# Patient Record
Sex: FEMALE | Race: WHITE | URBAN - METROPOLITAN AREA
[De-identification: names, ages, dates, MRNs, and addresses within clinical notes are randomized per-mention and may not be internally consistent; named-entity substitution may affect disease eponyms.]

---

## 2018-06-22 ENCOUNTER — INPATIENT (INPATIENT)
Facility: HOSPITAL | Age: 78
LOS: 1 days | Discharge: HOME | End: 2018-06-24
Attending: FAMILY MEDICINE | Admitting: FAMILY MEDICINE
Payer: MEDICARE

## 2018-06-22 VITALS
WEIGHT: 199.96 LBS | HEIGHT: 63 IN | DIASTOLIC BLOOD PRESSURE: 92 MMHG | RESPIRATION RATE: 16 BRPM | SYSTOLIC BLOOD PRESSURE: 185 MMHG | OXYGEN SATURATION: 97 % | TEMPERATURE: 98 F | HEART RATE: 100 BPM

## 2018-06-22 DIAGNOSIS — Z90.49 ACQUIRED ABSENCE OF OTHER SPECIFIED PARTS OF DIGESTIVE TRACT: Chronic | ICD-10-CM

## 2018-06-22 LAB
ALBUMIN SERPL ELPH-MCNC: 3.8 G/DL — SIGNIFICANT CHANGE UP (ref 3.3–5)
ALP SERPL-CCNC: 96 U/L — SIGNIFICANT CHANGE UP (ref 40–120)
ALT FLD-CCNC: 19 U/L — SIGNIFICANT CHANGE UP (ref 12–78)
ANION GAP SERPL CALC-SCNC: 8 MMOL/L — SIGNIFICANT CHANGE UP (ref 5–17)
APPEARANCE UR: CLEAR — SIGNIFICANT CHANGE UP
AST SERPL-CCNC: 14 U/L — LOW (ref 15–37)
BASOPHILS # BLD AUTO: 0.01 K/UL — SIGNIFICANT CHANGE UP (ref 0–0.2)
BASOPHILS NFR BLD AUTO: 0.1 % — SIGNIFICANT CHANGE UP (ref 0–2)
BILIRUB SERPL-MCNC: 0.6 MG/DL — SIGNIFICANT CHANGE UP (ref 0.2–1.2)
BILIRUB UR-MCNC: NEGATIVE — SIGNIFICANT CHANGE UP
BUN SERPL-MCNC: 21 MG/DL — SIGNIFICANT CHANGE UP (ref 7–23)
CALCIUM SERPL-MCNC: 9.3 MG/DL — SIGNIFICANT CHANGE UP (ref 8.5–10.1)
CHLORIDE SERPL-SCNC: 103 MMOL/L — SIGNIFICANT CHANGE UP (ref 96–108)
CO2 SERPL-SCNC: 30 MMOL/L — SIGNIFICANT CHANGE UP (ref 22–31)
COLOR SPEC: YELLOW — SIGNIFICANT CHANGE UP
CREAT SERPL-MCNC: 1.15 MG/DL — SIGNIFICANT CHANGE UP (ref 0.5–1.3)
DIFF PNL FLD: ABNORMAL
EOSINOPHIL # BLD AUTO: 0 K/UL — SIGNIFICANT CHANGE UP (ref 0–0.5)
EOSINOPHIL NFR BLD AUTO: 0 % — SIGNIFICANT CHANGE UP (ref 0–6)
GLUCOSE SERPL-MCNC: 205 MG/DL — HIGH (ref 70–99)
GLUCOSE UR QL: 50 MG/DL
HCT VFR BLD CALC: 38.1 % — SIGNIFICANT CHANGE UP (ref 34.5–45)
HGB BLD-MCNC: 13.2 G/DL — SIGNIFICANT CHANGE UP (ref 11.5–15.5)
IMM GRANULOCYTES NFR BLD AUTO: 1 % — SIGNIFICANT CHANGE UP (ref 0–1.5)
KETONES UR-MCNC: ABNORMAL
LEUKOCYTE ESTERASE UR-ACNC: ABNORMAL
LIDOCAIN IGE QN: 99 U/L — SIGNIFICANT CHANGE UP (ref 73–393)
LYMPHOCYTES # BLD AUTO: 0.89 K/UL — LOW (ref 1–3.3)
LYMPHOCYTES # BLD AUTO: 8.2 % — LOW (ref 13–44)
MCHC RBC-ENTMCNC: 29.9 PG — SIGNIFICANT CHANGE UP (ref 27–34)
MCHC RBC-ENTMCNC: 34.6 GM/DL — SIGNIFICANT CHANGE UP (ref 32–36)
MCV RBC AUTO: 86.4 FL — SIGNIFICANT CHANGE UP (ref 80–100)
MONOCYTES # BLD AUTO: 0.52 K/UL — SIGNIFICANT CHANGE UP (ref 0–0.9)
MONOCYTES NFR BLD AUTO: 4.8 % — SIGNIFICANT CHANGE UP (ref 2–14)
NEUTROPHILS # BLD AUTO: 9.3 K/UL — HIGH (ref 1.8–7.4)
NEUTROPHILS NFR BLD AUTO: 85.9 % — HIGH (ref 43–77)
NITRITE UR-MCNC: NEGATIVE — SIGNIFICANT CHANGE UP
PH UR: 5 — SIGNIFICANT CHANGE UP (ref 5–8)
PLATELET # BLD AUTO: 201 K/UL — SIGNIFICANT CHANGE UP (ref 150–400)
POTASSIUM SERPL-MCNC: 3.9 MMOL/L — SIGNIFICANT CHANGE UP (ref 3.5–5.3)
POTASSIUM SERPL-SCNC: 3.9 MMOL/L — SIGNIFICANT CHANGE UP (ref 3.5–5.3)
PROT SERPL-MCNC: 7.6 GM/DL — SIGNIFICANT CHANGE UP (ref 6–8.3)
PROT UR-MCNC: 30 MG/DL
RBC # BLD: 4.41 M/UL — SIGNIFICANT CHANGE UP (ref 3.8–5.2)
RBC # FLD: 12.2 % — SIGNIFICANT CHANGE UP (ref 10.3–14.5)
SODIUM SERPL-SCNC: 141 MMOL/L — SIGNIFICANT CHANGE UP (ref 135–145)
SP GR SPEC: 1.01 — SIGNIFICANT CHANGE UP (ref 1.01–1.02)
UROBILINOGEN FLD QL: NEGATIVE MG/DL — SIGNIFICANT CHANGE UP
WBC # BLD: 10.83 K/UL — HIGH (ref 3.8–10.5)
WBC # FLD AUTO: 10.83 K/UL — HIGH (ref 3.8–10.5)

## 2018-06-22 PROCEDURE — 93010 ELECTROCARDIOGRAM REPORT: CPT

## 2018-06-22 PROCEDURE — 74177 CT ABD & PELVIS W/CONTRAST: CPT | Mod: 26

## 2018-06-22 PROCEDURE — 99285 EMERGENCY DEPT VISIT HI MDM: CPT

## 2018-06-22 RX ORDER — TAMSULOSIN HYDROCHLORIDE 0.4 MG/1
0.4 CAPSULE ORAL ONCE
Qty: 0 | Refills: 0 | Status: COMPLETED | OUTPATIENT
Start: 2018-06-22 | End: 2018-06-22

## 2018-06-22 RX ORDER — ONDANSETRON 8 MG/1
4 TABLET, FILM COATED ORAL ONCE
Qty: 0 | Refills: 0 | Status: COMPLETED | OUTPATIENT
Start: 2018-06-22 | End: 2018-06-22

## 2018-06-22 RX ORDER — SODIUM CHLORIDE 9 MG/ML
1000 INJECTION INTRAMUSCULAR; INTRAVENOUS; SUBCUTANEOUS ONCE
Qty: 0 | Refills: 0 | Status: COMPLETED | OUTPATIENT
Start: 2018-06-22 | End: 2018-06-22

## 2018-06-22 RX ORDER — SODIUM CHLORIDE 9 MG/ML
500 INJECTION INTRAMUSCULAR; INTRAVENOUS; SUBCUTANEOUS ONCE
Qty: 0 | Refills: 0 | Status: COMPLETED | OUTPATIENT
Start: 2018-06-22 | End: 2018-06-22

## 2018-06-22 RX ORDER — MORPHINE SULFATE 50 MG/1
4 CAPSULE, EXTENDED RELEASE ORAL ONCE
Qty: 0 | Refills: 0 | Status: DISCONTINUED | OUTPATIENT
Start: 2018-06-22 | End: 2018-06-22

## 2018-06-22 RX ADMIN — TAMSULOSIN HYDROCHLORIDE 0.4 MILLIGRAM(S): 0.4 CAPSULE ORAL at 22:08

## 2018-06-22 RX ADMIN — MORPHINE SULFATE 4 MILLIGRAM(S): 50 CAPSULE, EXTENDED RELEASE ORAL at 17:53

## 2018-06-22 RX ADMIN — MORPHINE SULFATE 4 MILLIGRAM(S): 50 CAPSULE, EXTENDED RELEASE ORAL at 22:17

## 2018-06-22 RX ADMIN — ONDANSETRON 4 MILLIGRAM(S): 8 TABLET, FILM COATED ORAL at 17:54

## 2018-06-22 RX ADMIN — SODIUM CHLORIDE 500 MILLILITER(S): 9 INJECTION INTRAMUSCULAR; INTRAVENOUS; SUBCUTANEOUS at 19:23

## 2018-06-22 RX ADMIN — SODIUM CHLORIDE 1000 MILLILITER(S): 9 INJECTION INTRAMUSCULAR; INTRAVENOUS; SUBCUTANEOUS at 17:56

## 2018-06-22 NOTE — ED ADULT NURSE NOTE - CHPI ED SYMPTOMS NEG
no fever/no abdominal distension/no hematuria/no diarrhea/no burning urination/no blood in stool/no dysuria/no chills

## 2018-06-22 NOTE — ED ADULT NURSE REASSESSMENT NOTE - NS ED NURSE REASSESS COMMENT FT1
pt comfortable no complaints at this time pt comfortable no complaints at this time, family at bedside

## 2018-06-22 NOTE — ED PROVIDER NOTE - OBJECTIVE STATEMENT
79 y/o female with a PMHx of HTN, DM, hypothyroidism, s/p cholecystectomy (2011) presents to the ED c/o abd pain. +x1 episode vomiting. +constipation. Pt notes she started feeling the pain around 3 AM and then had an episode of vomiting at 5 AM. Notes the pain is less in ED. Pt notes she has diverticula. Denies SOB, CP, diarrhea, dysuria, hematuria. Denies Hx of kidney stones. Last BM x1 day ago.  Sugar reading 255 at 1:30 PM today.

## 2018-06-22 NOTE — ED ADULT NURSE NOTE - OBJECTIVE STATEMENT
Pt states abd pain started this morning but has not subsided so she came here . Pt states pain is on lower left side denies radiation. Pt stated she vomited once this AM

## 2018-06-22 NOTE — ED PROVIDER NOTE - MEDICAL DECISION MAKING DETAILS
77 y/o female presents to the ED c/o LLQ pain and left flank pain. diverticulitis vs. colitis vs. kidney stones.

## 2018-06-23 DIAGNOSIS — N23 UNSPECIFIED RENAL COLIC: ICD-10-CM

## 2018-06-23 DIAGNOSIS — E11.9 TYPE 2 DIABETES MELLITUS WITHOUT COMPLICATIONS: ICD-10-CM

## 2018-06-23 DIAGNOSIS — Z29.9 ENCOUNTER FOR PROPHYLACTIC MEASURES, UNSPECIFIED: ICD-10-CM

## 2018-06-23 DIAGNOSIS — N13.30 UNSPECIFIED HYDRONEPHROSIS: ICD-10-CM

## 2018-06-23 DIAGNOSIS — I10 ESSENTIAL (PRIMARY) HYPERTENSION: ICD-10-CM

## 2018-06-23 DIAGNOSIS — N20.0 CALCULUS OF KIDNEY: ICD-10-CM

## 2018-06-23 DIAGNOSIS — E03.9 HYPOTHYROIDISM, UNSPECIFIED: ICD-10-CM

## 2018-06-23 LAB
ANION GAP SERPL CALC-SCNC: 7 MMOL/L — SIGNIFICANT CHANGE UP (ref 5–17)
BASOPHILS # BLD AUTO: 0.01 K/UL — SIGNIFICANT CHANGE UP (ref 0–0.2)
BASOPHILS NFR BLD AUTO: 0.1 % — SIGNIFICANT CHANGE UP (ref 0–2)
BUN SERPL-MCNC: 19 MG/DL — SIGNIFICANT CHANGE UP (ref 7–23)
CALCIUM SERPL-MCNC: 8.2 MG/DL — LOW (ref 8.5–10.1)
CHLORIDE SERPL-SCNC: 105 MMOL/L — SIGNIFICANT CHANGE UP (ref 96–108)
CO2 SERPL-SCNC: 27 MMOL/L — SIGNIFICANT CHANGE UP (ref 22–31)
CREAT SERPL-MCNC: 1.04 MG/DL — SIGNIFICANT CHANGE UP (ref 0.5–1.3)
EOSINOPHIL # BLD AUTO: 0.02 K/UL — SIGNIFICANT CHANGE UP (ref 0–0.5)
EOSINOPHIL NFR BLD AUTO: 0.2 % — SIGNIFICANT CHANGE UP (ref 0–6)
GLUCOSE BLDC GLUCOMTR-MCNC: 147 MG/DL — HIGH (ref 70–99)
GLUCOSE BLDC GLUCOMTR-MCNC: 149 MG/DL — HIGH (ref 70–99)
GLUCOSE BLDC GLUCOMTR-MCNC: 168 MG/DL — HIGH (ref 70–99)
GLUCOSE BLDC GLUCOMTR-MCNC: 189 MG/DL — HIGH (ref 70–99)
GLUCOSE SERPL-MCNC: 149 MG/DL — HIGH (ref 70–99)
HBA1C BLD-MCNC: 6.7 % — HIGH (ref 4–5.6)
HCT VFR BLD CALC: 32.8 % — LOW (ref 34.5–45)
HGB BLD-MCNC: 10.8 G/DL — LOW (ref 11.5–15.5)
IMM GRANULOCYTES NFR BLD AUTO: 0.4 % — SIGNIFICANT CHANGE UP (ref 0–1.5)
LYMPHOCYTES # BLD AUTO: 1.19 K/UL — SIGNIFICANT CHANGE UP (ref 1–3.3)
LYMPHOCYTES # BLD AUTO: 13 % — SIGNIFICANT CHANGE UP (ref 13–44)
MCHC RBC-ENTMCNC: 29.3 PG — SIGNIFICANT CHANGE UP (ref 27–34)
MCHC RBC-ENTMCNC: 32.9 GM/DL — SIGNIFICANT CHANGE UP (ref 32–36)
MCV RBC AUTO: 88.9 FL — SIGNIFICANT CHANGE UP (ref 80–100)
MONOCYTES # BLD AUTO: 0.89 K/UL — SIGNIFICANT CHANGE UP (ref 0–0.9)
MONOCYTES NFR BLD AUTO: 9.7 % — SIGNIFICANT CHANGE UP (ref 2–14)
NEUTROPHILS # BLD AUTO: 7 K/UL — SIGNIFICANT CHANGE UP (ref 1.8–7.4)
NEUTROPHILS NFR BLD AUTO: 76.6 % — SIGNIFICANT CHANGE UP (ref 43–77)
NRBC # BLD: 0 /100 WBCS — SIGNIFICANT CHANGE UP (ref 0–0)
PLATELET # BLD AUTO: 167 K/UL — SIGNIFICANT CHANGE UP (ref 150–400)
POTASSIUM SERPL-MCNC: 3.6 MMOL/L — SIGNIFICANT CHANGE UP (ref 3.5–5.3)
POTASSIUM SERPL-SCNC: 3.6 MMOL/L — SIGNIFICANT CHANGE UP (ref 3.5–5.3)
RBC # BLD: 3.69 M/UL — LOW (ref 3.8–5.2)
RBC # FLD: 12.4 % — SIGNIFICANT CHANGE UP (ref 10.3–14.5)
SODIUM SERPL-SCNC: 139 MMOL/L — SIGNIFICANT CHANGE UP (ref 135–145)
WBC # BLD: 9.15 K/UL — SIGNIFICANT CHANGE UP (ref 3.8–10.5)
WBC # FLD AUTO: 9.15 K/UL — SIGNIFICANT CHANGE UP (ref 3.8–10.5)

## 2018-06-23 PROCEDURE — 52332 CYSTOSCOPY AND TREATMENT: CPT | Mod: LT

## 2018-06-23 PROCEDURE — 99223 1ST HOSP IP/OBS HIGH 75: CPT | Mod: 25

## 2018-06-23 RX ORDER — TAMSULOSIN HYDROCHLORIDE 0.4 MG/1
0.4 CAPSULE ORAL AT BEDTIME
Qty: 0 | Refills: 0 | Status: DISCONTINUED | OUTPATIENT
Start: 2018-06-23 | End: 2018-06-24

## 2018-06-23 RX ORDER — HEPARIN SODIUM 5000 [USP'U]/ML
5000 INJECTION INTRAVENOUS; SUBCUTANEOUS EVERY 12 HOURS
Qty: 0 | Refills: 0 | Status: DISCONTINUED | OUTPATIENT
Start: 2018-06-23 | End: 2018-06-24

## 2018-06-23 RX ORDER — OXYBUTYNIN CHLORIDE 5 MG
5 TABLET ORAL THREE TIMES A DAY
Qty: 0 | Refills: 0 | Status: DISCONTINUED | OUTPATIENT
Start: 2018-06-23 | End: 2018-06-24

## 2018-06-23 RX ORDER — PHENAZOPYRIDINE HCL 100 MG
100 TABLET ORAL THREE TIMES A DAY
Qty: 0 | Refills: 0 | Status: DISCONTINUED | OUTPATIENT
Start: 2018-06-23 | End: 2018-06-24

## 2018-06-23 RX ORDER — FENTANYL CITRATE 50 UG/ML
50 INJECTION INTRAVENOUS
Qty: 0 | Refills: 0 | Status: DISCONTINUED | OUTPATIENT
Start: 2018-06-23 | End: 2018-06-23

## 2018-06-23 RX ORDER — OXYCODONE HYDROCHLORIDE 5 MG/1
10 TABLET ORAL EVERY 6 HOURS
Qty: 0 | Refills: 0 | Status: DISCONTINUED | OUTPATIENT
Start: 2018-06-23 | End: 2018-06-23

## 2018-06-23 RX ORDER — SODIUM CHLORIDE 9 MG/ML
1000 INJECTION INTRAMUSCULAR; INTRAVENOUS; SUBCUTANEOUS
Qty: 0 | Refills: 0 | Status: COMPLETED | OUTPATIENT
Start: 2018-06-23 | End: 2018-06-23

## 2018-06-23 RX ORDER — ASPIRIN/CALCIUM CARB/MAGNESIUM 324 MG
81 TABLET ORAL DAILY
Qty: 0 | Refills: 0 | Status: DISCONTINUED | OUTPATIENT
Start: 2018-06-23 | End: 2018-06-24

## 2018-06-23 RX ORDER — SODIUM CHLORIDE 9 MG/ML
1000 INJECTION INTRAMUSCULAR; INTRAVENOUS; SUBCUTANEOUS
Qty: 0 | Refills: 0 | Status: DISCONTINUED | OUTPATIENT
Start: 2018-06-23 | End: 2018-06-24

## 2018-06-23 RX ORDER — DEXTROSE 50 % IN WATER 50 %
25 SYRINGE (ML) INTRAVENOUS ONCE
Qty: 0 | Refills: 0 | Status: DISCONTINUED | OUTPATIENT
Start: 2018-06-23 | End: 2018-06-24

## 2018-06-23 RX ORDER — INSULIN LISPRO 100/ML
VIAL (ML) SUBCUTANEOUS
Qty: 0 | Refills: 0 | Status: DISCONTINUED | OUTPATIENT
Start: 2018-06-23 | End: 2018-06-24

## 2018-06-23 RX ORDER — MORPHINE SULFATE 50 MG/1
2 CAPSULE, EXTENDED RELEASE ORAL ONCE
Qty: 0 | Refills: 0 | Status: DISCONTINUED | OUTPATIENT
Start: 2018-06-23 | End: 2018-06-23

## 2018-06-23 RX ORDER — LEVOTHYROXINE SODIUM 125 MCG
88 TABLET ORAL DAILY
Qty: 0 | Refills: 0 | Status: DISCONTINUED | OUTPATIENT
Start: 2018-06-23 | End: 2018-06-24

## 2018-06-23 RX ORDER — SODIUM CHLORIDE 9 MG/ML
1000 INJECTION, SOLUTION INTRAVENOUS
Qty: 0 | Refills: 0 | Status: DISCONTINUED | OUTPATIENT
Start: 2018-06-23 | End: 2018-06-24

## 2018-06-23 RX ORDER — MORPHINE SULFATE 50 MG/1
4 CAPSULE, EXTENDED RELEASE ORAL EVERY 4 HOURS
Qty: 0 | Refills: 0 | Status: DISCONTINUED | OUTPATIENT
Start: 2018-06-23 | End: 2018-06-24

## 2018-06-23 RX ORDER — ACETAMINOPHEN 500 MG
650 TABLET ORAL EVERY 6 HOURS
Qty: 0 | Refills: 0 | Status: DISCONTINUED | OUTPATIENT
Start: 2018-06-23 | End: 2018-06-24

## 2018-06-23 RX ORDER — ATORVASTATIN CALCIUM 80 MG/1
20 TABLET, FILM COATED ORAL AT BEDTIME
Qty: 0 | Refills: 0 | Status: DISCONTINUED | OUTPATIENT
Start: 2018-06-23 | End: 2018-06-24

## 2018-06-23 RX ORDER — DEXTROSE 50 % IN WATER 50 %
12.5 SYRINGE (ML) INTRAVENOUS ONCE
Qty: 0 | Refills: 0 | Status: DISCONTINUED | OUTPATIENT
Start: 2018-06-23 | End: 2018-06-24

## 2018-06-23 RX ORDER — DEXTROSE 50 % IN WATER 50 %
15 SYRINGE (ML) INTRAVENOUS ONCE
Qty: 0 | Refills: 0 | Status: DISCONTINUED | OUTPATIENT
Start: 2018-06-23 | End: 2018-06-24

## 2018-06-23 RX ORDER — SODIUM CHLORIDE 9 MG/ML
1000 INJECTION INTRAMUSCULAR; INTRAVENOUS; SUBCUTANEOUS
Qty: 0 | Refills: 0 | Status: DISCONTINUED | OUTPATIENT
Start: 2018-06-23 | End: 2018-06-23

## 2018-06-23 RX ORDER — SENNA PLUS 8.6 MG/1
2 TABLET ORAL AT BEDTIME
Qty: 0 | Refills: 0 | Status: DISCONTINUED | OUTPATIENT
Start: 2018-06-23 | End: 2018-06-24

## 2018-06-23 RX ORDER — MORPHINE SULFATE 50 MG/1
2 CAPSULE, EXTENDED RELEASE ORAL EVERY 4 HOURS
Qty: 0 | Refills: 0 | Status: DISCONTINUED | OUTPATIENT
Start: 2018-06-23 | End: 2018-06-24

## 2018-06-23 RX ORDER — ONDANSETRON 8 MG/1
4 TABLET, FILM COATED ORAL ONCE
Qty: 0 | Refills: 0 | Status: DISCONTINUED | OUTPATIENT
Start: 2018-06-23 | End: 2018-06-23

## 2018-06-23 RX ORDER — GLUCAGON INJECTION, SOLUTION 0.5 MG/.1ML
1 INJECTION, SOLUTION SUBCUTANEOUS ONCE
Qty: 0 | Refills: 0 | Status: DISCONTINUED | OUTPATIENT
Start: 2018-06-23 | End: 2018-06-24

## 2018-06-23 RX ADMIN — ATORVASTATIN CALCIUM 20 MILLIGRAM(S): 80 TABLET, FILM COATED ORAL at 22:37

## 2018-06-23 RX ADMIN — Medication 88 MICROGRAM(S): at 05:21

## 2018-06-23 RX ADMIN — TAMSULOSIN HYDROCHLORIDE 0.4 MILLIGRAM(S): 0.4 CAPSULE ORAL at 22:37

## 2018-06-23 RX ADMIN — SODIUM CHLORIDE 75 MILLILITER(S): 9 INJECTION INTRAMUSCULAR; INTRAVENOUS; SUBCUTANEOUS at 22:36

## 2018-06-23 RX ADMIN — HEPARIN SODIUM 5000 UNIT(S): 5000 INJECTION INTRAVENOUS; SUBCUTANEOUS at 17:05

## 2018-06-23 RX ADMIN — HEPARIN SODIUM 5000 UNIT(S): 5000 INJECTION INTRAVENOUS; SUBCUTANEOUS at 05:21

## 2018-06-23 RX ADMIN — Medication 2: at 07:57

## 2018-06-23 RX ADMIN — Medication 100 MILLIGRAM(S): at 23:58

## 2018-06-23 RX ADMIN — SODIUM CHLORIDE 100 MILLILITER(S): 9 INJECTION INTRAMUSCULAR; INTRAVENOUS; SUBCUTANEOUS at 23:59

## 2018-06-23 RX ADMIN — OXYCODONE HYDROCHLORIDE 10 MILLIGRAM(S): 5 TABLET ORAL at 22:44

## 2018-06-23 RX ADMIN — SODIUM CHLORIDE 100 MILLILITER(S): 9 INJECTION INTRAMUSCULAR; INTRAVENOUS; SUBCUTANEOUS at 15:07

## 2018-06-23 RX ADMIN — SODIUM CHLORIDE 100 MILLILITER(S): 9 INJECTION INTRAMUSCULAR; INTRAVENOUS; SUBCUTANEOUS at 05:22

## 2018-06-23 RX ADMIN — MORPHINE SULFATE 2 MILLIGRAM(S): 50 CAPSULE, EXTENDED RELEASE ORAL at 02:43

## 2018-06-23 RX ADMIN — MORPHINE SULFATE 4 MILLIGRAM(S): 50 CAPSULE, EXTENDED RELEASE ORAL at 14:59

## 2018-06-23 RX ADMIN — MORPHINE SULFATE 2 MILLIGRAM(S): 50 CAPSULE, EXTENDED RELEASE ORAL at 08:45

## 2018-06-23 RX ADMIN — Medication 81 MILLIGRAM(S): at 11:10

## 2018-06-23 NOTE — CONSULT NOTE ADULT - SUBJECTIVE AND OBJECTIVE BOX
CHIEF COMPLAINT:  Left 9 mm obstructing renal pelvic stone    HISTORY OF PRESENT ILLNESS:  79 yo female admitted to medicine service with left sided groin pain with associated nausea and vomiting.   On CT-  Left 9 mm obstructing renal pelvic stone. Still requiring IV pain medication every few hours.   No prior h/o kidney stone. Denies dysuria, hematuria, fever, chills or rigors.   Saw Dr Pack once for Renal cyst.       PAST MEDICAL & SURGICAL HISTORY:  Chronic back pain  Hypothyroidism  DM (diabetes mellitus)  Hypertension  S/P cholecystectomy      REVIEW OF SYSTEMS:    CONSTITUTIONAL: No weakness, fevers or chills  EYES/ENT: No visual changes;  No vertigo or throat pain   NECK: No pain or stiffness  RESPIRATORY: No cough, wheezing, hemoptysis, No shortness of breath  CARDIOVASCULAR: No chest pain or palpitations  GASTROINTESTINAL: See HPI  GENITOURINARY: See HPI  NEUROLOGICAL: No numbness or weakness  SKIN: No rashes or lesions   All other review of systems is negative unless indicated above.    MEDICATIONS  (STANDING):  aspirin enteric coated 81 milliGRAM(s) Oral daily  atorvastatin 20 milliGRAM(s) Oral at bedtime  dextrose 5%. 1000 milliLiter(s) (50 mL/Hr) IV Continuous <Continuous>  dextrose 50% Injectable 12.5 Gram(s) IV Push once  dextrose 50% Injectable 25 Gram(s) IV Push once  dextrose 50% Injectable 25 Gram(s) IV Push once  heparin  Injectable 5000 Unit(s) SubCutaneous every 12 hours  insulin lispro (HumaLOG) corrective regimen sliding scale   SubCutaneous three times a day before meals  levoFLOXacin IVPB 750 milliGRAM(s) IV Intermittent every 24 hours  levothyroxine 88 MICROGram(s) Oral daily  sodium chloride 0.9%. 1000 milliLiter(s) (100 mL/Hr) IV Continuous <Continuous>  sodium chloride 0.9%. 1000 milliLiter(s) (100 mL/Hr) IV Continuous <Continuous>  tamsulosin 0.4 milliGRAM(s) Oral at bedtime    MEDICATIONS  (PRN):  acetaminophen   Tablet. 650 milliGRAM(s) Oral every 6 hours PRN Mild Pain (1 - 3)  dextrose 40% Gel 15 Gram(s) Oral once PRN Blood Glucose LESS THAN 70 milliGRAM(s)/deciliter  glucagon  Injectable 1 milliGRAM(s) IntraMuscular once PRN Glucose LESS THAN 70 milligrams/deciliter  morphine  - Injectable 2 milliGRAM(s) IV Push every 4 hours PRN Moderate Pain (4 - 6)  morphine  - Injectable 4 milliGRAM(s) IV Push every 4 hours PRN Severe Pain (7 - 10)  senna 2 Tablet(s) Oral at bedtime PRN Constipation      Allergies    Ammoniated Mercury (Unknown)  Keflex (Unknown)  penicillin G potassium (Unknown)  Victoza (Unknown)    Intolerances        SOCIAL HISTORY:    FAMILY HISTORY:  Family history of hypertension (Father, Mother)  Family history of diabetes mellitus (Sibling, Grandparent): Brother and grandmother      Vital Signs Last 24 Hrs  T(C): 36.7 (2018 15:44), Max: 37 (2018 03:56)  T(F): 98 (2018 15:44), Max: 98.6 (2018 03:56)  HR: 74 (2018 15:44) (74 - 87)  BP: 134/49 (2018 15:44) (117/47 - 136/61)  BP(mean): --  RR: 16 (2018 15:44) (16 - 18)  SpO2: 98% (2018 15:44) (93% - 98%)    PHYSICAL EXAM:    Constitutional: No acute distress  HEENT: EOMI, Normal Hearing  Neck: Supple  Back: No costovertebral angle tenderness  Respiratory: Normal respiratory effort    Cardiovascular: Normal peripheral circulation   Abd: Soft, non distended, non tender  Extremities: No peripheral edema  Neurological: No focal deficits  Psychiatric: Normal mood, normal affect  Musculoskeletal: Moving all 4 extremities  Skin: No rashes    LABS:                        10.8   9.15  )-----------( 167      ( 2018 07:31 )             32.8     06-23    139  |  105  |  19  ----------------------------<  149<H>  3.6   |  27  |  1.04    Ca    8.2<L>      2018 07:31    TPro  7.6  /  Alb  3.8  /  TBili  0.6  /  DBili  x   /  AST  14<L>  /  ALT  19  /  AlkPhos  96  06-22      Urinalysis Basic - ( 2018 21:54 )    Color: Yellow / Appearance: Clear / S.015 / pH: x  Gluc: x / Ketone: Trace  / Bili: Negative / Urobili: Negative mg/dL   Blood: x / Protein: 30 mg/dL / Nitrite: Negative   Leuk Esterase: Trace / RBC: 6-10 /HPF / WBC 3-5   Sq Epi: x / Non Sq Epi: Moderate / Bacteria: Few    < from: CT Abdomen and Pelvis w/ Oral Cont and w/ IV Cont (18 @ 21:14) >  CT ABDOMEN AND PELVIS OC IC                            PROCEDURE DATE:  2018          INTERPRETATION:  CLINICAL INFORMATION: Left lower quadrant pain    COMPARISON: None    PROCEDURE:   CT of the Abdomen and Pelvis was performed with intravenous contrast.   Intravenous contrast: 90 ml Omnipaque 350. 10 ml discarded.  Oral contrast: positive contrast was administered.  Sagittal and coronal reformats were performed.    FINDINGS:    LOWER CHEST: Within normal limits.    LIVER: Within normal limits.  BILE DUCTS: Normal caliber.  GALLBLADDER: Cholecystectomy clips.  SPLEEN: Within normal limits.  PANCREAS: Within normal limits.  ADRENALS: Within normal limits.  KIDNEYS/URETERS: 9 mm calculus in the left renal pelvis with mild   hydronephrosis. Parapelvic left renal cysts. Atrophic left kidney. Right   renal cyst measures 4.3 cm..    BLADDER: Within normal limits.  REPRODUCTIVE ORGANS: The uterus and adnexa are within normal limits.     BOWEL: No bowel obstruction. Appendix normal. Diverticulosis coli.  PERITONEUM: No ascites.  VESSELS: Atherosclerotic calcifications of the aorta.  RETROPERITONEUM: No lymphadenopathy.    ABDOMINAL WALL: Within normal limits.  BONES: Degenerative changes of the spine are present with grade 1   anterolisthesis of L4 on L5.    IMPRESSION: 9 mm calculus in the left renal pelvis with mild   hydronephrosis.    < end of copied text >

## 2018-06-23 NOTE — BRIEF OPERATIVE NOTE - PRE-OP DX
Kidney stone on left side  06/23/2018    Active  Jorge Alvarez  Left flank pain  06/23/2018    Active  Jorge Alvarez

## 2018-06-23 NOTE — BRIEF OPERATIVE NOTE - PROCEDURE
<<-----Click on this checkbox to enter Procedure Cystoscopy with insertion of stent  06/23/2018  Left  Active  THERON

## 2018-06-23 NOTE — H&P ADULT - PROBLEM SELECTOR PLAN 1
Admit to med surg  Vitals q8h  Continue levofloxacin  Tamsulosin 0.4mg daily   Morphine 2/4 mg q4h for pain  IVF NS at 100 mL/hr for 12 hours Admit to med surg  Vitals q8h  Continue levofloxacin  Tamsulosin 0.4mg daily   Morphine 2/4 mg q4h for pain  IVF NS at 100 mL/hr for 12 hours  Urology consult Due to obstructive uropathy and probably UTI  Admit to med surg  Vitals q8h  Continue levofloxacin 750mg q24 (penicillin/cephalosporin allergy)  Tamsulosin 0.4mg daily   Morphine 2/4 mg q4h for pain  IVF NS at 100 mL/hr for 12 hours  CT appreciated: mild hydro and 9mm calculus  Urology consult: Dr. Alvarez

## 2018-06-23 NOTE — CONSULT NOTE ADULT - ASSESSMENT
Discussed treatment options and recommended Cystoscopy, retrograde pyelogram and left ureteral stent placement.   Pt agreeable. Booked for OR.   Risks and benefits were discussed. Informed consent obtained.     Discussed will need definitive management of stones at a later with either ESWL(if stone are radio opaque) or Ureteroscopy, laser lithotripsy, stone extraction and stent exchange.

## 2018-06-23 NOTE — H&P ADULT - HISTORY OF PRESENT ILLNESS
This is an 79 y/o F with PMHx HTN, DM2, hypothyroidism, chronic back pain s/p neurotomy presents with 1 day LLQ/groin pain. Reports that it started at approximately 3 AM on 6/22 and is described as constant, severe, radiating to the back, associated with nausea and one episode of vomiting. Pain well controlled with morphine. Denies any dysuria, fever, chills, CP, SOB or additional symptoms. No previous history of kidney stones. Reports she sees her urologist, Dr. Tracie Zarco, for monitoring of a renal cyst. Patient reports she was unable to take her medications on day of and day prior to admission due to nausea.     Reports rash with penicillins and keflex. Received levofloxacin, tamsulosin and 1500 mL NS bolus in ED.

## 2018-06-23 NOTE — H&P ADULT - NSHPREVIEWOFSYSTEMS_GEN_ALL_CORE
REVIEW OF SYSTEMS:    CONSTITUTIONAL: No weakness, fevers or chills  EYES/ENT: No visual changes;  No vertigo or throat pain   NECK: No pain or stiffness  RESPIRATORY: No cough, wheezing, hemoptysis; No shortness of breath  CARDIOVASCULAR: No chest pain or palpitations  GASTROINTESTINAL: + abdominal, no epigastric pain. + nausea, vomiting. No hematemesis; No diarrhea or constipation. No melena or hematochezia.  GENITOURINARY: No dysuria, frequency  NEUROLOGICAL: No numbness or weakness  SKIN: No itching, burning, rashes, or lesions   All other review of systems is negative unless indicated above.

## 2018-06-23 NOTE — BRIEF OPERATIVE NOTE - COMMENTS
Will need definitive management of stones at a later with either ESWL(if stone are radio opaque) or Ureteroscopy, laser lithotripsy, stone extraction and stent exchange. Will contact Pt.   Will get KUB in the AM.   Can be discharged tomorrow if stable.   Pyridium 100 mg TID x 3 days.   Oxybutynin 5 mg TID PRN Bladder spasms.

## 2018-06-23 NOTE — PROGRESS NOTE ADULT - ATTENDING COMMENTS
Patient seen and examined with Family Medicine Residents Rafael Shah and on the Family Medicine Teaching Service.  Agree with history, physical, labs and plan which were reviewed in detail.

## 2018-06-23 NOTE — H&P ADULT - FAMILY HISTORY
Sibling  Still living? Unknown  Family history of diabetes mellitus, Age at diagnosis: Age Unknown     Grandparent  Still living? Unknown  Family history of diabetes mellitus, Age at diagnosis: Age Unknown     Father  Still living? Unknown  Family history of hypertension, Age at diagnosis: Age Unknown     Mother  Still living? Unknown  Family history of hypertension, Age at diagnosis: Age Unknown

## 2018-06-23 NOTE — H&P ADULT - NSHPPHYSICALEXAM_GEN_ALL_CORE
Vital Signs Last 24 Hrs  T(C): 36.7 (22 Jun 2018 16:23), Max: 36.7 (22 Jun 2018 16:23)  T(F): 98.1 (22 Jun 2018 16:23), Max: 98.1 (22 Jun 2018 16:23)  HR: 85 (22 Jun 2018 22:07) (85 - 100)  BP: 136/61 (22 Jun 2018 22:07) (136/61 - 185/92)  BP(mean): --  RR: 18 (22 Jun 2018 22:07) (16 - 18)  SpO2: 95% (22 Jun 2018 22:07) (95% - 97%)    PHYSICAL EXAM:    GENERAL: NAD, person of size  HEAD:  NC/AT  EYES: EOMI, no scleral icterus  HEENT: Moist mucous membranes  NECK: Supple, No JVD  CNS:  Alert & Oriented X3, non-focal  LUNG: Clear to auscultation bilaterally; No rales, rhonchi, wheezing, or rubs  HEART: RRR; No murmurs, rubs, or gallops  ABDOMEN: +BS, LLQ/left groin mild tenderness to palpation. No CVA tenderness, patient had just received morphine.  GENITOURINARY- Voiding, Bladder not distended  EXTREMITIES:  2+ Peripheral Pulses, No clubbing, cyanosis, or edema  MUSCULOSKELTAL- Joints normal ROM, no Muscle or joint tenderness Vital Signs Last 24 Hrs  T(C): 36.7 (22 Jun 2018 16:23), Max: 36.7 (22 Jun 2018 16:23)  T(F): 98.1 (22 Jun 2018 16:23), Max: 98.1 (22 Jun 2018 16:23)  HR: 85 (22 Jun 2018 22:07) (85 - 100)  BP: 136/61 (22 Jun 2018 22:07) (136/61 - 185/92)  RR: 18 (22 Jun 2018 22:07) (16 - 18)  SpO2: 95% (22 Jun 2018 22:07) (95% - 97%)    PHYSICAL EXAM:    GENERAL: NAD, person of size  HEAD:  NC/AT  EYES: EOMI, no scleral icterus  HEENT: Moist mucous membranes  NECK: Supple, No JVD  CNS:  Alert & Oriented X3, non-focal  LUNG: Clear to auscultation bilaterally; No rales, rhonchi, wheezing, or rubs  HEART: RRR; No murmurs, rubs, or gallops  ABDOMEN: +BS, LLQ/left groin mild tenderness to palpation. No CVA tenderness, patient had just received morphine.  GENITOURINARY- Voiding, Bladder not distended  EXTREMITIES:  2+ Peripheral Pulses, No clubbing, cyanosis, or edema  MUSCULOSKELETAL- Joints normal ROM, no Muscle or joint tenderness

## 2018-06-23 NOTE — H&P ADULT - PROBLEM SELECTOR PLAN 4
Hold losartan  Continue metoprolol tartrate at half dose, 50mg BID Hold losartan and metoprolol for now

## 2018-06-23 NOTE — H&P ADULT - ATTENDING COMMENTS
77 y/o F PMHx as noted above presents to the ED for further evaluation of a 1 day hx of LLQ abdominal and left groin pain found to have an obstructive uropathy.    1)Acute Obstructive Uropathy w/ Left Hydronephrosis  ~admit to Medicine  ~CT ABD/Pel revealed a 9mm obstructing stone w/in the left renal pelvis w/ associated hydronephrosis  ~cont. IV hydration  ~NPO for now  ~f/u w/ Urology in the am  ~cont. Tamsulosin 0.4mg po daily  ~cont. IV abx  ~strict I/Os  ~cont. pain management as above     2)Hypothyroidism  ~cont. Levothyroxine as noted above     3)Diabetes Mellitus type 2   ~FS qAC/HS  ~cont. ISS per protocol     4)HTN  ~will hold losartan and metoprolol for now. Reassess in the am    5)Vte ppx  ~cont. Heparin sq

## 2018-06-24 ENCOUNTER — TRANSCRIPTION ENCOUNTER (OUTPATIENT)
Age: 78
End: 2018-06-24

## 2018-06-24 VITALS — SYSTOLIC BLOOD PRESSURE: 163 MMHG | DIASTOLIC BLOOD PRESSURE: 70 MMHG

## 2018-06-24 LAB
ANION GAP SERPL CALC-SCNC: 6 MMOL/L — SIGNIFICANT CHANGE UP (ref 5–17)
BUN SERPL-MCNC: 17 MG/DL — SIGNIFICANT CHANGE UP (ref 7–23)
CALCIUM SERPL-MCNC: 8.1 MG/DL — LOW (ref 8.5–10.1)
CHLORIDE SERPL-SCNC: 103 MMOL/L — SIGNIFICANT CHANGE UP (ref 96–108)
CO2 SERPL-SCNC: 27 MMOL/L — SIGNIFICANT CHANGE UP (ref 22–31)
CREAT SERPL-MCNC: 1.01 MG/DL — SIGNIFICANT CHANGE UP (ref 0.5–1.3)
GLUCOSE BLDC GLUCOMTR-MCNC: 216 MG/DL — HIGH (ref 70–99)
GLUCOSE BLDC GLUCOMTR-MCNC: 227 MG/DL — HIGH (ref 70–99)
GLUCOSE SERPL-MCNC: 202 MG/DL — HIGH (ref 70–99)
HCT VFR BLD CALC: 34.1 % — LOW (ref 34.5–45)
HGB BLD-MCNC: 11.5 G/DL — SIGNIFICANT CHANGE UP (ref 11.5–15.5)
MCHC RBC-ENTMCNC: 30.3 PG — SIGNIFICANT CHANGE UP (ref 27–34)
MCHC RBC-ENTMCNC: 33.7 GM/DL — SIGNIFICANT CHANGE UP (ref 32–36)
MCV RBC AUTO: 89.7 FL — SIGNIFICANT CHANGE UP (ref 80–100)
NRBC # BLD: 0 /100 WBCS — SIGNIFICANT CHANGE UP (ref 0–0)
PLATELET # BLD AUTO: 146 K/UL — LOW (ref 150–400)
POTASSIUM SERPL-MCNC: 4.2 MMOL/L — SIGNIFICANT CHANGE UP (ref 3.5–5.3)
POTASSIUM SERPL-SCNC: 4.2 MMOL/L — SIGNIFICANT CHANGE UP (ref 3.5–5.3)
RBC # BLD: 3.8 M/UL — SIGNIFICANT CHANGE UP (ref 3.8–5.2)
RBC # FLD: 12.1 % — SIGNIFICANT CHANGE UP (ref 10.3–14.5)
SODIUM SERPL-SCNC: 136 MMOL/L — SIGNIFICANT CHANGE UP (ref 135–145)
WBC # BLD: 9.69 K/UL — SIGNIFICANT CHANGE UP (ref 3.8–10.5)
WBC # FLD AUTO: 9.69 K/UL — SIGNIFICANT CHANGE UP (ref 3.8–10.5)

## 2018-06-24 PROCEDURE — 74018 RADEX ABDOMEN 1 VIEW: CPT | Mod: 26

## 2018-06-24 PROCEDURE — 99232 SBSQ HOSP IP/OBS MODERATE 35: CPT

## 2018-06-24 RX ORDER — BENZOCAINE AND MENTHOL 5; 1 G/100ML; G/100ML
1 LIQUID ORAL EVERY 4 HOURS
Qty: 0 | Refills: 0 | Status: DISCONTINUED | OUTPATIENT
Start: 2018-06-24 | End: 2018-06-24

## 2018-06-24 RX ORDER — OXYBUTYNIN CHLORIDE 5 MG
1 TABLET ORAL
Qty: 9 | Refills: 0 | OUTPATIENT
Start: 2018-06-24 | End: 2018-06-26

## 2018-06-24 RX ORDER — PHENAZOPYRIDINE HCL 100 MG
1 TABLET ORAL
Qty: 9 | Refills: 0 | OUTPATIENT
Start: 2018-06-24 | End: 2018-06-26

## 2018-06-24 RX ADMIN — SODIUM CHLORIDE 100 MILLILITER(S): 9 INJECTION INTRAMUSCULAR; INTRAVENOUS; SUBCUTANEOUS at 05:33

## 2018-06-24 RX ADMIN — HEPARIN SODIUM 5000 UNIT(S): 5000 INJECTION INTRAVENOUS; SUBCUTANEOUS at 05:35

## 2018-06-24 RX ADMIN — BENZOCAINE AND MENTHOL 1 LOZENGE: 5; 1 LIQUID ORAL at 10:43

## 2018-06-24 RX ADMIN — Medication 4: at 12:49

## 2018-06-24 RX ADMIN — Medication 4: at 08:28

## 2018-06-24 RX ADMIN — Medication 100 MILLIGRAM(S): at 05:35

## 2018-06-24 RX ADMIN — BENZOCAINE AND MENTHOL 1 LOZENGE: 5; 1 LIQUID ORAL at 05:35

## 2018-06-24 RX ADMIN — Medication 81 MILLIGRAM(S): at 11:01

## 2018-06-24 RX ADMIN — Medication 88 MICROGRAM(S): at 05:35

## 2018-06-24 NOTE — DISCHARGE NOTE ADULT - OTHER SIGNIFICANT FINDINGS
Complete Blood Count in AM (06.24.18 @ 07:25)    Nucleated RBC: 0 /100 WBCs    WBC Count: 9.69 K/uL    RBC Count: 3.80 M/uL    Hemoglobin: 11.5 g/dL    Hematocrit: 34.1 %    Mean Cell Volume: 89.7 fl    Mean Cell Hemoglobin: 30.3 pg    Mean Cell Hemoglobin Conc: 33.7 gm/dL    Red Cell Distrib Width: 12.1 %    Platelet Count - Automated: 146 K/uL    Comprehensive Metabolic Panel (06.22.18 @ 17:43)    Sodium, Serum: 141 mmol/L    Potassium, Serum: 3.9 mmol/L    Chloride, Serum: 103 mmol/L    Carbon Dioxide, Serum: 30 mmol/L    Anion Gap, Serum: 8 mmol/L    Blood Urea Nitrogen, Serum: 21 mg/dL    Creatinine, Serum: 1.15 mg/dL    Glucose, Serum: 205 mg/dL    Calcium, Total Serum: 9.3 mg/dL    Protein Total, Serum: 7.6 gm/dL    Albumin, Serum: 3.8 g/dL    Bilirubin Total, Serum: 0.6 mg/dL    Alkaline Phosphatase, Serum: 96 U/L    Aspartate Aminotransferase (AST/SGOT): 14 U/L    Alanine Aminotransferase (ALT/SGPT): 19 U/L    eGFR if Non : 45

## 2018-06-24 NOTE — PROGRESS NOTE ADULT - ASSESSMENT
This is an 77 y/o F with PMHx HTN, DM2, hypothyroidism, chronic back pain s/p neurotomy presents with 1 day LLQ/groin pain. Reports that it started at approximately 3 AM on 6/22 and is described as constant, severe, radiating to the back, associated with nausea and one episode of vomiting. Pain well controlled with morphine. Denies any dysuria, fever, chills, CP, SOB or additional symptoms. No previous history of kidney stones. Reports she sees her urologist, Dr. Tracie Zarco, for monitoring of a renal cyst. Patient reports she was unable to take her medications on day of and day prior to admission due to nausea. Reports rash with penicillins and keflex. Received levofloxacin, tamsulosin and 1500 mL NS bolus in ED.      # Renal colic on left side.   secondary due to obstructive uropathy vs probable UTI  Vitals q8h  Continue levofloxacin 750mg q24 (penicillin/cephalosporin allergy)   Tamsulosin 0.4mg daily   Morphine 2/4 mg q4h for pain  IVF NS at 100 mL/hr for 12 hours  CT appreciated: mild hydro and 9mm calculus  f/u urology consult: Dr. Alvarez.     #Hypothyroidism.    - Continue levothyroxine 88mcg daily.     #Type 2 diabetes mellitus without complication, without long-term current use of insulin.   - continue ISS for now  - Hold oral diabetic medications.       # Essential hypertension.    - Hold losartan and metoprolol on admission due to low diastolic pressure in 40's  - continue to monitor BP  - NPO, f/u with urology Dr. Alvarez - if no invasive procedure scheduled start pt on DASH DIET    #Prophylactic measure.  Plan: Heparin SQ.       Case Discussed with Dr. Edmund Espinoza
Will need definitive management of stones at a later with either ESWL(if stone are radio opaque) or Ureteroscopy, laser lithotripsy, stone extraction and stent exchange. Will contact Pt.  Will follow KUB. Can be discharged. Pyridium 100 mg TID x 3 days.  Oxybutynin 5 mg TID PRN Bladder spasms.

## 2018-06-24 NOTE — DISCHARGE NOTE ADULT - PLAN OF CARE
To not have worsening pain - Follow up with your urologist Dr Pack for further management  - Take Pyridium 100mg three times a day for three days  - Take Oxybutynin if you experience bladder spasms  - Take percocet every 6 hours as needed for pain  - Return to the ED if you experience fevers, chills, pain or vomiting not controlled by medications - Return to your home diabetes regiment

## 2018-06-24 NOTE — DISCHARGE NOTE ADULT - CARE PLAN
Principal Discharge DX:	Kidney stone on left side  Goal:	To not have worsening pain  Assessment and plan of treatment:	- Follow up with your urologist Dr Pack for further management  - Take Pyridium 100mg three times a day for three days  - Take Oxybutynin if you experience bladder spasms  - Take percocet every 6 hours as needed for pain  - Return to the ED if you experience fevers, chills, pain or vomiting not controlled by medications  Secondary Diagnosis:	Type 2 diabetes mellitus without complication, without long-term current use of insulin  Assessment and plan of treatment:	- Return to your home diabetes regiment

## 2018-06-24 NOTE — DISCHARGE NOTE ADULT - PATIENT PORTAL LINK FT
You can access the [email or web address] Patient Portal, offered by [facility], by registering with the following website: [email or web address]

## 2018-06-24 NOTE — DISCHARGE NOTE ADULT - MEDICATION SUMMARY - MEDICATIONS TO TAKE
I will START or STAY ON the medications listed below when I get home from the hospital:    aspirin 81 mg oral delayed release tablet  -- 1 tab(s) by mouth once a day  -- Indication: For Coronary Artery Disease     oxyCODONE-acetaminophen 5 mg-325 mg oral tablet  -- 1 tab(s) by mouth every 6 hours MDD:3 tabs  -- Caution federal law prohibits the transfer of this drug to any person other  than the person for whom it was prescribed.  May cause drowsiness.  Alcohol may intensify this effect.  Use care when operating dangerous machinery.  This prescription cannot be refilled.  This product contains acetaminophen.  Do not use  with any other product containing acetaminophen to prevent possible liver damage.  Using more of this medication than prescribed may cause serious breathing problems.    -- Indication: For Renal colic    losartan 50 mg oral tablet  -- 1 tab(s) by mouth once a day  -- Indication: For Hypertension    glimepiride 2 mg oral tablet  -- 2 tab(s) by mouth 2 times a day  -- Indication: For DM (diabetes mellitus)    Kombiglyze XR  -- 1 dose(s) by mouth once a day  -- Indication: For DM (diabetes mellitus)    Crestor  -- 5 milligram(s) by mouth once a day  -- Indication: For Hyperlipidemia    Metoprolol Tartrate 100 mg oral tablet  -- 1 tab(s) by mouth 2 times a day  -- Indication: For Hypertension    phenazopyridine 100 mg oral tablet  -- 1 tab(s) by mouth 3 times a day  -- Indication: For Renal colic    levothyroxine 88 mcg (0.088 mg) oral tablet  -- 1 tab(s) by mouth once a day  -- Indication: For Hypothyroidism    oxybutynin 5 mg oral tablet  -- 1 tab(s) by mouth 3 times a day, As needed, Bladder spasms  -- Indication: For Renal colic I will START or STAY ON the medications listed below when I get home from the hospital:    aspirin 81 mg oral delayed release tablet  -- 1 tab(s) by mouth once a day  -- Indication: For Coronary Artery Disease     oxyCODONE-acetaminophen 5 mg-325 mg oral tablet  -- 1 tab(s) by mouth every 6 hours MDD:3 tabs  -- Caution federal law prohibits the transfer of this drug to any person other  than the person for whom it was prescribed.  May cause drowsiness.  Alcohol may intensify this effect.  Use care when operating dangerous machinery.  This prescription cannot be refilled.  This product contains acetaminophen.  Do not use  with any other product containing acetaminophen to prevent possible liver damage.  Using more of this medication than prescribed may cause serious breathing problems.    -- Indication: For Renal colic    losartan 50 mg oral tablet  -- 1 tab(s) by mouth once a day  -- Indication: For Hypertension    glimepiride 2 mg oral tablet  -- 2 tab(s) by mouth 2 times a day  -- Indication: For DM (diabetes mellitus)    Kombiglyze XR  -- 1 dose(s) by mouth once a day  -- Indication: For DM (diabetes mellitus)    Crestor  -- 5 milligram(s) by mouth once a day  -- Indication: For Hyperlipidemia    Metoprolol Tartrate 100 mg oral tablet  -- 1 tab(s) by mouth 2 times a day  -- Indication: For Hypertension    phenazopyridine 100 mg oral tablet  -- 1 tab(s) by mouth 3 times a day  -- Indication: For Renal colic    levothyroxine 88 mcg (0.088 mg) oral tablet  -- 1 tab(s) by mouth once a day  -- Indication: For Hypothyroidism

## 2018-06-24 NOTE — DISCHARGE NOTE ADULT - CARE PROVIDERS DIRECT ADDRESSES
[email or web address]

## 2018-06-24 NOTE — DISCHARGE NOTE ADULT - CARE PROVIDER_API CALL
Vesna Aguilar), Internal Medicine  [address]  [address]  [place] [address]  Phone: [phone]  Fax: [phone]    Les Hodges), Urology  [address] [place] [address] [address] [place] [id number]  Phone: [phone]  Fax: [phone]

## 2018-06-24 NOTE — DISCHARGE NOTE ADULT - HOSPITAL COURSE
77 y/o F with PMHx HTN, DM2, hypothyroidism, chronic back pain s/p neurotomy presents with 1 day LLQ/groin pain. Reports that it started at approximately 3 AM on 6/22 and is described as constant, severe, radiating to the back, associated with nausea and one episode of vomiting. Pain well controlled with morphine. Denies any dysuria, fever, chills, CP, SOB or additional symptoms. No previous history of kidney stones. Reports she sees her urologist, Dr. Hodges Sr., for monitoring of a renal cyst. Patient reports she was unable to take her medications on day of and day prior to admission due to nausea.  Reports rash with penicillins and keflex. Received levofloxacin, tamsulosin and 1500 mL NS bolus in ED. (23 Jun 2018 02:47)    During this admission, pt received a cystoscopy and a stent was placed. Pt has no pain on discharge and will follow up with Dr Hodges.    PE  Vital Signs Last 24 Hrs  T(C): 36.5 (24 Jun 2018 13:13), Max: 36.9 (23 Jun 2018 21:35)  T(F): 97.7 (24 Jun 2018 13:13), Max: 98.4 (23 Jun 2018 21:35)  HR: 93 (24 Jun 2018 13:13) (74 - 120)  BP: 114/48 (24 Jun 2018 04:32) (104/87 - 145/60)  BP(mean): --  RR: 19 (24 Jun 2018 13:13) (15 - 19)  SpO2: 95% (24 Jun 2018 13:13) (95% - 98%)    Constitutional: Pt lying in bed, awake and alert, NAD  HEENT: EOMI, normal hearing, moist mucous membranes  Neck: Soft and supple, no JVD  Respiratory: CTABL, No wheezing, rales or rhonchi  Cardiovascular: S1S2+, RRR, no M/G/R  Gastrointestinal: BS+, soft, NT/ND, no guarding, no rebound  Extremities: No peripheral edema  Vascular: 2+ peripheral pulses  Neurological: AAOx3, no focal deficits  Musculoskeletal: 5/5 strength b/l upper and lower extremities  Skin: No rashes

## 2018-06-25 PROBLEM — Z00.00 ENCOUNTER FOR PREVENTIVE HEALTH EXAMINATION: Status: ACTIVE | Noted: 2018-06-25

## 2018-06-26 LAB
CULTURE RESULTS: SIGNIFICANT CHANGE UP
SPECIMEN SOURCE: SIGNIFICANT CHANGE UP

## 2018-06-29 DIAGNOSIS — N20.0 CALCULUS OF KIDNEY: ICD-10-CM

## 2018-06-29 DIAGNOSIS — E11.9 TYPE 2 DIABETES MELLITUS WITHOUT COMPLICATIONS: ICD-10-CM

## 2018-06-29 DIAGNOSIS — N13.2 HYDRONEPHROSIS WITH RENAL AND URETERAL CALCULOUS OBSTRUCTION: ICD-10-CM

## 2018-06-29 DIAGNOSIS — Z88.0 ALLERGY STATUS TO PENICILLIN: ICD-10-CM

## 2018-06-29 DIAGNOSIS — G89.29 OTHER CHRONIC PAIN: ICD-10-CM

## 2018-06-29 DIAGNOSIS — E03.9 HYPOTHYROIDISM, UNSPECIFIED: ICD-10-CM

## 2018-06-29 DIAGNOSIS — N23 UNSPECIFIED RENAL COLIC: ICD-10-CM

## 2018-06-29 DIAGNOSIS — I10 ESSENTIAL (PRIMARY) HYPERTENSION: ICD-10-CM

## 2018-06-29 DIAGNOSIS — M54.9 DORSALGIA, UNSPECIFIED: ICD-10-CM

## 2018-07-02 ENCOUNTER — APPOINTMENT (OUTPATIENT)
Dept: UROLOGY | Facility: CLINIC | Age: 78
End: 2018-07-02

## 2018-07-02 PROBLEM — E11.9 TYPE 2 DIABETES MELLITUS WITHOUT COMPLICATIONS: Chronic | Status: ACTIVE | Noted: 2018-06-22

## 2018-07-02 PROBLEM — I10 ESSENTIAL (PRIMARY) HYPERTENSION: Chronic | Status: ACTIVE | Noted: 2018-06-22

## 2018-07-02 PROBLEM — E03.9 HYPOTHYROIDISM, UNSPECIFIED: Chronic | Status: ACTIVE | Noted: 2018-06-22

## 2018-07-09 RX ORDER — SODIUM CHLORIDE 9 MG/ML
1000 INJECTION INTRAMUSCULAR; INTRAVENOUS; SUBCUTANEOUS
Qty: 0 | Refills: 0 | Status: DISCONTINUED | OUTPATIENT
Start: 2018-07-10 | End: 2018-07-10

## 2018-07-10 ENCOUNTER — RESULT REVIEW (OUTPATIENT)
Age: 78
End: 2018-07-10

## 2018-07-10 ENCOUNTER — APPOINTMENT (OUTPATIENT)
Dept: UROLOGY | Facility: HOSPITAL | Age: 78
End: 2018-07-10

## 2018-07-10 ENCOUNTER — OUTPATIENT (OUTPATIENT)
Dept: OUTPATIENT SERVICES | Facility: HOSPITAL | Age: 78
LOS: 1 days | Discharge: HOME | End: 2018-07-10
Payer: MEDICARE

## 2018-07-10 ENCOUNTER — OTHER (OUTPATIENT)
Age: 78
End: 2018-07-10

## 2018-07-10 VITALS
HEART RATE: 58 BPM | RESPIRATION RATE: 14 BRPM | TEMPERATURE: 98 F | OXYGEN SATURATION: 99 % | DIASTOLIC BLOOD PRESSURE: 58 MMHG | SYSTOLIC BLOOD PRESSURE: 138 MMHG

## 2018-07-10 VITALS
DIASTOLIC BLOOD PRESSURE: 56 MMHG | TEMPERATURE: 98 F | OXYGEN SATURATION: 97 % | SYSTOLIC BLOOD PRESSURE: 148 MMHG | WEIGHT: 250.45 LBS | HEART RATE: 66 BPM | RESPIRATION RATE: 17 BRPM

## 2018-07-10 DIAGNOSIS — Z90.89 ACQUIRED ABSENCE OF OTHER ORGANS: Chronic | ICD-10-CM

## 2018-07-10 DIAGNOSIS — N61.1 ABSCESS OF THE BREAST AND NIPPLE: Chronic | ICD-10-CM

## 2018-07-10 DIAGNOSIS — Z46.6 ENCOUNTER FOR FITTING AND ADJUSTMENT OF URINARY DEVICE: Chronic | ICD-10-CM

## 2018-07-10 DIAGNOSIS — N75.0 CYST OF BARTHOLIN'S GLAND: Chronic | ICD-10-CM

## 2018-07-10 DIAGNOSIS — Z90.49 ACQUIRED ABSENCE OF OTHER SPECIFIED PARTS OF DIGESTIVE TRACT: Chronic | ICD-10-CM

## 2018-07-10 DIAGNOSIS — Z96.612 PRESENCE OF LEFT ARTIFICIAL SHOULDER JOINT: Chronic | ICD-10-CM

## 2018-07-10 DIAGNOSIS — O00.90 UNSPECIFIED ECTOPIC PREGNANCY WITHOUT INTRAUTERINE PREGNANCY: Chronic | ICD-10-CM

## 2018-07-10 LAB — GLUCOSE BLDC GLUCOMTR-MCNC: 139 MG/DL — HIGH (ref 70–99)

## 2018-07-10 PROCEDURE — 52356 CYSTO/URETERO W/LITHOTRIPSY: CPT | Mod: LT

## 2018-07-10 PROCEDURE — 88300 SURGICAL PATH GROSS: CPT | Mod: 26

## 2018-07-10 RX ORDER — ONDANSETRON 8 MG/1
4 TABLET, FILM COATED ORAL ONCE
Qty: 0 | Refills: 0 | Status: DISCONTINUED | OUTPATIENT
Start: 2018-07-10 | End: 2018-07-10

## 2018-07-10 RX ORDER — PHENAZOPYRIDINE HCL 100 MG
200 TABLET ORAL ONCE
Qty: 0 | Refills: 0 | Status: COMPLETED | OUTPATIENT
Start: 2018-07-10 | End: 2018-07-10

## 2018-07-10 RX ORDER — ALBUTEROL 90 UG/1
2 AEROSOL, METERED ORAL
Qty: 0 | Refills: 0 | COMMUNITY

## 2018-07-10 RX ORDER — ACETAMINOPHEN 500 MG
1000 TABLET ORAL ONCE
Qty: 0 | Refills: 0 | Status: COMPLETED | OUTPATIENT
Start: 2018-07-10 | End: 2018-07-10

## 2018-07-10 RX ORDER — FENTANYL CITRATE 50 UG/ML
50 INJECTION INTRAVENOUS
Qty: 0 | Refills: 0 | Status: DISCONTINUED | OUTPATIENT
Start: 2018-07-10 | End: 2018-07-10

## 2018-07-10 RX ORDER — ROSUVASTATIN CALCIUM 5 MG/1
5 TABLET ORAL
Qty: 0 | Refills: 0 | COMMUNITY

## 2018-07-10 RX ORDER — PHENAZOPYRIDINE HCL 100 MG
1 TABLET ORAL
Qty: 9 | Refills: 0 | OUTPATIENT
Start: 2018-07-10 | End: 2018-07-12

## 2018-07-10 RX ORDER — FLUTICASONE PROPIONATE 50 MCG
1 SPRAY, SUSPENSION NASAL
Qty: 0 | Refills: 0 | COMMUNITY

## 2018-07-10 RX ADMIN — SODIUM CHLORIDE 75 MILLILITER(S): 9 INJECTION INTRAMUSCULAR; INTRAVENOUS; SUBCUTANEOUS at 17:03

## 2018-07-10 RX ADMIN — Medication 200 MILLIGRAM(S): at 17:19

## 2018-07-10 RX ADMIN — Medication 1000 MILLIGRAM(S): at 18:05

## 2018-07-10 RX ADMIN — Medication 400 MILLIGRAM(S): at 17:28

## 2018-07-10 NOTE — ASU PATIENT PROFILE, ADULT - PSH
Encounter for removal of ureteral stent    S/P cholecystectomy Abscess of breast  drainage of abscess left breast  Bartholin cyst    Ectopic pregnancy    Encounter for removal of ureteral stent    History of tonsillectomy    S/P cholecystectomy    S/P shoulder replacement, left  partial

## 2018-07-10 NOTE — ASU DISCHARGE PLAN (ADULT/PEDIATRIC). - NOTIFY
Persistent Nausea and Vomiting/Inability to Tolerate Liquids or Foods/Pain not relieved by Medications/Fever greater than 101

## 2018-07-10 NOTE — BRIEF OPERATIVE NOTE - PROCEDURE
<<-----Click on this checkbox to enter Procedure Left ureteroscopy with laser lithotripsy  07/10/2018    Active  THERON

## 2018-07-10 NOTE — ASU PATIENT PROFILE, ADULT - PMH
Arytenoid finding  left arytenoid and vocal fold hematoma secondary to difficult intubation  Chronic back pain    DM (diabetes mellitus)    Hypercholesterolemia    Hypertension    Hypothyroidism    Hypovitaminosis D    Obesity    Poliomyelitis    Renal cyst    Spinal stenosis

## 2018-07-10 NOTE — ASU DISCHARGE PLAN (ADULT/PEDIATRIC). - MEDICATION SUMMARY - MEDICATIONS TO TAKE
I will START or STAY ON the medications listed below when I get home from the hospital:    aspirin 81 mg oral delayed release tablet  -- 1 tab(s) by mouth once a day  -- Indication: For Home med     losartan 50 mg oral tablet  -- 1 tab(s) by mouth once a day  -- Indication: For Home med     glimepiride 2 mg oral tablet  -- 2 tab(s) by mouth 2 times a day  -- Indication: For Home med     Kombiglyze XR  -- 1 dose(s) by mouth once a day  -- Indication: For Home med     Metoprolol Tartrate 100 mg oral tablet  -- 1 tab(s) by mouth 2 times a day  -- Indication: For Home med     Pyridium 100 mg oral tablet  -- 1 tab(s) by mouth 3 times a day (after meals)   -- May discolor urine or feces.  Medication should be taken with plenty of water.  Take with food or milk.    -- Indication: For S/P Ureteroscopy     levothyroxine 88 mcg (0.088 mg) oral tablet  -- 1 tab(s) by mouth once a day  -- Indication: For Home med     Calcium 600+D Plus Minerals oral tablet, chewable  -- 1 tab(s) by mouth once a day  -- Indication: For Home med     Vitamin D3 1000 intl units oral tablet  -- 2 tab(s) by mouth once a day  -- Indication: For Home med     Vitamin B12 1000 mcg oral tablet  -- 1 tab(s) by mouth once a day  -- Indication: For Home med

## 2018-07-11 LAB — SURGICAL PATHOLOGY FINAL REPORT - CH: SIGNIFICANT CHANGE UP

## 2018-07-13 DIAGNOSIS — Z88.0 ALLERGY STATUS TO PENICILLIN: ICD-10-CM

## 2018-07-13 DIAGNOSIS — I45.10 UNSPECIFIED RIGHT BUNDLE-BRANCH BLOCK: ICD-10-CM

## 2018-07-13 DIAGNOSIS — Z82.49 FAMILY HISTORY OF ISCHEMIC HEART DISEASE AND OTHER DISEASES OF THE CIRCULATORY SYSTEM: ICD-10-CM

## 2018-07-13 DIAGNOSIS — Z83.3 FAMILY HISTORY OF DIABETES MELLITUS: ICD-10-CM

## 2018-07-13 DIAGNOSIS — Z88.1 ALLERGY STATUS TO OTHER ANTIBIOTIC AGENTS STATUS: ICD-10-CM

## 2018-07-13 DIAGNOSIS — Z79.84 LONG TERM (CURRENT) USE OF ORAL HYPOGLYCEMIC DRUGS: ICD-10-CM

## 2018-07-13 DIAGNOSIS — N20.0 CALCULUS OF KIDNEY: ICD-10-CM

## 2018-07-13 DIAGNOSIS — E66.9 OBESITY, UNSPECIFIED: ICD-10-CM

## 2018-07-13 DIAGNOSIS — I10 ESSENTIAL (PRIMARY) HYPERTENSION: ICD-10-CM

## 2018-07-13 DIAGNOSIS — E03.9 HYPOTHYROIDISM, UNSPECIFIED: ICD-10-CM

## 2018-07-13 DIAGNOSIS — Z79.82 LONG TERM (CURRENT) USE OF ASPIRIN: ICD-10-CM

## 2018-07-13 DIAGNOSIS — Z90.49 ACQUIRED ABSENCE OF OTHER SPECIFIED PARTS OF DIGESTIVE TRACT: ICD-10-CM

## 2018-07-13 DIAGNOSIS — E11.9 TYPE 2 DIABETES MELLITUS WITHOUT COMPLICATIONS: ICD-10-CM

## 2018-07-16 LAB — COMPN STONE: SIGNIFICANT CHANGE UP

## 2018-07-18 ENCOUNTER — APPOINTMENT (OUTPATIENT)
Dept: UROLOGY | Facility: CLINIC | Age: 78
End: 2018-07-18
Payer: MEDICARE

## 2018-07-18 VITALS
BODY MASS INDEX: 44.3 KG/M2 | WEIGHT: 250 LBS | SYSTOLIC BLOOD PRESSURE: 141 MMHG | HEIGHT: 63 IN | HEART RATE: 64 BPM | OXYGEN SATURATION: 98 % | DIASTOLIC BLOOD PRESSURE: 71 MMHG

## 2018-07-18 DIAGNOSIS — Z46.6 ENCOUNTER FOR FITTING AND ADJUSTMENT OF URINARY DEVICE: ICD-10-CM

## 2018-07-18 PROBLEM — N28.1 CYST OF KIDNEY, ACQUIRED: Chronic | Status: ACTIVE | Noted: 2018-07-10

## 2018-07-18 PROBLEM — M48.00 SPINAL STENOSIS, SITE UNSPECIFIED: Chronic | Status: ACTIVE | Noted: 2018-07-10

## 2018-07-18 PROBLEM — A80.9 ACUTE POLIOMYELITIS, UNSPECIFIED: Chronic | Status: ACTIVE | Noted: 2018-07-10

## 2018-07-18 PROBLEM — E55.9 VITAMIN D DEFICIENCY, UNSPECIFIED: Chronic | Status: ACTIVE | Noted: 2018-07-10

## 2018-07-18 PROBLEM — J38.3 OTHER DISEASES OF VOCAL CORDS: Chronic | Status: ACTIVE | Noted: 2018-07-10

## 2018-07-18 PROBLEM — M54.9 DORSALGIA, UNSPECIFIED: Chronic | Status: ACTIVE | Noted: 2018-06-23

## 2018-07-18 PROBLEM — E66.9 OBESITY, UNSPECIFIED: Chronic | Status: ACTIVE | Noted: 2018-07-10

## 2018-07-18 PROBLEM — E78.00 PURE HYPERCHOLESTEROLEMIA, UNSPECIFIED: Chronic | Status: ACTIVE | Noted: 2018-07-10

## 2018-07-18 PROCEDURE — 52310 CYSTOSCOPY AND TREATMENT: CPT

## 2018-07-25 LAB — COMPN STONE: NORMAL

## 2018-07-31 ENCOUNTER — INPATIENT (INPATIENT)
Facility: HOSPITAL | Age: 78
LOS: 7 days | Discharge: HOME | End: 2018-08-08
Attending: FAMILY MEDICINE | Admitting: FAMILY MEDICINE
Payer: MEDICARE

## 2018-07-31 VITALS
RESPIRATION RATE: 18 BRPM | HEART RATE: 72 BPM | TEMPERATURE: 99 F | SYSTOLIC BLOOD PRESSURE: 166 MMHG | OXYGEN SATURATION: 95 % | WEIGHT: 229.94 LBS | HEIGHT: 63 IN | DIASTOLIC BLOOD PRESSURE: 85 MMHG

## 2018-07-31 DIAGNOSIS — Z46.6 ENCOUNTER FOR FITTING AND ADJUSTMENT OF URINARY DEVICE: Chronic | ICD-10-CM

## 2018-07-31 DIAGNOSIS — O00.90 UNSPECIFIED ECTOPIC PREGNANCY WITHOUT INTRAUTERINE PREGNANCY: Chronic | ICD-10-CM

## 2018-07-31 DIAGNOSIS — N75.0 CYST OF BARTHOLIN'S GLAND: Chronic | ICD-10-CM

## 2018-07-31 DIAGNOSIS — N61.1 ABSCESS OF THE BREAST AND NIPPLE: Chronic | ICD-10-CM

## 2018-07-31 DIAGNOSIS — Z96.612 PRESENCE OF LEFT ARTIFICIAL SHOULDER JOINT: Chronic | ICD-10-CM

## 2018-07-31 DIAGNOSIS — Z90.49 ACQUIRED ABSENCE OF OTHER SPECIFIED PARTS OF DIGESTIVE TRACT: Chronic | ICD-10-CM

## 2018-07-31 DIAGNOSIS — Z90.89 ACQUIRED ABSENCE OF OTHER ORGANS: Chronic | ICD-10-CM

## 2018-07-31 LAB
ALBUMIN SERPL ELPH-MCNC: 3.5 G/DL — SIGNIFICANT CHANGE UP (ref 3.3–5)
ALP SERPL-CCNC: 98 U/L — SIGNIFICANT CHANGE UP (ref 40–120)
ALT FLD-CCNC: 22 U/L — SIGNIFICANT CHANGE UP (ref 12–78)
ANION GAP SERPL CALC-SCNC: 7 MMOL/L — SIGNIFICANT CHANGE UP (ref 5–17)
APTT BLD: 33.2 SEC — SIGNIFICANT CHANGE UP (ref 27.5–37.4)
AST SERPL-CCNC: 13 U/L — LOW (ref 15–37)
BASE EXCESS BLDV CALC-SCNC: 3.3 MMOL/L — HIGH (ref -2–2)
BASOPHILS # BLD AUTO: 0.03 K/UL — SIGNIFICANT CHANGE UP (ref 0–0.2)
BASOPHILS NFR BLD AUTO: 0.3 % — SIGNIFICANT CHANGE UP (ref 0–2)
BILIRUB SERPL-MCNC: 0.4 MG/DL — SIGNIFICANT CHANGE UP (ref 0.2–1.2)
BUN SERPL-MCNC: 16 MG/DL — SIGNIFICANT CHANGE UP (ref 7–23)
CALCIUM SERPL-MCNC: 9 MG/DL — SIGNIFICANT CHANGE UP (ref 8.5–10.1)
CHLORIDE SERPL-SCNC: 105 MMOL/L — SIGNIFICANT CHANGE UP (ref 96–108)
CO2 SERPL-SCNC: 29 MMOL/L — SIGNIFICANT CHANGE UP (ref 22–31)
CREAT SERPL-MCNC: 1.29 MG/DL — SIGNIFICANT CHANGE UP (ref 0.5–1.3)
EOSINOPHIL # BLD AUTO: 0.17 K/UL — SIGNIFICANT CHANGE UP (ref 0–0.5)
EOSINOPHIL NFR BLD AUTO: 1.9 % — SIGNIFICANT CHANGE UP (ref 0–6)
GLUCOSE SERPL-MCNC: 132 MG/DL — HIGH (ref 70–99)
HCO3 BLDV-SCNC: 30 MMOL/L — HIGH (ref 21–29)
HCT VFR BLD CALC: 36.8 % — SIGNIFICANT CHANGE UP (ref 34.5–45)
HGB BLD-MCNC: 12.3 G/DL — SIGNIFICANT CHANGE UP (ref 11.5–15.5)
IMM GRANULOCYTES NFR BLD AUTO: 0.2 % — SIGNIFICANT CHANGE UP (ref 0–1.5)
INR BLD: 1.05 RATIO — SIGNIFICANT CHANGE UP (ref 0.88–1.16)
LYMPHOCYTES # BLD AUTO: 1.98 K/UL — SIGNIFICANT CHANGE UP (ref 1–3.3)
LYMPHOCYTES # BLD AUTO: 21.7 % — SIGNIFICANT CHANGE UP (ref 13–44)
MCHC RBC-ENTMCNC: 29.2 PG — SIGNIFICANT CHANGE UP (ref 27–34)
MCHC RBC-ENTMCNC: 33.4 GM/DL — SIGNIFICANT CHANGE UP (ref 32–36)
MCV RBC AUTO: 87.4 FL — SIGNIFICANT CHANGE UP (ref 80–100)
MONOCYTES # BLD AUTO: 0.89 K/UL — SIGNIFICANT CHANGE UP (ref 0–0.9)
MONOCYTES NFR BLD AUTO: 9.8 % — SIGNIFICANT CHANGE UP (ref 2–14)
NEUTROPHILS # BLD AUTO: 6.03 K/UL — SIGNIFICANT CHANGE UP (ref 1.8–7.4)
NEUTROPHILS NFR BLD AUTO: 66.1 % — SIGNIFICANT CHANGE UP (ref 43–77)
NRBC # BLD: 0 /100 WBCS — SIGNIFICANT CHANGE UP (ref 0–0)
NT-PROBNP SERPL-SCNC: 245 PG/ML — SIGNIFICANT CHANGE UP (ref 0–450)
PCO2 BLDV: 57 MMHG — HIGH (ref 35–50)
PH BLDV: 7.34 — LOW (ref 7.35–7.45)
PLATELET # BLD AUTO: 213 K/UL — SIGNIFICANT CHANGE UP (ref 150–400)
PO2 BLDV: 76 MMHG — HIGH (ref 25–45)
POTASSIUM SERPL-MCNC: 3.8 MMOL/L — SIGNIFICANT CHANGE UP (ref 3.5–5.3)
POTASSIUM SERPL-SCNC: 3.8 MMOL/L — SIGNIFICANT CHANGE UP (ref 3.5–5.3)
PROT SERPL-MCNC: 6.9 GM/DL — SIGNIFICANT CHANGE UP (ref 6–8.3)
PROTHROM AB SERPL-ACNC: 11.3 SEC — SIGNIFICANT CHANGE UP (ref 9.8–12.7)
RBC # BLD: 4.21 M/UL — SIGNIFICANT CHANGE UP (ref 3.8–5.2)
RBC # FLD: 13.3 % — SIGNIFICANT CHANGE UP (ref 10.3–14.5)
SAO2 % BLDV: 94 % — HIGH (ref 67–88)
SODIUM SERPL-SCNC: 141 MMOL/L — SIGNIFICANT CHANGE UP (ref 135–145)
TROPONIN I SERPL-MCNC: <0.015 NG/ML — SIGNIFICANT CHANGE UP (ref 0.01–0.04)
WBC # BLD: 9.12 K/UL — SIGNIFICANT CHANGE UP (ref 3.8–10.5)
WBC # FLD AUTO: 9.12 K/UL — SIGNIFICANT CHANGE UP (ref 3.8–10.5)

## 2018-07-31 PROCEDURE — 93010 ELECTROCARDIOGRAM REPORT: CPT

## 2018-07-31 PROCEDURE — 71045 X-RAY EXAM CHEST 1 VIEW: CPT | Mod: 26

## 2018-07-31 RX ORDER — SODIUM CHLORIDE 9 MG/ML
3 INJECTION INTRAMUSCULAR; INTRAVENOUS; SUBCUTANEOUS ONCE
Qty: 0 | Refills: 0 | Status: COMPLETED | OUTPATIENT
Start: 2018-07-31 | End: 2018-07-31

## 2018-07-31 RX ORDER — SODIUM CHLORIDE 9 MG/ML
1000 INJECTION INTRAMUSCULAR; INTRAVENOUS; SUBCUTANEOUS ONCE
Qty: 0 | Refills: 0 | Status: COMPLETED | OUTPATIENT
Start: 2018-07-31 | End: 2018-07-31

## 2018-07-31 RX ORDER — MORPHINE SULFATE 50 MG/1
4 CAPSULE, EXTENDED RELEASE ORAL ONCE
Qty: 0 | Refills: 0 | Status: DISCONTINUED | OUTPATIENT
Start: 2018-07-31 | End: 2018-07-31

## 2018-07-31 RX ADMIN — SODIUM CHLORIDE 3 MILLILITER(S): 9 INJECTION INTRAMUSCULAR; INTRAVENOUS; SUBCUTANEOUS at 23:00

## 2018-07-31 RX ADMIN — MORPHINE SULFATE 4 MILLIGRAM(S): 50 CAPSULE, EXTENDED RELEASE ORAL at 23:32

## 2018-07-31 RX ADMIN — SODIUM CHLORIDE 1000 MILLILITER(S): 9 INJECTION INTRAMUSCULAR; INTRAVENOUS; SUBCUTANEOUS at 23:32

## 2018-07-31 NOTE — ED PROVIDER NOTE - CRITICAL CARE PROVIDED
consult w/ pt's family directly relating to pts condition/consultation with other physicians/direct patient care (not related to procedure)/documentation

## 2018-07-31 NOTE — ED PROVIDER NOTE - PSH
Abscess of breast  drainage of abscess left breast  Bartholin cyst    Ectopic pregnancy    Encounter for removal of ureteral stent    History of tonsillectomy    S/P cholecystectomy    S/P shoulder replacement, left  partial

## 2018-07-31 NOTE — ED PROVIDER NOTE - MEDICAL DECISION MAKING DETAILS
77 y/o F with PMHx HTN, DM2, hypothyroidism, chronic back pain, urolithiasis 9mm s/p ?lithotripsy s/p stent s/p removal Dr. Willams 10 JUL c/b laryngeal inflammation s/p neurotomy p/w sudden onset RUQ pain at 9:15 this evening.  VSS WNL. RUQ tenderness on exam. Given findings and recent instrumentation CT AP CT chest.  DDX stone, obstruction, cholecystitis.  CBC to screen for anemia, hematologic dyscrasia assess platelet count, evaluate for signs of infection, and screen for signs of hematologic malignancy. CMP to screen for electrolyte abnormality, assess renal function, liver function, biliary function, acid base status, fluid balance, and blood glucose. Given SOB R/O ACS, Chest radiograph, carotid pulses, blood pressure in both arms, to screen for dissection, pneumothorax, pneumonia, effusion, Boerhaave Syndrome, perforation, and atypical causes of chest pain. Serial EKGs to assess for STEMI.  Repeat study with worsening chest pain, positive/repeat cardiac enzymes studies to assess for dynamic EKG changes and ischemia.  Symptomatic treatment, reassess.

## 2018-07-31 NOTE — ED PROVIDER NOTE - PROGRESS NOTE DETAILS
spoke with Dr. Ta from [facility], spoke with  Dr. Purcell for admission pt to go to [facility], spoke with pt and her family member at bedside. pt hemodynamically stable, no hypoxia with supplemental oxygen to go to [facility] heparin started ISAURA Wilhelm DO

## 2018-07-31 NOTE — ED PROVIDER NOTE - OBJECTIVE STATEMENT
77 y/o F with PMHx HTN, DM2, hypothyroidism, chronic back pain, urolithiasis 9mm s/p ?lithotripsy s/p stent s/p removal Dr. Willams 10 JUL c/b laryngeal inflammation s/p neurotomy p/w sudden onset RUQ pain at 9:15 this evening.  Sx severe, refractory to hydrocodone.  Sx severe causing SOB.  No fevers, chills, sweats, weight loss, fatigue, or malaise.

## 2018-08-01 LAB
ANION GAP SERPL CALC-SCNC: 7 MMOL/L — SIGNIFICANT CHANGE UP (ref 5–17)
APTT BLD: 156 SEC — CRITICAL HIGH (ref 27.5–37.4)
APTT BLD: > 200 SEC (ref 27.5–37.4)
BUN SERPL-MCNC: 16 MG/DL — SIGNIFICANT CHANGE UP (ref 7–23)
CALCIUM SERPL-MCNC: 8.5 MG/DL — SIGNIFICANT CHANGE UP (ref 8.5–10.1)
CHLORIDE SERPL-SCNC: 105 MMOL/L — SIGNIFICANT CHANGE UP (ref 96–108)
CO2 SERPL-SCNC: 28 MMOL/L — SIGNIFICANT CHANGE UP (ref 22–31)
CREAT SERPL-MCNC: 1.06 MG/DL — SIGNIFICANT CHANGE UP (ref 0.5–1.3)
GLUCOSE BLDC GLUCOMTR-MCNC: 147 MG/DL — HIGH (ref 70–99)
GLUCOSE BLDC GLUCOMTR-MCNC: 163 MG/DL — HIGH (ref 70–99)
GLUCOSE BLDC GLUCOMTR-MCNC: 165 MG/DL — HIGH (ref 70–99)
GLUCOSE SERPL-MCNC: 159 MG/DL — HIGH (ref 70–99)
HCT VFR BLD CALC: 33.7 % — LOW (ref 34.5–45)
HGB BLD-MCNC: 11.4 G/DL — LOW (ref 11.5–15.5)
INR BLD: 1.14 RATIO — SIGNIFICANT CHANGE UP (ref 0.88–1.16)
MCHC RBC-ENTMCNC: 30.4 PG — SIGNIFICANT CHANGE UP (ref 27–34)
MCHC RBC-ENTMCNC: 33.8 GM/DL — SIGNIFICANT CHANGE UP (ref 32–36)
MCV RBC AUTO: 89.9 FL — SIGNIFICANT CHANGE UP (ref 80–100)
NRBC # BLD: 0 /100 WBCS — SIGNIFICANT CHANGE UP (ref 0–0)
PLATELET # BLD AUTO: 165 K/UL — SIGNIFICANT CHANGE UP (ref 150–400)
POTASSIUM SERPL-MCNC: 3.5 MMOL/L — SIGNIFICANT CHANGE UP (ref 3.5–5.3)
POTASSIUM SERPL-SCNC: 3.5 MMOL/L — SIGNIFICANT CHANGE UP (ref 3.5–5.3)
PROTHROM AB SERPL-ACNC: 12.3 SEC — SIGNIFICANT CHANGE UP (ref 9.8–12.7)
RBC # BLD: 3.75 M/UL — LOW (ref 3.8–5.2)
RBC # FLD: 13.4 % — SIGNIFICANT CHANGE UP (ref 10.3–14.5)
SODIUM SERPL-SCNC: 140 MMOL/L — SIGNIFICANT CHANGE UP (ref 135–145)
WBC # BLD: 7.29 K/UL — SIGNIFICANT CHANGE UP (ref 3.8–10.5)
WBC # FLD AUTO: 7.29 K/UL — SIGNIFICANT CHANGE UP (ref 3.8–10.5)

## 2018-08-01 PROCEDURE — 71260 CT THORAX DX C+: CPT | Mod: 26

## 2018-08-01 PROCEDURE — 93970 EXTREMITY STUDY: CPT | Mod: 26

## 2018-08-01 PROCEDURE — 93306 TTE W/DOPPLER COMPLETE: CPT | Mod: 26

## 2018-08-01 PROCEDURE — 74177 CT ABD & PELVIS W/CONTRAST: CPT | Mod: 26

## 2018-08-01 PROCEDURE — 99291 CRITICAL CARE FIRST HOUR: CPT

## 2018-08-01 RX ORDER — ACETAMINOPHEN 500 MG
650 TABLET ORAL EVERY 6 HOURS
Qty: 0 | Refills: 0 | Status: DISCONTINUED | OUTPATIENT
Start: 2018-08-01 | End: 2018-08-08

## 2018-08-01 RX ORDER — HEPARIN SODIUM 5000 [USP'U]/ML
9500 INJECTION INTRAVENOUS; SUBCUTANEOUS ONCE
Qty: 0 | Refills: 0 | Status: COMPLETED | OUTPATIENT
Start: 2018-08-01 | End: 2018-08-01

## 2018-08-01 RX ORDER — ASPIRIN/CALCIUM CARB/MAGNESIUM 324 MG
81 TABLET ORAL DAILY
Qty: 0 | Refills: 0 | Status: DISCONTINUED | OUTPATIENT
Start: 2018-08-01 | End: 2018-08-08

## 2018-08-01 RX ORDER — PREGABALIN 225 MG/1
1 CAPSULE ORAL
Qty: 0 | Refills: 0 | COMMUNITY

## 2018-08-01 RX ORDER — LEVOTHYROXINE SODIUM 125 MCG
1 TABLET ORAL
Qty: 0 | Refills: 0 | COMMUNITY

## 2018-08-01 RX ORDER — MORPHINE SULFATE 50 MG/1
4 CAPSULE, EXTENDED RELEASE ORAL ONCE
Qty: 0 | Refills: 0 | Status: DISCONTINUED | OUTPATIENT
Start: 2018-08-01 | End: 2018-08-01

## 2018-08-01 RX ORDER — HEPARIN SODIUM 5000 [USP'U]/ML
9500 INJECTION INTRAVENOUS; SUBCUTANEOUS EVERY 6 HOURS
Qty: 0 | Refills: 0 | Status: DISCONTINUED | OUTPATIENT
Start: 2018-08-01 | End: 2018-08-08

## 2018-08-01 RX ORDER — GLUCAGON INJECTION, SOLUTION 0.5 MG/.1ML
1 INJECTION, SOLUTION SUBCUTANEOUS ONCE
Qty: 0 | Refills: 0 | Status: DISCONTINUED | OUTPATIENT
Start: 2018-08-01 | End: 2018-08-08

## 2018-08-01 RX ORDER — DEXTROSE 50 % IN WATER 50 %
25 SYRINGE (ML) INTRAVENOUS ONCE
Qty: 0 | Refills: 0 | Status: DISCONTINUED | OUTPATIENT
Start: 2018-08-01 | End: 2018-08-08

## 2018-08-01 RX ORDER — LEVOTHYROXINE SODIUM 125 MCG
88 TABLET ORAL DAILY
Qty: 0 | Refills: 0 | Status: DISCONTINUED | OUTPATIENT
Start: 2018-08-01 | End: 2018-08-08

## 2018-08-01 RX ORDER — HEPARIN SODIUM 5000 [USP'U]/ML
INJECTION INTRAVENOUS; SUBCUTANEOUS
Qty: 25000 | Refills: 0 | Status: DISCONTINUED | OUTPATIENT
Start: 2018-08-01 | End: 2018-08-08

## 2018-08-01 RX ORDER — CHOLECALCIFEROL (VITAMIN D3) 125 MCG
2 CAPSULE ORAL
Qty: 0 | Refills: 0 | COMMUNITY

## 2018-08-01 RX ORDER — ONDANSETRON 8 MG/1
4 TABLET, FILM COATED ORAL EVERY 6 HOURS
Qty: 0 | Refills: 0 | Status: DISCONTINUED | OUTPATIENT
Start: 2018-08-01 | End: 2018-08-08

## 2018-08-01 RX ORDER — INSULIN GLARGINE 100 [IU]/ML
20 INJECTION, SOLUTION SUBCUTANEOUS AT BEDTIME
Qty: 0 | Refills: 0 | Status: DISCONTINUED | OUTPATIENT
Start: 2018-08-01 | End: 2018-08-08

## 2018-08-01 RX ORDER — METOPROLOL TARTRATE 50 MG
1 TABLET ORAL
Qty: 0 | Refills: 0 | COMMUNITY

## 2018-08-01 RX ORDER — INSULIN LISPRO 100/ML
VIAL (ML) SUBCUTANEOUS
Qty: 0 | Refills: 0 | Status: DISCONTINUED | OUTPATIENT
Start: 2018-08-01 | End: 2018-08-08

## 2018-08-01 RX ORDER — LOSARTAN POTASSIUM 100 MG/1
100 TABLET, FILM COATED ORAL DAILY
Qty: 0 | Refills: 0 | Status: DISCONTINUED | OUTPATIENT
Start: 2018-08-01 | End: 2018-08-03

## 2018-08-01 RX ORDER — ASPIRIN/CALCIUM CARB/MAGNESIUM 324 MG
1 TABLET ORAL
Qty: 0 | Refills: 0 | COMMUNITY

## 2018-08-01 RX ORDER — MULTIVIT-MIN/FERROUS GLUCONATE 9 MG/15 ML
1 LIQUID (ML) ORAL
Qty: 0 | Refills: 0 | COMMUNITY

## 2018-08-01 RX ORDER — LOSARTAN POTASSIUM 100 MG/1
1 TABLET, FILM COATED ORAL
Qty: 0 | Refills: 0 | COMMUNITY

## 2018-08-01 RX ORDER — DEXTROSE 50 % IN WATER 50 %
12.5 SYRINGE (ML) INTRAVENOUS ONCE
Qty: 0 | Refills: 0 | Status: DISCONTINUED | OUTPATIENT
Start: 2018-08-01 | End: 2018-08-08

## 2018-08-01 RX ORDER — PREGABALIN 225 MG/1
1000 CAPSULE ORAL DAILY
Qty: 0 | Refills: 0 | Status: DISCONTINUED | OUTPATIENT
Start: 2018-08-01 | End: 2018-08-08

## 2018-08-01 RX ORDER — METOPROLOL TARTRATE 50 MG
50 TABLET ORAL EVERY 12 HOURS
Qty: 0 | Refills: 0 | Status: DISCONTINUED | OUTPATIENT
Start: 2018-08-01 | End: 2018-08-02

## 2018-08-01 RX ORDER — SODIUM CHLORIDE 9 MG/ML
1000 INJECTION, SOLUTION INTRAVENOUS
Qty: 0 | Refills: 0 | Status: DISCONTINUED | OUTPATIENT
Start: 2018-08-01 | End: 2018-08-08

## 2018-08-01 RX ORDER — HYDROCHLOROTHIAZIDE 25 MG
12.5 TABLET ORAL DAILY
Qty: 0 | Refills: 0 | Status: DISCONTINUED | OUTPATIENT
Start: 2018-08-01 | End: 2018-08-02

## 2018-08-01 RX ORDER — INSULIN LISPRO 100/ML
VIAL (ML) SUBCUTANEOUS AT BEDTIME
Qty: 0 | Refills: 0 | Status: DISCONTINUED | OUTPATIENT
Start: 2018-08-01 | End: 2018-08-08

## 2018-08-01 RX ORDER — MULTIVIT-MIN/FERROUS GLUCONATE 9 MG/15 ML
1 LIQUID (ML) ORAL DAILY
Qty: 0 | Refills: 0 | Status: DISCONTINUED | OUTPATIENT
Start: 2018-08-01 | End: 2018-08-08

## 2018-08-01 RX ORDER — CHOLECALCIFEROL (VITAMIN D3) 125 MCG
2000 CAPSULE ORAL DAILY
Qty: 0 | Refills: 0 | Status: DISCONTINUED | OUTPATIENT
Start: 2018-08-01 | End: 2018-08-08

## 2018-08-01 RX ORDER — HEPARIN SODIUM 5000 [USP'U]/ML
4500 INJECTION INTRAVENOUS; SUBCUTANEOUS EVERY 6 HOURS
Qty: 0 | Refills: 0 | Status: DISCONTINUED | OUTPATIENT
Start: 2018-08-01 | End: 2018-08-08

## 2018-08-01 RX ORDER — WARFARIN SODIUM 2.5 MG/1
10 TABLET ORAL ONCE
Qty: 0 | Refills: 0 | Status: COMPLETED | OUTPATIENT
Start: 2018-08-01 | End: 2018-08-01

## 2018-08-01 RX ORDER — WARFARIN SODIUM 2.5 MG/1
5 TABLET ORAL DAILY
Qty: 0 | Refills: 0 | Status: DISCONTINUED | OUTPATIENT
Start: 2018-08-02 | End: 2018-08-03

## 2018-08-01 RX ORDER — DEXTROSE 50 % IN WATER 50 %
15 SYRINGE (ML) INTRAVENOUS ONCE
Qty: 0 | Refills: 0 | Status: DISCONTINUED | OUTPATIENT
Start: 2018-08-01 | End: 2018-08-08

## 2018-08-01 RX ORDER — GLIMEPIRIDE 1 MG
2 TABLET ORAL
Qty: 0 | Refills: 0 | COMMUNITY

## 2018-08-01 RX ADMIN — Medication 2000 UNIT(S): at 12:17

## 2018-08-01 RX ADMIN — SODIUM CHLORIDE 1000 MILLILITER(S): 9 INJECTION INTRAMUSCULAR; INTRAVENOUS; SUBCUTANEOUS at 02:16

## 2018-08-01 RX ADMIN — MORPHINE SULFATE 4 MILLIGRAM(S): 50 CAPSULE, EXTENDED RELEASE ORAL at 05:23

## 2018-08-01 RX ADMIN — Medication 81 MILLIGRAM(S): at 12:18

## 2018-08-01 RX ADMIN — WARFARIN SODIUM 10 MILLIGRAM(S): 2.5 TABLET ORAL at 22:07

## 2018-08-01 RX ADMIN — HEPARIN SODIUM 5000 UNIT(S): 5000 INJECTION INTRAVENOUS; SUBCUTANEOUS at 02:45

## 2018-08-01 RX ADMIN — Medication 650 MILLIGRAM(S): at 20:22

## 2018-08-01 RX ADMIN — LOSARTAN POTASSIUM 100 MILLIGRAM(S): 100 TABLET, FILM COATED ORAL at 12:18

## 2018-08-01 RX ADMIN — Medication 2: at 12:20

## 2018-08-01 RX ADMIN — HEPARIN SODIUM 0 UNIT(S)/HR: 5000 INJECTION INTRAVENOUS; SUBCUTANEOUS at 19:16

## 2018-08-01 RX ADMIN — ONDANSETRON 4 MILLIGRAM(S): 8 TABLET, FILM COATED ORAL at 10:56

## 2018-08-01 RX ADMIN — MORPHINE SULFATE 4 MILLIGRAM(S): 50 CAPSULE, EXTENDED RELEASE ORAL at 06:27

## 2018-08-01 RX ADMIN — MORPHINE SULFATE 4 MILLIGRAM(S): 50 CAPSULE, EXTENDED RELEASE ORAL at 02:16

## 2018-08-01 RX ADMIN — HEPARIN SODIUM 2100 UNIT(S)/HR: 5000 INJECTION INTRAVENOUS; SUBCUTANEOUS at 02:46

## 2018-08-01 RX ADMIN — HEPARIN SODIUM 1700 UNIT(S)/HR: 5000 INJECTION INTRAVENOUS; SUBCUTANEOUS at 12:07

## 2018-08-01 RX ADMIN — Medication 2: at 17:35

## 2018-08-01 RX ADMIN — Medication 88 MICROGRAM(S): at 12:17

## 2018-08-01 RX ADMIN — Medication 50 MILLIGRAM(S): at 17:30

## 2018-08-01 RX ADMIN — Medication 12.5 MILLIGRAM(S): at 12:17

## 2018-08-01 RX ADMIN — INSULIN GLARGINE 20 UNIT(S): 100 INJECTION, SOLUTION SUBCUTANEOUS at 22:06

## 2018-08-01 RX ADMIN — Medication 1 TABLET(S): at 12:17

## 2018-08-01 RX ADMIN — PREGABALIN 1000 MICROGRAM(S): 225 CAPSULE ORAL at 17:30

## 2018-08-01 RX ADMIN — HEPARIN SODIUM 1300 UNIT(S)/HR: 5000 INJECTION INTRAVENOUS; SUBCUTANEOUS at 20:21

## 2018-08-01 RX ADMIN — HEPARIN SODIUM 0 UNIT(S)/HR: 5000 INJECTION INTRAVENOUS; SUBCUTANEOUS at 10:52

## 2018-08-01 NOTE — H&P ADULT - NSHPPHYSICALEXAM_GEN_ALL_CORE
PHYSICAL EXAM:    Daily Height in cm: 160.02 (31 Jul 2018 22:12)    Daily     ICU Vital Signs Last 24 Hrs  T(C): 36.5 (01 Aug 2018 07:05), Max: 37.1 (31 Jul 2018 22:12)  T(F): 97.7 (01 Aug 2018 07:05), Max: 98.7 (31 Jul 2018 22:12)  HR: 63 (01 Aug 2018 07:05) (63 - 77)  BP: 117/50 (01 Aug 2018 07:05) (117/50 - 166/85)  BP(mean): --  ABP: --  ABP(mean): --  RR: 16 (01 Aug 2018 07:05) (16 - 19)  SpO2: 100% (01 Aug 2018 07:05) (95% - 100%)      Constitutional: Weak appearing, voice is still altered  HEENT: Atraumatic, NORA, Normal, No congestion  Respiratory: Breath Sounds normal, no rhonchi/wheeze  Cardiovascular: N S1S2  Gastrointestinal: Abdomen soft, non tender, Bowel Sounds present  Extremities: No edema, peripheral pulses present  Neurological: AAO x 3, no gross focal motor deficits  Skin: Non cellulitic, no rash, ulcers  Musculoskeletal: non tender  Breasts: Deferred  Genitourinary: deferred  Rectal: Deferred

## 2018-08-01 NOTE — SWALLOW BEDSIDE ASSESSMENT ADULT - SWALLOW EVAL: RECOMMENDED DIET
SUGGEST A REGULAR TEXTURE DIET WITH THIN LIQUID CONSISTENCIES AS TOLERATED, THE PATIENT APPEARED CLINICALLY TOLERANT OF THESE FOOD TEXTURES FROM AN OROPHARYNGEAL SWALLOWING PERSPECTIVE ON CLINICAL EXAM.

## 2018-08-01 NOTE — SWALLOW BEDSIDE ASSESSMENT ADULT - NS SPL SWALLOW CLINIC TRIAL FT
Oropharyngeal Swallowing abilities subjectively appear to be within functional limits for age, NO behavioral aspiration signs exhibited. She denied Odynophagia or Dyspepsia.

## 2018-08-01 NOTE — PATIENT PROFILE ADULT. - VISION (WITH CORRECTIVE LENSES IF THE PATIENT USUALLY WEARS THEM):
Normal vision: sees adequately in most situations; can see medication labels, newsprint/hx of cataract surgery

## 2018-08-01 NOTE — PROGRESS NOTE ADULT - ASSESSMENT
Patient is seen and consult dictated     - pulmonary embolism with segmental emboli in the right lower lobe branches.  - dvt of the right leg   - obesity   - suggestion of sleep apnea   - likely risk factor is recent immobility with  urological procedures .    PLAN     - continue with the heparin and coumadin   - over lap of 5 days until the INR is therapeutic.  - follow up echo report   - monitor hb closely .  - 02 supplementation for the hypoxia   - patient would benifit from the out pat sleep studies   - monitor for the bleeding

## 2018-08-01 NOTE — SWALLOW BEDSIDE ASSESSMENT ADULT - SLP GENERAL OBSERVATIONS
On encounter, pt was minimally SOB but not in overt respiratory distress, An O2 cannula was in place. The patient was alert and interactive. She was able to verbalize during communicative probes and in conversation. At these times, her utterances were intelligible, fluent, linguistically intact and contextually appropriate.  No dysarthria, verbal apraxia or aphasia were evident on exam. Of note is her vocal quality and intensity were felt to be functional despite recent history of laryngeal trauma associated with past intubation.  Note that pt denied aspiration signs or odynophagia when asked. On encounter, pt was minimally SOB but not in overt respiratory distress, An O2 cannula was in place. The patient was alert and interactive. She was able to verbalize during communicative probes and in conversation. At these times, her utterances were intelligible, fluent, linguistically intact and contextually appropriate.  No dysarthria, verbal apraxia or aphasia were evident on exam. Of note is her vocal quality and intensity were felt to be functional despite recent history of laryngeal trauma associated with past intubation.  Further note that pt denied aspiration signs or odynophagia when asked.

## 2018-08-01 NOTE — H&P ADULT - HISTORY OF PRESENT ILLNESS
78 / F with PMHx HTN, DM2, hypothyroidism, chronic back pain, urolithiasis 9mm s/p stent then lithotripsy then  s/p stent s/p removal Dr. Alvarez, c/b laryngeal inflammation with altered voice,  came to ED for c/o  sudden onset right lower chest pain/RUQ pain associated with inability to take a deep breath. It started last night. Pain was severe and was not relieved with hydrocodone. Also, associated with SOB.  No fevers, chills, sweats, weight loss, fatigue, or malaise.  Pt has been mostly sedentary in the last one month or so as she had 3 surgeries back to back.  Denies any leg pain.  CTA abdo/chest in ED showed PE with right heart strain.   Pt feeling better at this time with less sob but pleuritic chest pain on right side present. She is on 2 L of O2 via nc.  she was seen by ICU service also.

## 2018-08-01 NOTE — SWALLOW BEDSIDE ASSESSMENT ADULT - SWALLOW EVAL: DIAGNOSIS
1) The patient demonstrates Oropharyngeal Swallowing abilities which subjectively appear to be stable/within functional parameters for age. NO behavioral aspiration signs exhibited on exam. Odynophagia was denied.   2) The patient was alert and interactive. She was able to verbalize during communicative probes and in conversation. At these times, her utterances were intelligible, fluent, linguistically intact and contextually appropriate.  No dysarthria, verbal apraxia or aphasia were evident on exam. Of note is her vocal quality and intensity were felt to be functional despite recent history of laryngeal trauma associated with past intubation.

## 2018-08-01 NOTE — CONSULT NOTE ADULT - CARDIOVASCULAR
"Ilia Anrold is a 40 year old male who calls with symptom of pain/discomort near his kidneys that can radiate down leg causing right leg numbness.    NURSING ASSESSMENT:  Description:  C/o intermittent kidney pain/discomfort. Rates pain 2/10, though can be worse before bed. He describes this discomfort like \"Pins/needles\" on his skin. It can start dull and worsen to the feeling of \"pins and needles\" on skin around flank area. He does note occassional right leg numbness.  Onset/duration:  Started 3 weeks ago with no improvement  Precip. factors:  None  Associated symptoms: Right Leg numbness   Improves/worsens symptoms:  nothing  Pain scale (0-10)   2/10  Last exam/Treatment:  Has not been seen for this concern. Last nephrology visit was 2013. He was diagnosed with FSGS and CKD 3. He started Lisinopril and was then lost to follow up due to moving to [place]. He did not follow with nephrology while living in [place]. He was seen for a gout flare in June 2017 and was prescribed Prednisone dose pack. Kidney function was stable at that time.  Allergies: No Known Allergies     Ilia denies any history of kidney stones. He has been hydrating well with water. He recently started the Lisinopril back after being off of it. He does have nephrology follow up scheduled for October 2017.     MEDICATIONS:   Taking medication(s) as prescribed? Yes  Taking over the counter medication(s?) No  Any medication side effects? No significant side effects    Any barriers to taking medication(s) as prescribed?  No  Medication(s) improving/managing symptoms?  N/A  Medication reconciliation completed: Yes      NURSING PLAN: Nursing advice to patient see primary care provider within 24 hours. - to obtain labs and imaging if appropriate upon assessment.     RECOMMENDED DISPOSITION:  See in 24 hours - PCP  Will comply with recommendation: Yes  If further questions/concerns or if symptoms do not improve, worsen or new symptoms develop, call your PCP or " [facility] as soon as possible. Asked patient to call back if PCP deems that sooner neph follow up is indicated based upon data collected and/or assessment. Ilia verbalized understanding.      Manuela Tamez RN     detailed exam details…

## 2018-08-01 NOTE — H&P ADULT - NSHPLABSRESULTS_GEN_ALL_CORE
12.3   9.12  )-----------( 213      ( 31 Jul 2018 22:55 )             36.8       CBC Full  -  ( 31 Jul 2018 22:55 )  WBC Count : 9.12 K/uL  Hemoglobin : 12.3 g/dL  Hematocrit : 36.8 %  Platelet Count - Automated : 213 K/uL  Mean Cell Volume : 87.4 fl  Mean Cell Hemoglobin : 29.2 pg  Mean Cell Hemoglobin Concentration : 33.4 gm/dL  Auto Neutrophil # : 6.03 K/uL  Auto Lymphocyte # : 1.98 K/uL  Auto Monocyte # : 0.89 K/uL  Auto Eosinophil # : 0.17 K/uL  Auto Basophil # : 0.03 K/uL  Auto Neutrophil % : 66.1 %  Auto Lymphocyte % : 21.7 %  Auto Monocyte % : 9.8 %  Auto Eosinophil % : 1.9 %  Auto Basophil % : 0.3 %      07-31    141  |  105  |  16  ----------------------------<  132<H>  3.8   |  29  |  1.29    Ca    9.0      31 Jul 2018 22:55    TPro  6.9  /  Alb  3.5  /  TBili  0.4  /  DBili  x   /  AST  13<L>  /  ALT  22  /  AlkPhos  98  07-31      LIVER FUNCTIONS - ( 31 Jul 2018 22:55 )  Alb: 3.5 g/dL / Pro: 6.9 gm/dL / ALK PHOS: 98 U/L / ALT: 22 U/L / AST: 13 U/L / GGT: x             PT/INR - ( 31 Jul 2018 22:55 )   PT: 11.3 sec;   INR: 1.05 ratio         PTT - ( 31 Jul 2018 22:55 )  PTT:33.2 sec    CARDIAC MARKERS ( 31 Jul 2018 22:55 )  <0.015 ng/mL / x     / x     / x     / x            < from: CT Abdomen and Pelvis w/ IV Cont (08.01.18 @ 01:06) >    Right-sided segmental pulmonary emboli. Suggestion of bowing of the   interventricular septum to the left suggesting right heart strain.   Correlate with echo.      < end of copied text >            MEDICATIONS  (STANDING):  aspirin enteric coated 81 milliGRAM(s) Oral daily  cholecalciferol 2000 Unit(s) Oral daily  cyanocobalamin 1000 MICROGram(s) Oral daily  dextrose 5%. 1000 milliLiter(s) (50 mL/Hr) IV Continuous <Continuous>  dextrose 50% Injectable 12.5 Gram(s) IV Push once  dextrose 50% Injectable 25 Gram(s) IV Push once  dextrose 50% Injectable 25 Gram(s) IV Push once  heparin  Infusion.  Unit(s)/Hr (21 mL/Hr) IV Continuous <Continuous>  hydrochlorothiazide 12.5 milliGRAM(s) Oral daily  insulin glargine Injectable (LANTUS) 20 Unit(s) SubCutaneous at bedtime  insulin lispro (HumaLOG) corrective regimen sliding scale   SubCutaneous three times a day before meals  insulin lispro (HumaLOG) corrective regimen sliding scale   SubCutaneous at bedtime  levothyroxine 88 MICROGram(s) Oral daily  losartan 100 milliGRAM(s) Oral daily  metoprolol tartrate 50 milliGRAM(s) Oral every 12 hours  multivitamin/minerals 1 Tablet(s) Oral daily  warfarin 10 milliGRAM(s) Oral once

## 2018-08-01 NOTE — SWALLOW BEDSIDE ASSESSMENT ADULT - SWALLOW EVAL: CRITERIA FOR SKILLED INTERVENTION MET
ACUTE SPEECH PATHOLOGY INTERVENTION IS NOT CLINICALLY WARRANTED AND WOULD NOT . NOTE THAT HER SPEECH-LANGUAGE AND OROPHARYNGEAL SWALLOWING ABILITIES APPEAR TO BE WITHIN FUNCTIONAL LIMITS. pT IS ABLE TO COMPETENTLY VERBALIZE HER INTENTS AND HER SWALLOW STATUS IS FELT TO BE STABLE. GIVEN ABOVE, WILL NOT ACTIVELY FOLLOW. RECONSULT PRN SHOULD STATUS CHANGE AND CONDITION WARRANT. ACUTE SPEECH PATHOLOGY INTERVENTION IS NOT CLINICALLY WARRANTED AND WOULD NOT . NOTE THAT HER SPEECH-LANGUAGE AND OROPHARYNGEAL SWALLOWING ABILITIES APPEAR TO BE WITHIN FUNCTIONAL LIMITS. PT IS ABLE TO COMPETENTLY VERBALIZE HER INTENTS AND HER SWALLOW STATUS IS FELT TO BE STABLE. GIVEN ABOVE, WILL NOT ACTIVELY FOLLOW. RECONSULT PRN SHOULD STATUS CHANGE AND CONDITION WARRANT.

## 2018-08-01 NOTE — SWALLOW BEDSIDE ASSESSMENT ADULT - COMMENTS
The patient was admitted to  with chest pain and was found to be hypoxic, Imaging notable for a a PE in right lung. This profile is superimposed upon a history of patient recently undergoing lithotripsy with ureteral stent placement/removal. She was intubated for the procedure and reportedly sustained laryngeal hematomas/edema associated with intubation. She is currently being monitored as an outpatient by Dr Benton(ENT). The pt stated that her laryngeal hematomas/edema have been steadily improving. This profile is superimposed upon a history of a renal cyst, DM type 2, HTN, HLD, obesity, hypothyroidism, Vitamin D deficiency, spinal stenosis with chronic back pain, previous choley, prior sx due to breast abscess, .past left shoulder placement, and prior Polio. The patient was admitted to  with chest pain and was found to be hypoxic, Imaging notable for a a PE in right lung. This profile is superimposed upon a history of patient recently undergoing lithotripsy with ureteral stent placement/removal. She was intubated for the procedure and reportedly sustained laryngeal hematomas/edema associated with intubation. She is currently being monitored as an outpatient by Dr Benton(ENT). The pt stated that her laryngeal hematomas/edema have been steadily improving. This profile is superimposed upon a history of a renal cyst, DM type 2, HTN, HLD, obesity, hypothyroidism, Vitamin D deficiency, spinal stenosis with chronic back pain, previous choley, prior sx due to breast abscess, .past left shoulder placement, and prior Polio. See below for additional prior medical information. The patient was admitted to  with chest pain and was found to be hypoxic, Imaging notable for a a PE in right lung. This profile is superimposed upon a history of patient recently undergoing lithotripsy with ureteral stent placement/removal. She was intubated for the procedure and reportedly sustained laryngeal hematomas/edema associated with intubation. She is currently being monitored as an outpatient by Dr Benton(ENT). The pt stated that her laryngeal hematomas/edema have been steadily improving. This profile is superimposed upon a history of a renal cyst, DM type 2, HTN, HLD, obesity, hypothyroidism, Vitamin D deficiency, spinal stenosis with chronic back pain, previous choley, prior sx due to breast abscess, past left shoulder replacement, and prior Polio. See below for additional prior medical information.

## 2018-08-01 NOTE — H&P ADULT - ASSESSMENT
78 / F with PMHx HTN, DM2, hypothyroidism, chronic back pain, urolithiasis 9mm s/p stent then lithotripsy then  s/p stent s/p removal Dr. Alvarez, c/b laryngeal inflammation with altered voice, admitted with     1) Right sided Segmental Pulmonary Embolism with Right Heart strain with acute hypoxic respiratory failure: Provoked PE from being sedentary in the last 5 weeks.   admit to tele  cont supportive O2  cont heparin drip  pt and her daughter want to go with coumadin; so give 10 mg tonight and then 5 mg daily, titrate dose with response to INR. Daily INR  Echo  cardio/pulmo consults  Leg venous dopplers to r/o DVT  INR today and then daily  BMP today and tomorrow to f/u renal function ( got iv contrast)     2) HTN:  cont losartan, metoprolol, HCTZ    3) Hypothyroidism: cont synthroid    4) DM 2: Hold oral hypoglycemics  lantus + ISS    5) Altered voice from intubation during ureteral stenting:  Speech therapist consult    6) DVT PPX:  heparin drip, coumadin    poc discussed at length with pt, her daughter, Justine, at bedside, team and they are in agreement with the above plan of care.

## 2018-08-01 NOTE — CONSULT NOTE ADULT - SUBJECTIVE AND OBJECTIVE BOX
pt seen at 02:20am in ED and discussed with Dr Wilhelm.    77 yo F who was in her usual state of health and abruptly felt onset of RIGHT pleuritic CP with deep inspiration at approx 9pm on 7/31.  Came to ED where eval demonstrated RIGHT subsegmental pulmonary emboli on CTA.  No elevation in troponin, No elevation in pBNP.  Pt Sat was 95% on RA.  She is hemodynamically stable and in NAD/NRD.  Speaking full sentences without difficulty.    Issues and plans d/w pt and her daughter at bedside and all questions answered.    Notably pt underwent ureteral stenting and subsequent lithotripsy in the last month.  She reports that she received heparin during her hospitalization.  No prior hx of blood clots.        PAST MEDICAL & SURGICAL HISTORY:  Hypovitaminosis D  Renal cyst  Hypercholesterolemia  Spinal stenosis  Obesity  Poliomyelitis  Arytenoid finding: left arytenoid and vocal fold hematoma secondary to difficult intubation  Chronic back pain  Hypothyroidism  DM (diabetes mellitus)  Hypertension  History of tonsillectomy  Bartholin cyst  Ectopic pregnancy  Abscess of breast: drainage of abscess left breast  S/P shoulder replacement, left: partial  Encounter for removal of ureteral stent  S/P cholecystectomy      FAMILY HISTORY:  Family history of hypertension (Father, Mother)  Family history of diabetes mellitus (Sibling, Grandparent): Brother and grandmother      Social Hx: nonsmoker, occ ETOH, lives alone in her own home.    Allergies  Ammoniated Mercury (Rash)  Keflex (Pruritus)  penicillin G potassium (Pruritus; Rash)  Victoza (Other)    Height (cm): 160.02 (07-31 @ 22:12)  Weight (kg): 115.5 (08-01 @ 02:06)  BMI (kg/m2): 45.1 (08-01 @ 02:06)    ICU Vital Signs Last 24 Hrs  T(C): 37.1 (31 Jul 2018 22:12), Max: 37.1 (31 Jul 2018 22:12)  T(F): 98.7 (31 Jul 2018 22:12), Max: 98.7 (31 Jul 2018 22:12)  HR: 72 (31 Jul 2018 22:12) (72 - 72)  BP: 166/85 (31 Jul 2018 22:12) (166/85 - 166/85)  BP(mean): --  ABP: --  ABP(mean): --  RR: 18 (31 Jul 2018 22:12) (18 - 18)  SpO2: 95% (31 Jul 2018 22:12) (95% - 95%)                          12.3   9.12  )-----------( 213      ( 31 Jul 2018 22:55 )             36.8       07-31    141  |  105  |  16  ----------------------------<  132<H>  3.8   |  29  |  1.29    Ca    9.0      31 Jul 2018 22:55    TPro  6.9  /  Alb  3.5  /  TBili  0.4  /  DBili  x   /  AST  13<L>  /  ALT  22  /  AlkPhos  98  07-31      CAPILLARY BLOOD GLUCOSE          LIVER FUNCTIONS - ( 31 Jul 2018 22:55 )  Alb: 3.5 g/dL / Pro: 6.9 gm/dL / ALK PHOS: 98 U/L / ALT: 22 U/L / AST: 13 U/L / GGT: x             CARDIAC MARKERS ( 31 Jul 2018 22:55 )  <0.015 ng/mL / x     / x     / x     / x        pBNP:  245    PT/INR - ( 31 Jul 2018 22:55 )   PT: 11.3 sec;   INR: 1.05 ratio         PTT - ( 31 Jul 2018 22:55 )  PTT:33.2 sec      MEDICATIONS  (STANDING):  heparin  Infusion.  Unit(s)/Hr (21 mL/Hr) IV Continuous <Continuous>    MEDICATIONS  (PRN):  heparin  Injectable 9500 Unit(s) IV Push every 6 hours PRN For aPTT less than 40  heparin  Injectable 4500 Unit(s) IV Push every 6 hours PRN For aPTT between 40 - 57          DVT Prophylaxis: Heparin infusion.    Visit Information: Consult Melonie (ED MD)    ** Time is exclusive of billed procedures and/or teaching and/or routine family updates.

## 2018-08-01 NOTE — SWALLOW BEDSIDE ASSESSMENT ADULT - ADDITIONAL RECOMMENDATIONS
1) Nutrition follow up. Note that pt appears overweight and also has a underlying history of DM type 2, HTN, and high cholesterol.     2) Pt encouraged to continue her outpatient ENT follow up with Dr. Benton to monitor improvement associated with past laryngeal trauma assocated with intubation. 1) Nutrition follow up. Note that pt appears overweight and also has a underlying history of DM type 2, HTN, and high cholesterol.     2) Pt encouraged to continue her outpatient ENT follow up with Dr. Benton to monitor improvement associated with past laryngeal trauma assocated with past intubation.

## 2018-08-01 NOTE — ED ADULT NURSE REASSESSMENT NOTE - NS ED NURSE REASSESS COMMENT FT1
patient resting in bed comfortably on monitor. sleeping. arousable to voice. alert and oriented x 4, no respiratory distress. heparin infusing. daughter at bedside. call bell within reach. admitted; awaiting admission orders. will continue to monitor.
Daughter at bedside aware of wait for bed in hospital. Patient appears comfortable, sleeping. No complaints at this time. Comfort and safety measures remain in effect.

## 2018-08-02 LAB
ANION GAP SERPL CALC-SCNC: 3 MMOL/L — LOW (ref 5–17)
APTT BLD: 65.4 SEC — HIGH (ref 27.5–37.4)
APTT BLD: 95.2 SEC — HIGH (ref 27.5–37.4)
BUN SERPL-MCNC: 16 MG/DL — SIGNIFICANT CHANGE UP (ref 7–23)
CALCIUM SERPL-MCNC: 8.3 MG/DL — LOW (ref 8.5–10.1)
CHLORIDE SERPL-SCNC: 106 MMOL/L — SIGNIFICANT CHANGE UP (ref 96–108)
CO2 SERPL-SCNC: 29 MMOL/L — SIGNIFICANT CHANGE UP (ref 22–31)
CREAT SERPL-MCNC: 1.09 MG/DL — SIGNIFICANT CHANGE UP (ref 0.5–1.3)
GLUCOSE BLDC GLUCOMTR-MCNC: 129 MG/DL — HIGH (ref 70–99)
GLUCOSE BLDC GLUCOMTR-MCNC: 141 MG/DL — HIGH (ref 70–99)
GLUCOSE BLDC GLUCOMTR-MCNC: 149 MG/DL — HIGH (ref 70–99)
GLUCOSE BLDC GLUCOMTR-MCNC: 155 MG/DL — HIGH (ref 70–99)
GLUCOSE BLDC GLUCOMTR-MCNC: 158 MG/DL — HIGH (ref 70–99)
GLUCOSE SERPL-MCNC: 105 MG/DL — HIGH (ref 70–99)
HCT VFR BLD CALC: 32 % — LOW (ref 34.5–45)
HGB BLD-MCNC: 10.4 G/DL — LOW (ref 11.5–15.5)
INR BLD: 1.11 RATIO — SIGNIFICANT CHANGE UP (ref 0.88–1.16)
MCHC RBC-ENTMCNC: 29.7 PG — SIGNIFICANT CHANGE UP (ref 27–34)
MCHC RBC-ENTMCNC: 32.5 GM/DL — SIGNIFICANT CHANGE UP (ref 32–36)
MCV RBC AUTO: 91.4 FL — SIGNIFICANT CHANGE UP (ref 80–100)
NRBC # BLD: 0 /100 WBCS — SIGNIFICANT CHANGE UP (ref 0–0)
PLATELET # BLD AUTO: 169 K/UL — SIGNIFICANT CHANGE UP (ref 150–400)
POTASSIUM SERPL-MCNC: 3.5 MMOL/L — SIGNIFICANT CHANGE UP (ref 3.5–5.3)
POTASSIUM SERPL-SCNC: 3.5 MMOL/L — SIGNIFICANT CHANGE UP (ref 3.5–5.3)
PROTHROM AB SERPL-ACNC: 12 SEC — SIGNIFICANT CHANGE UP (ref 9.8–12.7)
RBC # BLD: 3.5 M/UL — LOW (ref 3.8–5.2)
RBC # FLD: 13.5 % — SIGNIFICANT CHANGE UP (ref 10.3–14.5)
SODIUM SERPL-SCNC: 138 MMOL/L — SIGNIFICANT CHANGE UP (ref 135–145)
WBC # BLD: 6.52 K/UL — SIGNIFICANT CHANGE UP (ref 3.8–10.5)
WBC # FLD AUTO: 6.52 K/UL — SIGNIFICANT CHANGE UP (ref 3.8–10.5)

## 2018-08-02 PROCEDURE — 93010 ELECTROCARDIOGRAM REPORT: CPT

## 2018-08-02 PROCEDURE — 70450 CT HEAD/BRAIN W/O DYE: CPT | Mod: 26

## 2018-08-02 PROCEDURE — 70486 CT MAXILLOFACIAL W/O DYE: CPT | Mod: 26

## 2018-08-02 PROCEDURE — 99223 1ST HOSP IP/OBS HIGH 75: CPT

## 2018-08-02 RX ORDER — METOPROLOL TARTRATE 50 MG
25 TABLET ORAL
Qty: 0 | Refills: 0 | Status: DISCONTINUED | OUTPATIENT
Start: 2018-08-02 | End: 2018-08-06

## 2018-08-02 RX ADMIN — Medication 25 MILLIGRAM(S): at 18:29

## 2018-08-02 RX ADMIN — Medication 81 MILLIGRAM(S): at 12:56

## 2018-08-02 RX ADMIN — Medication 650 MILLIGRAM(S): at 06:56

## 2018-08-02 RX ADMIN — Medication 2000 UNIT(S): at 12:56

## 2018-08-02 RX ADMIN — INSULIN GLARGINE 20 UNIT(S): 100 INJECTION, SOLUTION SUBCUTANEOUS at 22:16

## 2018-08-02 RX ADMIN — Medication 1 TABLET(S): at 22:18

## 2018-08-02 RX ADMIN — LOSARTAN POTASSIUM 100 MILLIGRAM(S): 100 TABLET, FILM COATED ORAL at 06:56

## 2018-08-02 RX ADMIN — HEPARIN SODIUM 1300 UNIT(S)/HR: 5000 INJECTION INTRAVENOUS; SUBCUTANEOUS at 05:26

## 2018-08-02 RX ADMIN — Medication 88 MICROGRAM(S): at 06:56

## 2018-08-02 RX ADMIN — Medication 1 TABLET(S): at 16:54

## 2018-08-02 RX ADMIN — Medication 650 MILLIGRAM(S): at 07:55

## 2018-08-02 RX ADMIN — Medication 2: at 17:41

## 2018-08-02 RX ADMIN — Medication 1 TABLET(S): at 17:30

## 2018-08-02 RX ADMIN — Medication 1 TABLET(S): at 12:56

## 2018-08-02 RX ADMIN — Medication 50 MILLIGRAM(S): at 06:56

## 2018-08-02 RX ADMIN — WARFARIN SODIUM 5 MILLIGRAM(S): 2.5 TABLET ORAL at 22:17

## 2018-08-02 RX ADMIN — PREGABALIN 1000 MICROGRAM(S): 225 CAPSULE ORAL at 12:56

## 2018-08-02 RX ADMIN — HEPARIN SODIUM 1300 UNIT(S)/HR: 5000 INJECTION INTRAVENOUS; SUBCUTANEOUS at 12:50

## 2018-08-02 NOTE — PROGRESS NOTE ADULT - ASSESSMENT
78 / F with PMHx HTN, DM2, hypothyroidism, chronic back pain, urolithiasis 9mm s/p stent then lithotripsy then  s/p stent s/p removal Dr. Alvarez, c/b laryngeal inflammation with altered voice admitted on 8/1/18 with c/o  sudden onset right lower chest pain/RUQ pain associated with inability to take a deep breath.    * Right sided Segmental Pulmonary Embolism  with acute hypoxic respiratory failure  *  Provoked PE from being sedentary in the last 5 weeks.   * Right sided DVT   - CICU   - cont supportive O2  - cont heparin drip with coumadin , will get AC service consult   - pt and her daughter want to go with coumadin; so give 10 mg tonight and then 5 mg daily, titrate dose with response to INR. Daily INR  - Echo - EF 60% , LVH , mild AS, diastolic dysfunction, dilated RV with preserves function   - cardio/pulmo consults   - Leg venous dopplers - positive Right DVT   - INR daily   - AC consult   - outpatient thrombophilia work-up  and TAMICA evaluation  - check O2 on ambulation    * Headaches r/o acute pathology, suspected sinusitis vs migraine  - CT head   - Ct sinuses  - aleks puentes prn    *  HTN: overcontrolled   - noted low HR in 50th   - cont losartan,  - lower dose of  metoprolol 25 bid  -  hold HCTZ    *  Hypothyroidism: cont synthroid      *  DM 2:   - Hold oral hypoglycemics  - lantus + ISS  - Hemoglobin A1C, 6.7     *  Altered voice from intubation during ureteral stenting:  Speech therapist consult    * Mild anemia  - check iron studies, b12, folate    *  DVT PPX:  heparin drip, coumadin  time spend 40 min

## 2018-08-02 NOTE — CONSULT NOTE ADULT - SUBJECTIVE AND OBJECTIVE BOX
HPI  HPI:  78 / F with PMHx HTN, DM2, hypothyroidism, chronic back pain, urolithiasis 9mm s/p stent then lithotripsy then  s/p stent s/p removal Dr. Alvarez, c/b laryngeal inflammation with altered voice,  came to ED for c/o  sudden onset right lower chest pain/RUQ pain associated with inability to take a deep breath. It started last night. Pain was severe and was not relieved with hydrocodone. Also, associated with SOB.  No fevers, chills, sweats, weight loss, fatigue, or malaise.  Pt has been mostly sedentary in the last one month or so as she had 3 surgeries back to back.  Denies any leg pain.  CTA abdo/chest in ED showed PE with right heart strain.   Pt feeling better at this time with less sob but pleuritic chest pain on right side present. She is on 2 L of O2 via nc.  she was seen by ICU service also. (01 Aug 2018 08:43)      Patient is a 78y old  Female who presents with a chief complaint of SOB, RUQ pain (01 Aug 2018 10:36)      Consulted by Dr. Marina   for VTE prophylaxis, risk stratification, and anticoagulation management.    PAST MEDICAL & SURGICAL HISTORY:  Hypovitaminosis D  Renal cyst  Hypercholesterolemia  Spinal stenosis  Obesity  Poliomyelitis  Arytenoid finding: left arytenoid and vocal fold hematoma secondary to difficult intubation  Chronic back pain  Hypothyroidism  DM (diabetes mellitus)  Hypertension  History of tonsillectomy  Bartholin cyst  Ectopic pregnancy  Abscess of breast: drainage of abscess left breast  S/P shoulder replacement, left: partial  Encounter for removal of ureteral stent  S/P cholecystectomy          CrCl: 63        IMPROVE VTE Risk Score: #5    IMPROVE Bleeding Risk Score #1.5    Falls Risk:   High (x  )  Mod (  )  Low (  )      FAMILY HISTORY:  Family history of hypertension (Father, Mother)  Family history of diabetes mellitus (Sibling, Grandparent): Brother and grandmother    Denies any personal or familial history of clotting or bleeding disorders.    Allergies    Ammoniated Mercury (Rash)  Keflex (Pruritus)  penicillin G potassium (Pruritus; Rash)  Victoza (Other)    Intolerances        REVIEW OF SYSTEMS    (  )Fever	     (  )Constipation	(  )SOB				(  )Headache	(  )Dysuria  (  )Chills	     (  )Melena	(  )Dyspnea present on exertion	                    (  )Dizziness                    (  )Polyuria  (  )Nausea	     (  )Hematochezia	(  )Cough			                    (  )Syncope   	(  )Hematuria  (  )Vomiting    (  )Chest Pain	(  )Wheezing			(  )Weakness  (  )Diarrhea     (  )Palpitations	(  )Anorexia			(  )Myalgia    All other review of systems negative: Yes          PHYSICAL EXAM:    Constitutional: Appears Well    Neurological: A& O x 3    Skin: Warm    Respiratory and Thorax: normal effort; Breath sounds: normal; No rales/wheezing/rhonchi  	  Cardiovascular: S1, S2, regular, NMBR	    Gastrointestinal: BS + x 4Q, nontender	    Genitourinary:  Bladder nondistended, nontender    Musculoskeletal:   General Right:   no muscle/joint tenderness,   normal tone, no joint swelling,   ROM: limited/full	    General Left:   no muscle/joint tenderness,   normal tone, no joint swelling,   ROM: limited/full    Lower extrems:   Right: no calf tenderness              negative kathe's sign               + pedal pulses    Left:   no calf tenderness              negative kathe's sign               + pedal pulses                          10.4   6.52  )-----------( 169      ( 02 Aug 2018 04:34 )             32.0       08-02    138  |  106  |  16  ----------------------------<  105<H>  3.5   |  29  |  1.09    Ca    8.3<L>      02 Aug 2018 04:34    TPro  6.9  /  Alb  3.5  /  TBili  0.4  /  DBili  x   /  AST  13<L>  /  ALT  22  /  AlkPhos  98  07-31      PT/INR - ( 02 Aug 2018 04:34 )   PT: 12.0 sec;   INR: 1.11 ratio         PTT - ( 02 Aug 2018 11:14 )  PTT:65.4 sec				    MEDICATIONS  (STANDING):  aspirin enteric coated 81 milliGRAM(s) Oral daily  cholecalciferol 2000 Unit(s) Oral daily  cyanocobalamin 1000 MICROGram(s) Oral daily  dextrose 5%. 1000 milliLiter(s) IV Continuous <Continuous>  dextrose 50% Injectable 12.5 Gram(s) IV Push once  dextrose 50% Injectable 25 Gram(s) IV Push once  dextrose 50% Injectable 25 Gram(s) IV Push once  heparin  Infusion.  Unit(s)/Hr IV Continuous <Continuous>  insulin glargine Injectable (LANTUS) 20 Unit(s) SubCutaneous at bedtime  insulin lispro (HumaLOG) corrective regimen sliding scale   SubCutaneous three times a day before meals  insulin lispro (HumaLOG) corrective regimen sliding scale   SubCutaneous at bedtime  levothyroxine 88 MICROGram(s) Oral daily  losartan 100 milliGRAM(s) Oral daily  metoprolol tartrate 25 milliGRAM(s) Oral two times a day  multivitamin/minerals 1 Tablet(s) Oral daily  warfarin 5 milliGRAM(s) Oral daily          DVT Prophylaxis:  LMWH                   (  )  Heparin SQ           (  )  Coumadin             (  )  Xarelto                  (  )  Eliquis                   (  )  Venodynes           (  )  Ambulation          (  )  UFH                       (  )  Contraindicated  (  ) HPI  HPI:  78 / F with PMHx HTN, DM2, hypothyroidism, chronic back pain, urolithiasis 9mm s/p stent then lithotripsy then  s/p stent s/p removal Dr. Alvarez, c/b laryngeal inflammation with altered voice,  came to ED for c/o  sudden onset right lower chest pain/RUQ pain associated with inability to take a deep breath. It started last night. Pain was severe and was not relieved with hydrocodone. Also, associated with SOB.  No fevers, chills, sweats, weight loss, fatigue, or malaise.  Pt has been mostly sedentary in the last one month or so as she had 3 surgeries back to back.  Denies any leg pain.  CTA abdo/chest in ED showed PE with right heart strain.   Pt feeling better at this time with less sob but pleuritic chest pain on right side present. She is on 2 L of O2 via nc.  she was seen by ICU service also. (01 Aug 2018 08:43)      Patient is a 78y old  Female who presents with a chief complaint of SOB, RUQ pain (01 Aug 2018 10:36)      Consulted by Dr. Marina   for VTE prophylaxis, risk stratification, and anticoagulation management.    PAST MEDICAL & SURGICAL HISTORY:  Hypovitaminosis D  Renal cyst  Hypercholesterolemia  Spinal stenosis  Obesity  Poliomyelitis  Arytenoid finding: left arytenoid and vocal fold hematoma secondary to difficult intubation  Chronic back pain  Hypothyroidism  DM (diabetes mellitus)  Hypertension  History of tonsillectomy  Bartholin cyst  Ectopic pregnancy  Abscess of breast: drainage of abscess left breast  S/P shoulder replacement, left: partial  Encounter for removal of ureteral stent  S/P cholecystectomy          CrCl: 63        IMPROVE VTE Risk Score: #5    IMPROVE Bleeding Risk Score #1.5    Falls Risk:   High (x  )  Mod (  )  Low (  )      FAMILY HISTORY:  Family history of hypertension (Father, Mother)  Family history of diabetes mellitus (Sibling, Grandparent): Brother and grandmother    Denies any personal or familial history of clotting or bleeding disorders.    Allergies    Ammoniated Mercury (Rash)  Keflex (Pruritus)  penicillin G potassium (Pruritus; Rash)  Victoza (Other)    Intolerances        REVIEW OF SYSTEMS    (  )Fever	     (  )Constipation	(  )SOB				(  )Headache	(  )Dysuria  (  )Chills	     (  )Melena	(  )Dyspnea present on exertion	                    (  )Dizziness                    (  )Polyuria  (  )Nausea	     (  )Hematochezia	(  )Cough			                    (  )Syncope   	(  )Hematuria  (  )Vomiting    (  )Chest Pain	(  )Wheezing			(  )Weakness  (  )Diarrhea     (  )Palpitations	(  )Anorexia			(  )Myalgia    All other review of systems negative: Yes          PHYSICAL EXAM:    Constitutional: Appears Well    Neurological: A& O x 3    Skin: Warm    Respiratory and Thorax: normal effort; Breath sounds: normal; No rales/wheezing/rhonchi  	  Cardiovascular: S1, S2, regular, NMBR	    Gastrointestinal: BS + x 4Q, nontender	    Genitourinary:  Bladder nondistended, nontender    Musculoskeletal:   General Right:   no muscle/joint tenderness,   normal tone, no joint swelling,   ROM: limited/full	    General Left:   no muscle/joint tenderness,   normal tone, no joint swelling,   ROM: limited/full    Lower extrems:   Right: no calf tenderness              negative kathe's sign               + pedal pulses    Left:   no calf tenderness              negative kathe's sign               + pedal pulses                          10.4   6.52  )-----------( 169      ( 02 Aug 2018 04:34 )             32.0       08-02    138  |  106  |  16  ----------------------------<  105<H>  3.5   |  29  |  1.09    Ca    8.3<L>      02 Aug 2018 04:34    TPro  6.9  /  Alb  3.5  /  TBili  0.4  /  DBili  x   /  AST  13<L>  /  ALT  22  /  AlkPhos  98  07-31      PT/INR - ( 02 Aug 2018 04:34 )   PT: 12.0 sec;   INR: 1.11 ratio         PTT - ( 02 Aug 2018 11:14 )  PTT:65.4 sec			        CT head  IMPRESSION:    No acute intracranial hemorrhage, mass effect, or midline shift. No   interval change since head CT of 8/25/15.    Mild chronic bilateral cerebral white matter microvascular changes.        CHEST:     LUNGS, LARGE AIRWAYS, PLEURA: Patent central airways. No consolidation or   pneumothorax.  No pleural effusion. Bibasilar linear atelectasis  VESSELS: Atherosclerosis. There are filling defects along the right   segmental pulmonary arteries consistent with pulmonary emboli. There may   be an additional filling defect along the left lower lobe basal segment.   No saddle embolus. The main pulmonary artery measures 3.1 cm. Suggestion   of slight bowing of the interventricular septum to the left  HEART: Heart size is normal. No pericardial effusion. Coronary   atherosclerosis  MEDIASTINUM AND SHANNAN: No lymphadenopathy.  CHEST WALL AND LOWER NECK: Within normal limits.    ABDOMEN AND PELVIS:    LIVER: Within normal limits.  GALLBLADDER: Cholecystectomy  SPLEEN: Within normal limits.  PANCREAS: Within normal limits.  ADRENALS: Within normal limits.  KIDNEYS/URETERS: Right renal cyst again noted. The previously seen   calculus within the left renal pelvis has passed and there is no longer   left-sided hydronephrosis or perinephric stranding. Left-sided lesions   again noted, too small to characterize. Presumed left parapelvic cysts.   The left kidney is atrophic.    BLADDER: Within normal limits.  REPRODUCTIVE ORGANS: Unremarkable    BOWEL: Limited without oral contrast. No evidence of bowel obstruction.   Normal appendix. Extensive diverticula without evidence of diverticulitis.  PERITONEUM: No ascites.  VESSELS:  Atherosclerosis  RETROPERITONEUM: No lymphadenopathy.    ABDOMINAL WALL: Tiny fat-containing umbilical hernia  BONES: Degenerative changes    IMPRESSION:    Right-sided segmental pulmonary emboli. Suggestion of bowing of the   interventricular septum to the left suggesting right heart strain.   Correlate with echo.    	    Doppler:  FINDINGS:    Right lower extremity: There is thrombus in the right soleal and   posterior tibial veins as well as the distal popliteal vein. There is   normal compressibility of the  common femoral, femoral and popliteal   veins.      Left lower extremity: There is normal compressibility of the  common   femoral, femoral and popliteal veins. No calf vein thrombosis is detected.    There are bilateral popliteal cysts, measuring 4.0 x 1.0 x 2.0 cm on the   right and 5.0 x 2.1 cm on the left.    IMPRESSION:     Right lower extremity below the knee deep vein thrombosis.      MEDICATIONS  (STANDING):  aspirin enteric coated 81 milliGRAM(s) Oral daily  cholecalciferol 2000 Unit(s) Oral daily  cyanocobalamin 1000 MICROGram(s) Oral daily  dextrose 5%. 1000 milliLiter(s) IV Continuous <Continuous>  dextrose 50% Injectable 12.5 Gram(s) IV Push once  dextrose 50% Injectable 25 Gram(s) IV Push once  dextrose 50% Injectable 25 Gram(s) IV Push once  heparin  Infusion.  Unit(s)/Hr IV Continuous <Continuous>  insulin glargine Injectable (LANTUS) 20 Unit(s) SubCutaneous at bedtime  insulin lispro (HumaLOG) corrective regimen sliding scale   SubCutaneous three times a day before meals  insulin lispro (HumaLOG) corrective regimen sliding scale   SubCutaneous at bedtime  levothyroxine 88 MICROGram(s) Oral daily  losartan 100 milliGRAM(s) Oral daily  metoprolol tartrate 25 milliGRAM(s) Oral two times a day  multivitamin/minerals 1 Tablet(s) Oral daily  warfarin 5 milliGRAM(s) Oral daily          DVT Prophylaxis:  LMWH                   (  )  Heparin SQ           (  )  Coumadin             (  x)  Xarelto                  (  )  Eliquis                   (  )  Venodynes           (  )  Ambulation          (  )  UFH                       ( x )  Contraindicated  (  )

## 2018-08-03 LAB
ANION GAP SERPL CALC-SCNC: 6 MMOL/L — SIGNIFICANT CHANGE UP (ref 5–17)
APTT BLD: 82.3 SEC — HIGH (ref 27.5–37.4)
BUN SERPL-MCNC: 30 MG/DL — HIGH (ref 7–23)
CALCIUM SERPL-MCNC: 8.8 MG/DL — SIGNIFICANT CHANGE UP (ref 8.5–10.1)
CHLORIDE SERPL-SCNC: 102 MMOL/L — SIGNIFICANT CHANGE UP (ref 96–108)
CO2 SERPL-SCNC: 28 MMOL/L — SIGNIFICANT CHANGE UP (ref 22–31)
CREAT SERPL-MCNC: 1.46 MG/DL — HIGH (ref 0.5–1.3)
FERRITIN SERPL-MCNC: 88 NG/ML — SIGNIFICANT CHANGE UP (ref 15–150)
FOLATE SERPL-MCNC: 10 NG/ML — SIGNIFICANT CHANGE UP
GLUCOSE BLDC GLUCOMTR-MCNC: 139 MG/DL — HIGH (ref 70–99)
GLUCOSE BLDC GLUCOMTR-MCNC: 163 MG/DL — HIGH (ref 70–99)
GLUCOSE BLDC GLUCOMTR-MCNC: 164 MG/DL — HIGH (ref 70–99)
GLUCOSE BLDC GLUCOMTR-MCNC: 217 MG/DL — HIGH (ref 70–99)
GLUCOSE SERPL-MCNC: 106 MG/DL — HIGH (ref 70–99)
HCT VFR BLD CALC: 36.3 % — SIGNIFICANT CHANGE UP (ref 34.5–45)
HGB BLD-MCNC: 11.9 G/DL — SIGNIFICANT CHANGE UP (ref 11.5–15.5)
INR BLD: 1.17 RATIO — HIGH (ref 0.88–1.16)
IRON SATN MFR SERPL: 26 % — SIGNIFICANT CHANGE UP (ref 14–50)
IRON SATN MFR SERPL: 64 UG/DL — SIGNIFICANT CHANGE UP (ref 30–160)
MCHC RBC-ENTMCNC: 29.5 PG — SIGNIFICANT CHANGE UP (ref 27–34)
MCHC RBC-ENTMCNC: 32.8 GM/DL — SIGNIFICANT CHANGE UP (ref 32–36)
MCV RBC AUTO: 90.1 FL — SIGNIFICANT CHANGE UP (ref 80–100)
NRBC # BLD: 0 /100 WBCS — SIGNIFICANT CHANGE UP (ref 0–0)
PLATELET # BLD AUTO: 155 K/UL — SIGNIFICANT CHANGE UP (ref 150–400)
POTASSIUM SERPL-MCNC: 3.8 MMOL/L — SIGNIFICANT CHANGE UP (ref 3.5–5.3)
POTASSIUM SERPL-SCNC: 3.8 MMOL/L — SIGNIFICANT CHANGE UP (ref 3.5–5.3)
PROTHROM AB SERPL-ACNC: 12.7 SEC — SIGNIFICANT CHANGE UP (ref 9.8–12.7)
RBC # BLD: 4.03 M/UL — SIGNIFICANT CHANGE UP (ref 3.8–5.2)
RBC # FLD: 13.1 % — SIGNIFICANT CHANGE UP (ref 10.3–14.5)
SODIUM SERPL-SCNC: 136 MMOL/L — SIGNIFICANT CHANGE UP (ref 135–145)
TIBC SERPL-MCNC: 243 UG/DL — SIGNIFICANT CHANGE UP (ref 220–430)
TROPONIN I SERPL-MCNC: <0.015 NG/ML — SIGNIFICANT CHANGE UP (ref 0.01–0.04)
UIBC SERPL-MCNC: 179 UG/DL — SIGNIFICANT CHANGE UP (ref 110–370)
VIT B12 SERPL-MCNC: 1665 PG/ML — HIGH (ref 232–1245)
WBC # BLD: 5.56 K/UL — SIGNIFICANT CHANGE UP (ref 3.8–10.5)
WBC # FLD AUTO: 5.56 K/UL — SIGNIFICANT CHANGE UP (ref 3.8–10.5)

## 2018-08-03 PROCEDURE — 99233 SBSQ HOSP IP/OBS HIGH 50: CPT

## 2018-08-03 RX ORDER — SODIUM CHLORIDE 0.65 %
1 AEROSOL, SPRAY (ML) NASAL
Qty: 0 | Refills: 0 | Status: DISCONTINUED | OUTPATIENT
Start: 2018-08-03 | End: 2018-08-08

## 2018-08-03 RX ORDER — WARFARIN SODIUM 2.5 MG/1
10 TABLET ORAL ONCE
Qty: 0 | Refills: 0 | Status: COMPLETED | OUTPATIENT
Start: 2018-08-03 | End: 2018-08-03

## 2018-08-03 RX ORDER — LOSARTAN POTASSIUM 100 MG/1
25 TABLET, FILM COATED ORAL DAILY
Qty: 0 | Refills: 0 | Status: DISCONTINUED | OUTPATIENT
Start: 2018-08-04 | End: 2018-08-08

## 2018-08-03 RX ORDER — FLUTICASONE PROPIONATE 50 MCG
1 SPRAY, SUSPENSION NASAL
Qty: 0 | Refills: 0 | Status: DISCONTINUED | OUTPATIENT
Start: 2018-08-03 | End: 2018-08-08

## 2018-08-03 RX ORDER — SODIUM CHLORIDE 9 MG/ML
1000 INJECTION INTRAMUSCULAR; INTRAVENOUS; SUBCUTANEOUS
Qty: 0 | Refills: 0 | Status: DISCONTINUED | OUTPATIENT
Start: 2018-08-03 | End: 2018-08-08

## 2018-08-03 RX ADMIN — Medication 4: at 17:13

## 2018-08-03 RX ADMIN — Medication 1 TABLET(S): at 05:55

## 2018-08-03 RX ADMIN — Medication 81 MILLIGRAM(S): at 11:36

## 2018-08-03 RX ADMIN — PREGABALIN 1000 MICROGRAM(S): 225 CAPSULE ORAL at 11:36

## 2018-08-03 RX ADMIN — WARFARIN SODIUM 10 MILLIGRAM(S): 2.5 TABLET ORAL at 23:05

## 2018-08-03 RX ADMIN — Medication 2: at 11:41

## 2018-08-03 RX ADMIN — LOSARTAN POTASSIUM 100 MILLIGRAM(S): 100 TABLET, FILM COATED ORAL at 05:55

## 2018-08-03 RX ADMIN — Medication 25 MILLIGRAM(S): at 05:55

## 2018-08-03 RX ADMIN — Medication 1 TABLET(S): at 11:36

## 2018-08-03 RX ADMIN — Medication 2000 UNIT(S): at 11:36

## 2018-08-03 RX ADMIN — INSULIN GLARGINE 20 UNIT(S): 100 INJECTION, SOLUTION SUBCUTANEOUS at 23:11

## 2018-08-03 RX ADMIN — SODIUM CHLORIDE 50 MILLILITER(S): 9 INJECTION INTRAMUSCULAR; INTRAVENOUS; SUBCUTANEOUS at 12:50

## 2018-08-03 RX ADMIN — Medication 1 SPRAY(S): at 18:07

## 2018-08-03 RX ADMIN — HEPARIN SODIUM 1300 UNIT(S)/HR: 5000 INJECTION INTRAVENOUS; SUBCUTANEOUS at 06:37

## 2018-08-03 RX ADMIN — Medication 88 MICROGRAM(S): at 05:55

## 2018-08-03 RX ADMIN — Medication 25 MILLIGRAM(S): at 17:13

## 2018-08-03 NOTE — PROVIDER CONTACT NOTE (OTHER) - NAME OF MD/NP/PA/DO NOTIFIED:
FIDENCIO, SPOKE WITH TAYO FROM OFFICE
JENNIFER, SPOKE WITH TAYO FROM OFFICE.
Dr. EMILY Perez
Emelyn Perez, NP

## 2018-08-03 NOTE — PROGRESS NOTE ADULT - ASSESSMENT
78 / F with PMHx HTN, DM2, hypothyroidism, chronic back pain, urolithiasis 9mm s/p stent then lithotripsy then  s/p stent s/p removal Dr. Alvarez, c/b laryngeal inflammation with altered voice admitted on 8/1/18 with c/o  sudden onset right lower chest pain/RUQ pain associated with inability to take a deep breath.    * Right sided Segmental Pulmonary Embolism  with acute hypoxic respiratory failure  *  Provoked PE from being sedentary in the last 5 weeks.   * Right sided DVT   - CICU   - cont supportive O2  - cont heparin drip with coumadin , AC service consult   - pt and her daughter want to go with coumadin; so give 10 mg tonight and then 5 mg daily, titrate dose with response to INR. Daily INR  - Echo - EF 60% , LVH , mild AS, diastolic dysfunction, dilated RV with preserves function   - cardio/pulmo consults   - Leg venous dopplers - positive Right DVT   - INR daily   - AC consult   - outpatient thrombophilia work-up  and TAMICA evaluation  - check O2 on ambulation    * STEPHEN   - start IV fluids  - reduce dose of losartan 50--> 25   - repeat renal function in am    * Headaches r/o acute pathology, suspected sinusitis vs migraine  - CT head   - neg   - Ct sinuses - neg   - tyleno, fiorecet prn  - add flonase, nasal saline spray     *  HTN: overcontrolled   - noted low HR in 50th   - cont losartan 50--> 25   - lower dose of  metoprolol 25 bid  -  hold HCTZ    *  Hypothyroidism: cont synthroid      *  DM 2:   - Hold oral hypoglycemics  - lantus + ISS  - Hemoglobin A1C, 6.7     *  Altered voice from intubation during ureteral stenting:  Speech therapist consult    * Mild anemia  - check iron studies, b12, folate    *  DVT PPX:  heparin drip, coumadin  time spend 40 min

## 2018-08-03 NOTE — PROGRESS NOTE ADULT - ASSESSMENT
- pulmonary embolism with segmental emboli in the right lower lobe branches.  - dvt of the right leg   - obesity   - suggestion of sleep apnea   - likely risk factor is recent immobility with  urological procedures .    PLAN     - continue with the heparin and coumadin   - over lap of 5 days until the INR is therapeutic.  - follow up echo report   - monitor hb closely .  - 02 supplementation for the hypoxia   - patient would benifit from the out pat sleep studies   - monitor for the bleeding .

## 2018-08-03 NOTE — PROGRESS NOTE ADULT - ASSESSMENT
A: 57 yo female with + Right lung PE with + heart strain and RLE DVT      8-2-18 spoke with pt in regards to options for AC and pt prefers coumadin  plan:   coumadin 10mg x one today with heparin bridge x 5 day overlap ( today 3/5 has had 10mg in two days inr remains 1.17 today)  daily cbc, bmp, INR  oob as tolerated     will follow

## 2018-08-04 LAB
ANION GAP SERPL CALC-SCNC: 7 MMOL/L — SIGNIFICANT CHANGE UP (ref 5–17)
APTT BLD: 63.6 SEC — HIGH (ref 27.5–37.4)
BUN SERPL-MCNC: 28 MG/DL — HIGH (ref 7–23)
CALCIUM SERPL-MCNC: 8.6 MG/DL — SIGNIFICANT CHANGE UP (ref 8.5–10.1)
CHLORIDE SERPL-SCNC: 105 MMOL/L — SIGNIFICANT CHANGE UP (ref 96–108)
CO2 SERPL-SCNC: 29 MMOL/L — SIGNIFICANT CHANGE UP (ref 22–31)
CREAT SERPL-MCNC: 1.16 MG/DL — SIGNIFICANT CHANGE UP (ref 0.5–1.3)
GLUCOSE BLDC GLUCOMTR-MCNC: 141 MG/DL — HIGH (ref 70–99)
GLUCOSE BLDC GLUCOMTR-MCNC: 152 MG/DL — HIGH (ref 70–99)
GLUCOSE BLDC GLUCOMTR-MCNC: 154 MG/DL — HIGH (ref 70–99)
GLUCOSE BLDC GLUCOMTR-MCNC: 160 MG/DL — HIGH (ref 70–99)
GLUCOSE SERPL-MCNC: 117 MG/DL — HIGH (ref 70–99)
HCT VFR BLD CALC: 31.6 % — LOW (ref 34.5–45)
HGB BLD-MCNC: 10.6 G/DL — LOW (ref 11.5–15.5)
INR BLD: 1.24 RATIO — HIGH (ref 0.88–1.16)
MCHC RBC-ENTMCNC: 29.9 PG — SIGNIFICANT CHANGE UP (ref 27–34)
MCHC RBC-ENTMCNC: 33.5 GM/DL — SIGNIFICANT CHANGE UP (ref 32–36)
MCV RBC AUTO: 89.3 FL — SIGNIFICANT CHANGE UP (ref 80–100)
NRBC # BLD: 0 /100 WBCS — SIGNIFICANT CHANGE UP (ref 0–0)
PLATELET # BLD AUTO: 163 K/UL — SIGNIFICANT CHANGE UP (ref 150–400)
POTASSIUM SERPL-MCNC: 3.9 MMOL/L — SIGNIFICANT CHANGE UP (ref 3.5–5.3)
POTASSIUM SERPL-SCNC: 3.9 MMOL/L — SIGNIFICANT CHANGE UP (ref 3.5–5.3)
PROTHROM AB SERPL-ACNC: 13.4 SEC — HIGH (ref 9.8–12.7)
RBC # BLD: 3.54 M/UL — LOW (ref 3.8–5.2)
RBC # FLD: 13.1 % — SIGNIFICANT CHANGE UP (ref 10.3–14.5)
SODIUM SERPL-SCNC: 141 MMOL/L — SIGNIFICANT CHANGE UP (ref 135–145)
WBC # BLD: 5.94 K/UL — SIGNIFICANT CHANGE UP (ref 3.8–10.5)
WBC # FLD AUTO: 5.94 K/UL — SIGNIFICANT CHANGE UP (ref 3.8–10.5)

## 2018-08-04 PROCEDURE — 99233 SBSQ HOSP IP/OBS HIGH 50: CPT

## 2018-08-04 RX ORDER — WARFARIN SODIUM 2.5 MG/1
10 TABLET ORAL ONCE
Qty: 0 | Refills: 0 | Status: COMPLETED | OUTPATIENT
Start: 2018-08-04 | End: 2018-08-04

## 2018-08-04 RX ADMIN — HEPARIN SODIUM 1300 UNIT(S)/HR: 5000 INJECTION INTRAVENOUS; SUBCUTANEOUS at 09:45

## 2018-08-04 RX ADMIN — Medication 1 TABLET(S): at 03:03

## 2018-08-04 RX ADMIN — Medication 2000 UNIT(S): at 11:51

## 2018-08-04 RX ADMIN — Medication 1 SPRAY(S): at 18:00

## 2018-08-04 RX ADMIN — Medication 25 MILLIGRAM(S): at 18:00

## 2018-08-04 RX ADMIN — LOSARTAN POTASSIUM 25 MILLIGRAM(S): 100 TABLET, FILM COATED ORAL at 05:11

## 2018-08-04 RX ADMIN — INSULIN GLARGINE 20 UNIT(S): 100 INJECTION, SOLUTION SUBCUTANEOUS at 22:18

## 2018-08-04 RX ADMIN — Medication 1 TABLET(S): at 11:51

## 2018-08-04 RX ADMIN — SODIUM CHLORIDE 50 MILLILITER(S): 9 INJECTION INTRAMUSCULAR; INTRAVENOUS; SUBCUTANEOUS at 05:16

## 2018-08-04 RX ADMIN — Medication 1 TABLET(S): at 05:16

## 2018-08-04 RX ADMIN — Medication 0: at 22:18

## 2018-08-04 RX ADMIN — Medication 88 MICROGRAM(S): at 05:12

## 2018-08-04 RX ADMIN — Medication 2: at 18:00

## 2018-08-04 RX ADMIN — Medication 1 SPRAY(S): at 05:12

## 2018-08-04 RX ADMIN — Medication 25 MILLIGRAM(S): at 05:13

## 2018-08-04 RX ADMIN — WARFARIN SODIUM 10 MILLIGRAM(S): 2.5 TABLET ORAL at 22:18

## 2018-08-04 RX ADMIN — PREGABALIN 1000 MICROGRAM(S): 225 CAPSULE ORAL at 11:51

## 2018-08-04 RX ADMIN — Medication 81 MILLIGRAM(S): at 11:51

## 2018-08-04 NOTE — PROGRESS NOTE ADULT - ASSESSMENT
A: 57 yo female with + Right lung PE with + heart strain and RLE DVT      8-2-18 spoke with pt in regards to options for AC and pt prefers coumadin    Plan:  :: Continue Coumadin 10mg PO (today is day #4/5) with heparin bridge x 5 day overlap  :: Daily cbc, bmp, INR  :: Oob as tolerated  ::Venodyne contraind    Will continue to follow.

## 2018-08-04 NOTE — PROGRESS NOTE ADULT - ASSESSMENT
78 / F with PMHx HTN, DM2, hypothyroidism, chronic back pain, urolithiasis 9mm s/p stent then lithotripsy then  s/p stent s/p removal Dr. Alvarez, c/b laryngeal inflammation with altered voice admitted on 8/1/18 with c/o  sudden onset right lower chest pain/RUQ pain associated with inability to take a deep breath.    * Right sided Segmental Pulmonary Embolism  with acute hypoxic respiratory failure  *  Provoked PE from being sedentary in the last 5 weeks.   * Right sided DVT   - CICU   - cont supportive O2  - cont heparin drip with coumadin , AC service consult   - pt and her daughter want to go with coumadin; so give 10 mg tonight and then 5 mg daily, titrate dose with response to INR. Daily INR  - Echo - EF 60% , LVH , mild AS, diastolic dysfunction, dilated RV with preserves function   - cardio/pulmo consults   - Leg venous dopplers - positive Right DVT   - INR daily   - AC consult   - outpatient thrombophilia work-up  and TAMICA evaluation  - check O2 on ambulation    * STEPHEN   - start IV fluids  - reduce dose of losartan 50--> 25   - repeat renal function in am - better     * Headaches r/o acute pathology, suspected sinusitis vs migraine  - CT head   - neg   - Ct sinuses - neg   - tyleno, fiorecet prn  - add flonase, nasal saline spray     *  HTN: overcontrolled   - noted low HR in 50th   - cont losartan 50--> 25   - lower dose of  metoprolol 25 bid  -  hold HCTZ    *  Hypothyroidism: cont synthroid      *  DM 2:   - Hold oral hypoglycemics  - lantus + ISS  - Hemoglobin A1C, 6.7     *  Altered voice from intubation during ureteral stenting:  Speech therapist consult    * Mild anemia  - check iron studies, b12, folate - wnl   - likely dilutional     *  DVT PPX:  heparin drip, coumadin  time spend 40 min

## 2018-08-04 NOTE — CONSULT NOTE ADULT - SUBJECTIVE AND OBJECTIVE BOX
CHIEF COMPLAINT:    HPI:  78 / F with PMHx HTN, DM2, hypothyroidism, chronic back pain, urolithiasis 9mm s/p stent then lithotripsy then  s/p stent s/p removal Dr. Alvarez, c/b laryngeal inflammation with altered voice,  came to ED for c/o  sudden onset right lower chest pain/RUQ pain associated with inability to take a deep breath. It started last night. Pain was severe and was not relieved with hydrocodone. Also, associated with SOB.  No fevers, chills, sweats, weight loss, fatigue, or malaise.  Pt has been mostly sedentary in the last one month or so as she had 3 surgeries back to back.  Denies any leg pain.  CTA abdo/chest in ED showed PE with right heart strain.   Pt feeling better at this time with less sob but pleuritic chest pain on right side present. She is on 2 L of O2 via nc.  Venous Doppler showed RLE DVT.      PAST MEDICAL & SURGICAL HISTORY:  Hypovitaminosis D  Renal cyst  Hypercholesterolemia  Spinal stenosis  Obesity  Poliomyelitis  Arytenoid finding: left arytenoid and vocal fold hematoma secondary to difficult intubation  Chronic back pain  Hypothyroidism  DM (diabetes mellitus)  Hypertension  History of tonsillectomy  Bartholin cyst  Ectopic pregnancy  Abscess of breast: drainage of abscess left breast  S/P shoulder replacement, left: partial  Encounter for removal of ureteral stent  S/P cholecystectomy      Allergies    Ammoniated Mercury (Rash)  Keflex (Pruritus)  penicillin G potassium (Pruritus; Rash)  Victoza (Other)    Intolerances        Occupation:  Alochol: Denied  Smoking: Denied  Drug Use: Denied  Marital Status:         FAMILY HISTORY:  Family history of hypertension (Father, Mother)  Family history of diabetes mellitus (Sibling, Grandparent): Brother and grandmother      REVIEW OF SYSTEMS:    CONSTITUTIONAL: No weakness, fevers or chills  EYES/ENT: No visual changes;  No vertigo or throat pain   NECK: No pain or stiffness  RESPIRATORY: No cough, wheezing, hemoptysis; No shortness of breath  CARDIOVASCULAR: No chest pain or palpitations  GASTROINTESTINAL: No abdominal or epigastric pain. No nausea, vomiting, or hematemesis; No diarrhea or constipation. No melena or hematochezia.  GENITOURINARY: No dysuria, frequency or hematuria  NEUROLOGICAL: No numbness or weakness  SKIN: No itching, burning, rashes, or lesions   All other review of systems is negative unless indicated above    Vital Signs Last 24 Hrs  T(C): 36.2 (04 Aug 2018 06:30), Max: 36.2 (04 Aug 2018 06:30)  T(F): 97.2 (04 Aug 2018 06:30), Max: 97.2 (04 Aug 2018 06:30)  HR: 56 (04 Aug 2018 08:00) (51 - 67)  BP: 120/45 (04 Aug 2018 08:00) (106/45 - 130/47)  BP(mean): 64 (04 Aug 2018 08:00) (60 - 92)  RR: 13 (04 Aug 2018 08:00) (13 - 18)  SpO2: 100% (04 Aug 2018 04:00) (91% - 100%)    I&O's Summary    03 Aug 2018 07:01  -  04 Aug 2018 07:00  --------------------------------------------------------  IN: 1099 mL / OUT: 3 mL / NET: 1096 mL        PHYSICAL EXAM:    Constitutional: NAD, awake and alert, well-developed  HEENT: PERR, EOMI,  No oral cyananosis.  Neck:  supple,  No JVD  Respiratory: Breath sounds are clear bilaterally, No wheezing, rales or rhonchi  Cardiovascular: S1 and S2, regular rate and rhythm, no Murmurs, gallops or rubs  Gastrointestinal: Bowel Sounds present, soft, nontender.   Extremities: No peripheral edema. No clubbing or cyanosis.  Vascular: 2+ peripheral pulses  Neurological: A/O x 3, no focal deficits  Musculoskeletal: no calf tenderness.  Skin: No rashes.    MEDICATIONS:  MEDICATIONS  (STANDING):  aspirin enteric coated 81 milliGRAM(s) Oral daily  cholecalciferol 2000 Unit(s) Oral daily  cyanocobalamin 1000 MICROGram(s) Oral daily  dextrose 5%. 1000 milliLiter(s) (50 mL/Hr) IV Continuous <Continuous>  dextrose 50% Injectable 12.5 Gram(s) IV Push once  dextrose 50% Injectable 25 Gram(s) IV Push once  dextrose 50% Injectable 25 Gram(s) IV Push once  fluticasone propionate 50 MICROgram(s)/spray Nasal Spray 1 Spray(s) Both Nostrils two times a day  heparin  Infusion.  Unit(s)/Hr (21 mL/Hr) IV Continuous <Continuous>  insulin glargine Injectable (LANTUS) 20 Unit(s) SubCutaneous at bedtime  insulin lispro (HumaLOG) corrective regimen sliding scale   SubCutaneous three times a day before meals  insulin lispro (HumaLOG) corrective regimen sliding scale   SubCutaneous at bedtime  levothyroxine 88 MICROGram(s) Oral daily  losartan 25 milliGRAM(s) Oral daily  metoprolol tartrate 25 milliGRAM(s) Oral two times a day  multivitamin/minerals 1 Tablet(s) Oral daily  sodium chloride 0.9%. 1000 milliLiter(s) (50 mL/Hr) IV Continuous <Continuous>  warfarin 10 milliGRAM(s) Oral once      LABS: All Labs Reviewed:                        10.6   5.94  )-----------( 163      ( 04 Aug 2018 05:21 )             31.6                         11.9   5.56  )-----------( 155      ( 03 Aug 2018 05:17 )             36.3                         10.4   6.52  )-----------( 169      ( 02 Aug 2018 04:34 )             32.0     04 Aug 2018 05:21    141    |  105    |  28     ----------------------------<  117    3.9     |  29     |  1.16   03 Aug 2018 05:17    136    |  102    |  30     ----------------------------<  106    3.8     |  28     |  1.46   02 Aug 2018 04:34    138    |  106    |  16     ----------------------------<  105    3.5     |  29     |  1.09     Ca    8.6        04 Aug 2018 05:21  Ca    8.8        03 Aug 2018 05:17  Ca    8.3        02 Aug 2018 04:34      PT/INR - ( 04 Aug 2018 05:21 )   PT: 13.4 sec;   INR: 1.24 ratio         PTT - ( 04 Aug 2018 05:21 )  PTT:63.6 sec  CARDIAC MARKERS ( 03 Aug 2018 05:17 )  <0.015 ng/mL / x     / x     / x     / x          Blood Culture:         RADIOLOGY/EKG:

## 2018-08-04 NOTE — CONSULT NOTE ADULT - ASSESSMENT
78 / F with PMHx HTN, DM2, hypothyroidism, chronic back pain, urolithiasis 9mm s/p stent then lithotripsy then  s/p stent s/p removal Dr. Alvarez, c/b laryngeal inflammation with altered voice admitted on 8/1/18 with c/o  sudden onset right lower chest pain/RUQ pain associated with inability to take a deep breath.    * Right sided Segmental Pulmonary Embolism  with acute hypoxic respiratory failure  *  Provoked PE from being sedentary in the last 5 weeks.   * Right sided DVT   - CICU   - cont supportive O2  - cont heparin drip with coumadin , AC service consult   - pt and her daughter want to go with coumadin; so give 10 mg tonight and then 5 mg daily, titrate dose with response to INR. Daily INR  - Echo - EF 60% , LVH , mild AS, diastolic dysfunction, dilated RV with preserves function   - cardio/pulmo consults   - Leg venous dopplers - positive Right DVT   - INR daily   - AC consult   - outpatient thrombophilia work-up  and TAMICA evaluation  - check O2 on ambulation    * STEPHEN   - start IV fluids  - reduce dose of losartan 50--> 25   - repeat renal function in am    * Headaches r/o acute pathology, suspected sinusitis vs migraine  - CT head   - neg   - Ct sinuses - neg    78 / F with PMHx HTN, DM2, hypothyroidism, chronic back pain, urolithiasis 9mm s/p stent then lithotripsy then  s/p stent s/p removal Dr. Alvarez, c/b laryngeal inflammation with altered voice admitted on 8/1/18 with c/o  sudden onset right lower chest pain/RUQ pain associated with inability to take a deep breath.    * Right sided Segmental Pulmonary Embolism  with acute hypoxic respiratory failure  *  Provoked PE from being sedentary in the last 5 weeks.   * Right sided DVT   - CICU   - cont supportive O2  - cont heparin drip with coumadin , AC service consult   - pt and her daughter want to go with coumadin; so give 10 mg tonight and then 5 mg daily, titrate dose with response to INR. Daily INR  - Echo - EF 60% , LVH , mild AS, diastolic dysfunction, dilated RV with preserves function   - pulmo consults   - Leg venous dopplers - positive Right DVT   - INR daily   - AC consult   - outpatient thrombophilia work-up  and TAMICA evaluation  - check O2 on ambulation    * STEPHEN   - start IV fluids  - reduce dose of losartan 50--> 25   - repeat renal function in am    * Headaches r/o acute pathology, suspected sinusitis vs migraine  - CT head   - neg   - Ct sinuses - neg    aleks puentes prn  - add flonase, nasal saline spray     *  HTN: overcontrolled   - noted low HR in 50th   - cont losartan 50--> 25   - lower dose of  metoprolol 25 bid  -  hold HCTZ    *  Hypothyroidism: cont synthroid      *  DM 2:   - Hold oral hypoglycemics  - lantus + ISS  - Hemoglobin A1C, 6.7     *  Altered voice from intubation during ureteral stenting:  Speech therapist consult    * Mild anemia  - check iron studies, b12, folate    *  DVT PPX:  heparin drip, coumadin
A: 57 yo female with + Right lung PE with + heart strain and RLE DVT      P: spoke with pt in regards to options for AC and pt prefers coumadin    Cont coumadin with heparin bridge x 5 day overlap ( today 2/5)  daily cbc, bmp, INR    thank you for the consult will follow
77yo F with acute RIGHT subsegmental pulmonary emboli   only risk factors are morbid obesity, and recent relative immobility following ureteral stent/lithotripsy in last month.  cardiac markers, hemodynamics, respiratory function reasonable.  Not a candidate for tPA or embolectomy at present - No acute intervention required at this time.    Plan should include:  LE venous duplex to assess for further clot burden  systemic anticoagulation  2D ECHO to assess for R heart strain  MIKA  O2 as needed  Supportive care    case d/w Dr Fontaine of the hospitalist service who will assume care for the patient.

## 2018-08-05 LAB
ANION GAP SERPL CALC-SCNC: 6 MMOL/L — SIGNIFICANT CHANGE UP (ref 5–17)
APTT BLD: 75.8 SEC — HIGH (ref 27.5–37.4)
BUN SERPL-MCNC: 19 MG/DL — SIGNIFICANT CHANGE UP (ref 7–23)
CALCIUM SERPL-MCNC: 8.4 MG/DL — LOW (ref 8.5–10.1)
CHLORIDE SERPL-SCNC: 108 MMOL/L — SIGNIFICANT CHANGE UP (ref 96–108)
CO2 SERPL-SCNC: 31 MMOL/L — SIGNIFICANT CHANGE UP (ref 22–31)
CREAT SERPL-MCNC: 0.97 MG/DL — SIGNIFICANT CHANGE UP (ref 0.5–1.3)
GLUCOSE BLDC GLUCOMTR-MCNC: 129 MG/DL — HIGH (ref 70–99)
GLUCOSE BLDC GLUCOMTR-MCNC: 131 MG/DL — HIGH (ref 70–99)
GLUCOSE BLDC GLUCOMTR-MCNC: 153 MG/DL — HIGH (ref 70–99)
GLUCOSE BLDC GLUCOMTR-MCNC: 226 MG/DL — HIGH (ref 70–99)
GLUCOSE SERPL-MCNC: 121 MG/DL — HIGH (ref 70–99)
HCT VFR BLD CALC: 32.7 % — LOW (ref 34.5–45)
HGB BLD-MCNC: 10.6 G/DL — LOW (ref 11.5–15.5)
INR BLD: 1.58 RATIO — HIGH (ref 0.88–1.16)
MCHC RBC-ENTMCNC: 29.4 PG — SIGNIFICANT CHANGE UP (ref 27–34)
MCHC RBC-ENTMCNC: 32.4 GM/DL — SIGNIFICANT CHANGE UP (ref 32–36)
MCV RBC AUTO: 90.8 FL — SIGNIFICANT CHANGE UP (ref 80–100)
NRBC # BLD: 0 /100 WBCS — SIGNIFICANT CHANGE UP (ref 0–0)
PLATELET # BLD AUTO: 161 K/UL — SIGNIFICANT CHANGE UP (ref 150–400)
POTASSIUM SERPL-MCNC: 4.5 MMOL/L — SIGNIFICANT CHANGE UP (ref 3.5–5.3)
POTASSIUM SERPL-SCNC: 4.5 MMOL/L — SIGNIFICANT CHANGE UP (ref 3.5–5.3)
PROTHROM AB SERPL-ACNC: 17.2 SEC — HIGH (ref 9.8–12.7)
RBC # BLD: 3.6 M/UL — LOW (ref 3.8–5.2)
RBC # FLD: 13.3 % — SIGNIFICANT CHANGE UP (ref 10.3–14.5)
SODIUM SERPL-SCNC: 145 MMOL/L — SIGNIFICANT CHANGE UP (ref 135–145)
WBC # BLD: 5.63 K/UL — SIGNIFICANT CHANGE UP (ref 3.8–10.5)
WBC # FLD AUTO: 5.63 K/UL — SIGNIFICANT CHANGE UP (ref 3.8–10.5)

## 2018-08-05 PROCEDURE — 99233 SBSQ HOSP IP/OBS HIGH 50: CPT

## 2018-08-05 RX ORDER — WARFARIN SODIUM 2.5 MG/1
10 TABLET ORAL ONCE
Qty: 0 | Refills: 0 | Status: COMPLETED | OUTPATIENT
Start: 2018-08-05 | End: 2018-08-05

## 2018-08-05 RX ADMIN — Medication 88 MICROGRAM(S): at 06:17

## 2018-08-05 RX ADMIN — Medication 81 MILLIGRAM(S): at 11:25

## 2018-08-05 RX ADMIN — Medication 2000 UNIT(S): at 11:26

## 2018-08-05 RX ADMIN — Medication 25 MILLIGRAM(S): at 17:14

## 2018-08-05 RX ADMIN — WARFARIN SODIUM 10 MILLIGRAM(S): 2.5 TABLET ORAL at 21:27

## 2018-08-05 RX ADMIN — INSULIN GLARGINE 20 UNIT(S): 100 INJECTION, SOLUTION SUBCUTANEOUS at 21:27

## 2018-08-05 RX ADMIN — Medication 1 SPRAY(S): at 17:16

## 2018-08-05 RX ADMIN — LOSARTAN POTASSIUM 25 MILLIGRAM(S): 100 TABLET, FILM COATED ORAL at 06:17

## 2018-08-05 RX ADMIN — Medication 1 TABLET(S): at 11:26

## 2018-08-05 RX ADMIN — Medication 4: at 11:37

## 2018-08-05 RX ADMIN — Medication 25 MILLIGRAM(S): at 06:17

## 2018-08-05 RX ADMIN — HEPARIN SODIUM 1300 UNIT(S)/HR: 5000 INJECTION INTRAVENOUS; SUBCUTANEOUS at 06:18

## 2018-08-05 RX ADMIN — Medication 1 TABLET(S): at 02:03

## 2018-08-05 RX ADMIN — Medication 2: at 17:14

## 2018-08-05 RX ADMIN — Medication 1 SPRAY(S): at 06:16

## 2018-08-05 RX ADMIN — PREGABALIN 1000 MICROGRAM(S): 225 CAPSULE ORAL at 11:26

## 2018-08-05 NOTE — PROGRESS NOTE ADULT - ASSESSMENT
78 / F with PMHx HTN, DM2, hypothyroidism, chronic back pain, urolithiasis 9mm s/p stent then lithotripsy then  s/p stent s/p removal Dr. Alvarez, c/b laryngeal inflammation with altered voice admitted on 8/1/18 with c/o  sudden onset right lower chest pain/RUQ pain associated with inability to take a deep breath.    * Right sided Segmental Pulmonary Embolism  with acute hypoxic respiratory failure  *  Provoked PE from being sedentary in the last 5 weeks.   * Right sided DVT   - CICU   - cont supportive O2  - cont heparin drip with coumadin , AC service consult   - pt and her daughter want to go with coumadin; so give 10 mg tonight and then 5 mg daily, titrate dose with response to INR. Daily INR  - Echo - EF 60% , LVH , mild AS, diastolic dysfunction, dilated RV with preserves function   - will no longer follow  - Leg venous dopplers - positive Right DVT   - INR daily   - AC consult   - outpatient thrombophilia work-up  and TAMICA evaluation  - check O2 on ambulation    * STEPHEN   - start IV fluids  - reduce dose of losartan 50--> 25   - repeat renal function in am    * Headaches r/o acute pathology, suspected sinusitis vs migraine  - CT head   - neg   - Ct sinuses - neg    78 / F with PMHx HTN, DM2, hypothyroidism, chronic back pain, urolithiasis 9mm s/p stent then lithotripsy then  s/p stent s/p removal Dr. Alvarez, c/b laryngeal inflammation with altered voice admitted on 8/1/18 with c/o  sudden onset right lower chest pain/RUQ pain associated with inability to take a deep breath.    * Right sided Segmental Pulmonary Embolism  with acute hypoxic respiratory failure  *  Provoked PE from being sedentary in the last 5 weeks.   * Right sided DVT   - CICU   - cont supportive O2  - cont heparin drip with coumadin , AC service consult   - pt and her daughter want to go with coumadin; so give 10 mg tonight and then 5 mg daily, titrate dose with response to INR. Daily INR  - Echo - EF 60% , LVH , mild AS, diastolic dysfunction, dilated RV with preserves function   - pulmo consults   - Leg venous dopplers - positive Right DVT   - INR daily   - AC consult   - outpatient thrombophilia work-up  and TAMICA evaluation  - check O2 on ambulation    * STEPHEN   - start IV fluids  - reduce dose of losartan 50--> 25   - repeat renal function in am    * Headaches r/o acute pathology, suspected sinusitis vs migraine  - CT head   - neg   - Ct sinuses - neg    aleks puentes prn  - add flonase, nasal saline spray     *  HTN: overcontrolled   - noted low HR in 50th   - cont losartan 50--> 25   - lower dose of  metoprolol 25 bid  -  hold HCTZ    *  Hypothyroidism: cont synthroid      *  DM 2:   - Hold oral hypoglycemics  - lantus + ISS  - Hemoglobin A1C, 6.7     *  Altered voice from intubation during ureteral stenting:  Speech therapist consult    * Mild anemia  - check iron studies, b12, folate    *  DVT PPX:  heparin drip, coumadin

## 2018-08-05 NOTE — PROGRESS NOTE ADULT - ASSESSMENT
A: 57 yo female with + Right lung PE with + heart strain and RLE DVT      8-2-18 spoke with pt in regards to options for AC and pt prefers coumadin    Plan:  :: Continue Coumadin 10mg PO (today is day #5/5) with heparin bridge x 5 day overlap  :: Daily cbc, bmp, INR  :: Oob as tolerated  ::Venodyne contraind    Will continue to follow.

## 2018-08-05 NOTE — PROGRESS NOTE ADULT - ASSESSMENT
78 / F with PMHx HTN, DM2, hypothyroidism, chronic back pain, urolithiasis 9mm s/p stent then lithotripsy then  s/p stent s/p removal Dr. Alvarez, c/b laryngeal inflammation with altered voice admitted on 8/1/18 with c/o  sudden onset right lower chest pain/RUQ pain associated with inability to take a deep breath.    * Right sided Segmental Pulmonary Embolism  with acute hypoxic respiratory failure  *  Provoked PE from being sedentary in the last 5 weeks.   * Right sided DVT   - CICU   - cont supportive O2  - cont heparin drip with coumadin , AC service consult   - pt and her daughter want to go with coumadin; so give 10 mg tonight and then 5 mg daily, titrate dose with response to INR. Daily INR  - Echo - EF 60% , LVH , mild AS, diastolic dysfunction, dilated RV with preserves function   - cardio/pulmo consults   - Leg venous dopplers - positive Right DVT   - INR daily  1.5 today   - AC consult   - outpatient thrombophilia work-up  and TAMICA evaluation  - check O2 on ambulation - drops to 85% on ambulation, pt needs O2 supply at home     * STEPHEN   - start IV fluids  - reduce dose of losartan 50--> 25   - repeat renal function in am - better     * Headaches r/o acute pathology, suspected sinusitis vs migraine  - CT head   - neg   - Ct sinuses - neg   - aleks puentes prn  - add flonase, nasal saline spray     *  HTN: overcontrolled   - noted low HR in 50th   - cont losartan 50--> 25   - lower dose of  metoprolol 25 bid  -  hold HCTZ    *  Hypothyroidism: cont synthroid      *  DM 2:   - Hold oral hypoglycemics  - lantus + ISS  - Hemoglobin A1C, 6.7     *  Altered voice from intubation during ureteral stenting:  Speech therapist consult    * Mild anemia  - check iron studies, b12, folate - wnl   - likely dilutional     *  DVT PPX:  heparin drip, coumadin  time spend 40 min

## 2018-08-06 LAB
ANION GAP SERPL CALC-SCNC: 8 MMOL/L — SIGNIFICANT CHANGE UP (ref 5–17)
APTT BLD: 94.2 SEC — HIGH (ref 27.5–37.4)
BUN SERPL-MCNC: 16 MG/DL — SIGNIFICANT CHANGE UP (ref 7–23)
CALCIUM SERPL-MCNC: 8.5 MG/DL — SIGNIFICANT CHANGE UP (ref 8.5–10.1)
CHLORIDE SERPL-SCNC: 108 MMOL/L — SIGNIFICANT CHANGE UP (ref 96–108)
CO2 SERPL-SCNC: 28 MMOL/L — SIGNIFICANT CHANGE UP (ref 22–31)
CREAT SERPL-MCNC: 0.9 MG/DL — SIGNIFICANT CHANGE UP (ref 0.5–1.3)
GLUCOSE BLDC GLUCOMTR-MCNC: 122 MG/DL — HIGH (ref 70–99)
GLUCOSE BLDC GLUCOMTR-MCNC: 129 MG/DL — HIGH (ref 70–99)
GLUCOSE BLDC GLUCOMTR-MCNC: 151 MG/DL — HIGH (ref 70–99)
GLUCOSE BLDC GLUCOMTR-MCNC: 178 MG/DL — HIGH (ref 70–99)
GLUCOSE SERPL-MCNC: 120 MG/DL — HIGH (ref 70–99)
HCT VFR BLD CALC: 32.6 % — LOW (ref 34.5–45)
HGB BLD-MCNC: 10.7 G/DL — LOW (ref 11.5–15.5)
INR BLD: 1.84 RATIO — HIGH (ref 0.88–1.16)
MCHC RBC-ENTMCNC: 29.9 PG — SIGNIFICANT CHANGE UP (ref 27–34)
MCHC RBC-ENTMCNC: 32.8 GM/DL — SIGNIFICANT CHANGE UP (ref 32–36)
MCV RBC AUTO: 91.1 FL — SIGNIFICANT CHANGE UP (ref 80–100)
NRBC # BLD: 0 /100 WBCS — SIGNIFICANT CHANGE UP (ref 0–0)
PLATELET # BLD AUTO: 152 K/UL — SIGNIFICANT CHANGE UP (ref 150–400)
POTASSIUM SERPL-MCNC: 4.1 MMOL/L — SIGNIFICANT CHANGE UP (ref 3.5–5.3)
POTASSIUM SERPL-SCNC: 4.1 MMOL/L — SIGNIFICANT CHANGE UP (ref 3.5–5.3)
PROTHROM AB SERPL-ACNC: 20.1 SEC — HIGH (ref 9.8–12.7)
RBC # BLD: 3.58 M/UL — LOW (ref 3.8–5.2)
RBC # FLD: 13.2 % — SIGNIFICANT CHANGE UP (ref 10.3–14.5)
SODIUM SERPL-SCNC: 144 MMOL/L — SIGNIFICANT CHANGE UP (ref 135–145)
WBC # BLD: 5.6 K/UL — SIGNIFICANT CHANGE UP (ref 3.8–10.5)
WBC # FLD AUTO: 5.6 K/UL — SIGNIFICANT CHANGE UP (ref 3.8–10.5)

## 2018-08-06 PROCEDURE — 99233 SBSQ HOSP IP/OBS HIGH 50: CPT

## 2018-08-06 RX ORDER — WARFARIN SODIUM 2.5 MG/1
7.5 TABLET ORAL DAILY
Qty: 0 | Refills: 0 | Status: DISCONTINUED | OUTPATIENT
Start: 2018-08-06 | End: 2018-08-08

## 2018-08-06 RX ORDER — METOPROLOL TARTRATE 50 MG
25 TABLET ORAL DAILY
Qty: 0 | Refills: 0 | Status: DISCONTINUED | OUTPATIENT
Start: 2018-08-07 | End: 2018-08-08

## 2018-08-06 RX ADMIN — WARFARIN SODIUM 7.5 MILLIGRAM(S): 2.5 TABLET ORAL at 21:52

## 2018-08-06 RX ADMIN — Medication 81 MILLIGRAM(S): at 12:12

## 2018-08-06 RX ADMIN — Medication 2: at 12:44

## 2018-08-06 RX ADMIN — LOSARTAN POTASSIUM 25 MILLIGRAM(S): 100 TABLET, FILM COATED ORAL at 05:21

## 2018-08-06 RX ADMIN — Medication 88 MICROGRAM(S): at 05:21

## 2018-08-06 RX ADMIN — Medication 1 SPRAY(S): at 17:48

## 2018-08-06 RX ADMIN — Medication 1 TABLET(S): at 12:12

## 2018-08-06 RX ADMIN — Medication 2: at 17:06

## 2018-08-06 RX ADMIN — Medication 25 MILLIGRAM(S): at 05:21

## 2018-08-06 RX ADMIN — INSULIN GLARGINE 20 UNIT(S): 100 INJECTION, SOLUTION SUBCUTANEOUS at 21:52

## 2018-08-06 RX ADMIN — Medication 1 TABLET(S): at 03:29

## 2018-08-06 RX ADMIN — Medication 1 SPRAY(S): at 05:22

## 2018-08-06 RX ADMIN — PREGABALIN 1000 MICROGRAM(S): 225 CAPSULE ORAL at 12:12

## 2018-08-06 RX ADMIN — Medication 2000 UNIT(S): at 12:12

## 2018-08-06 RX ADMIN — HEPARIN SODIUM 1300 UNIT(S)/HR: 5000 INJECTION INTRAVENOUS; SUBCUTANEOUS at 12:11

## 2018-08-06 NOTE — PROGRESS NOTE ADULT - ASSESSMENT
A: 57 yo female with + Right lung PE with + heart strain and RLE DVT    Plan:  ::  Coumadin 7.5mg PO (today is day #6/5) with heparin bridge x 5 day overlap  :: Daily cbc, bmp, INR  :: Oob as tolerated  ::Venodyne contraind    Will continue to follow.

## 2018-08-06 NOTE — PROGRESS NOTE ADULT - ASSESSMENT
78 / F with PMHx HTN, DM2, hypothyroidism, chronic back pain, urolithiasis 9mm s/p stent then lithotripsy then  s/p stent s/p removal Dr. Alvarez, c/b laryngeal inflammation with altered voice admitted on 8/1/18 with c/o  sudden onset right lower chest pain/RUQ pain associated with inability to take a deep breath.    * Right sided Segmental Pulmonary Embolism  with acute hypoxic respiratory failure, improving   *  Provoked PE from being sedentary in the last 5 weeks.   * Right sided DVT   - CICU   - cont supportive O2  - cont heparin drip with coumadin , AC service consult   - pt and her daughter want to go with coumadin; so give 10 mg tonight and then 5 mg daily, titrate dose with response to INR. Daily INR  - Echo - EF 60% , LVH , mild AS, diastolic dysfunction, dilated RV with preserves function   - cardio/pulmo consults   - Leg venous dopplers - positive Right DVT   - INR daily  1.5 today   - AC consult   - outpatient thrombophilia work-up  and TAMICA evaluation  - check O2 on ambulation - drops to 85% on ambulation, pt needs O2 supply at home   - check O2 sats mis in am on ambulation for d/c planning     * STEPHEN   - start IV fluids  - reduce dose of losartan 50--> 25   - repeat renal function in am - better     * Headaches r/o acute pathology, suspected sinusitis vs migraine  - CT head   - neg   - Ct sinuses - neg   - tyleno, fiorecet prn  - add flonase, nasal saline spray     *  HTN: overcontrolled   - noted low HR in 50th   - cont losartan 50--> 25   - lower dose of  metoprolol 25 bid  -  hold HCTZ    *  Hypothyroidism: cont synthroid      *  DM 2:   - Hold oral hypoglycemics  - lantus + ISS  - Hemoglobin A1C, 6.7     *  Altered voice from intubation during ureteral stenting:  Speech therapist consult    * Mild anemia  - check iron studies, b12, folate - wnl   - likely dilutional     *  DVT PPX:  heparin drip, coumadin  time spend 40 min   daughter Nicolette updated over the phone   [phone]

## 2018-08-06 NOTE — PROGRESS NOTE ADULT - ASSESSMENT
- pulmonary embolism with segmental emboli in the right lower lobe branches.  - dvt of the right leg   - obesity   - suggestion of sleep apnea   - likely risk factor is recent immobility with  urological procedures .    PLAN     - continue with the heparin and coumadin   - over lap of 5 days until the INR is therapeutic.  - monitor hb closely .  - 02 supplementation for the hypoxia   - patient would benifit from the out pat sleep studies   - monitor for the bleeding .   - check the sat on room air and with ambulation

## 2018-08-07 LAB
ANION GAP SERPL CALC-SCNC: 7 MMOL/L — SIGNIFICANT CHANGE UP (ref 5–17)
APTT BLD: 97.3 SEC — HIGH (ref 27.5–37.4)
BUN SERPL-MCNC: 19 MG/DL — SIGNIFICANT CHANGE UP (ref 7–23)
CALCIUM SERPL-MCNC: 8.7 MG/DL — SIGNIFICANT CHANGE UP (ref 8.5–10.1)
CHLORIDE SERPL-SCNC: 107 MMOL/L — SIGNIFICANT CHANGE UP (ref 96–108)
CO2 SERPL-SCNC: 30 MMOL/L — SIGNIFICANT CHANGE UP (ref 22–31)
CREAT SERPL-MCNC: 1.03 MG/DL — SIGNIFICANT CHANGE UP (ref 0.5–1.3)
GLUCOSE BLDC GLUCOMTR-MCNC: 121 MG/DL — HIGH (ref 70–99)
GLUCOSE BLDC GLUCOMTR-MCNC: 164 MG/DL — HIGH (ref 70–99)
GLUCOSE BLDC GLUCOMTR-MCNC: 165 MG/DL — HIGH (ref 70–99)
GLUCOSE SERPL-MCNC: 126 MG/DL — HIGH (ref 70–99)
HCT VFR BLD CALC: 31 % — LOW (ref 34.5–45)
HGB BLD-MCNC: 10.3 G/DL — LOW (ref 11.5–15.5)
INR BLD: 1.95 RATIO — HIGH (ref 0.88–1.16)
INR BLD: 1.97 RATIO — HIGH (ref 0.88–1.16)
MCHC RBC-ENTMCNC: 30 PG — SIGNIFICANT CHANGE UP (ref 27–34)
MCHC RBC-ENTMCNC: 33.2 GM/DL — SIGNIFICANT CHANGE UP (ref 32–36)
MCV RBC AUTO: 90.4 FL — SIGNIFICANT CHANGE UP (ref 80–100)
NRBC # BLD: 0 /100 WBCS — SIGNIFICANT CHANGE UP (ref 0–0)
PLATELET # BLD AUTO: 161 K/UL — SIGNIFICANT CHANGE UP (ref 150–400)
POTASSIUM SERPL-MCNC: 4 MMOL/L — SIGNIFICANT CHANGE UP (ref 3.5–5.3)
POTASSIUM SERPL-SCNC: 4 MMOL/L — SIGNIFICANT CHANGE UP (ref 3.5–5.3)
PROTHROM AB SERPL-ACNC: 21.4 SEC — HIGH (ref 9.8–12.7)
PROTHROM AB SERPL-ACNC: 21.6 SEC — HIGH (ref 9.8–12.7)
RBC # BLD: 3.43 M/UL — LOW (ref 3.8–5.2)
RBC # FLD: 13.3 % — SIGNIFICANT CHANGE UP (ref 10.3–14.5)
SODIUM SERPL-SCNC: 144 MMOL/L — SIGNIFICANT CHANGE UP (ref 135–145)
WBC # BLD: 5.41 K/UL — SIGNIFICANT CHANGE UP (ref 3.8–10.5)
WBC # FLD AUTO: 5.41 K/UL — SIGNIFICANT CHANGE UP (ref 3.8–10.5)

## 2018-08-07 PROCEDURE — 99233 SBSQ HOSP IP/OBS HIGH 50: CPT

## 2018-08-07 RX ORDER — WARFARIN SODIUM 2.5 MG/1
2.5 TABLET ORAL ONCE
Qty: 0 | Refills: 0 | Status: COMPLETED | OUTPATIENT
Start: 2018-08-07 | End: 2018-08-07

## 2018-08-07 RX ADMIN — Medication 25 MILLIGRAM(S): at 06:36

## 2018-08-07 RX ADMIN — WARFARIN SODIUM 7.5 MILLIGRAM(S): 2.5 TABLET ORAL at 21:42

## 2018-08-07 RX ADMIN — Medication 2: at 13:06

## 2018-08-07 RX ADMIN — Medication 81 MILLIGRAM(S): at 13:41

## 2018-08-07 RX ADMIN — INSULIN GLARGINE 20 UNIT(S): 100 INJECTION, SOLUTION SUBCUTANEOUS at 21:42

## 2018-08-07 RX ADMIN — Medication 88 MICROGRAM(S): at 06:36

## 2018-08-07 RX ADMIN — HEPARIN SODIUM 1300 UNIT(S)/HR: 5000 INJECTION INTRAVENOUS; SUBCUTANEOUS at 09:15

## 2018-08-07 RX ADMIN — WARFARIN SODIUM 2.5 MILLIGRAM(S): 2.5 TABLET ORAL at 13:41

## 2018-08-07 RX ADMIN — PREGABALIN 1000 MICROGRAM(S): 225 CAPSULE ORAL at 13:41

## 2018-08-07 RX ADMIN — Medication 1 TABLET(S): at 04:14

## 2018-08-07 RX ADMIN — Medication 1 SPRAY(S): at 17:07

## 2018-08-07 RX ADMIN — Medication 1 TABLET(S): at 13:41

## 2018-08-07 RX ADMIN — LOSARTAN POTASSIUM 25 MILLIGRAM(S): 100 TABLET, FILM COATED ORAL at 06:36

## 2018-08-07 RX ADMIN — Medication 1 SPRAY(S): at 09:10

## 2018-08-07 RX ADMIN — Medication 2000 UNIT(S): at 13:41

## 2018-08-07 NOTE — DIETITIAN INITIAL EVALUATION ADULT. - ENERGY NEEDS
Ht.  63    "        Wt.116        kg               BMI 45                 IBW   52.3    kg               Pt is at    221%  IBW  ABW    68kg

## 2018-08-07 NOTE — PROGRESS NOTE ADULT - ASSESSMENT
78 / F with PMHx HTN, DM2, hypothyroidism, chronic back pain, urolithiasis 9mm s/p stent then lithotripsy then  s/p stent s/p removal Dr. Alvarez, c/b laryngeal inflammation with altered voice admitted on 8/1/18 with c/o  sudden onset right lower chest pain/RUQ pain associated with inability to take a deep breath.    * Right sided Segmental Pulmonary Embolism  with acute hypoxic respiratory failure, improving   *  Provoked PE from being sedentary in the last 5 weeks.   * Right sided DVT   - CICU   - cont supportive O2  - cont heparin drip with coumadin , AC service consult   - pt and her daughter want to go with coumadin; so give 10 mg tonight and then 5 mg daily, titrate dose with response to INR. Daily INR  - Echo - EF 60% , LVH , mild AS, diastolic dysfunction, dilated RV with preserves function   - cardio/pulmo consults   - Leg venous dopplers - positive Right DVT   - INR daily  1.5 today --> 1.95, repeat stat now   - AC consult   - outpatient thrombophilia work-up  and TAMICA evaluation  - check O2 on ambulation - drops to 85% on ambulation, pt needs O2 supply at home   - check O2 sats mis in am on ambulation for d/c planning     * STEPHEN   - start IV fluids  - reduce dose of losartan 50--> 25   - repeat renal function in am - better     * Headaches r/o acute pathology, suspected sinusitis vs migraine  - CT head   - neg   - Ct sinuses - neg   - aleks puentes prn  - add flonase, nasal saline spray     *  HTN: overcontrolled   - noted low HR in 50th   - cont losartan 50--> 25   - lower dose of  metoprolol 25 bid--> toprol xl 25   -  hold HCTZ    *  Hypothyroidism: cont synthroid      *  DM 2:   - Hold oral hypoglycemics  - lantus + ISS  - Hemoglobin A1C, 6.7     *  Altered voice from intubation during ureteral stenting:  Speech therapist consult    * Mild anemia  - check iron studies, b12, folate - wnl   - likely dilutional     *  DVT PPX:  heparin drip, coumadin  time spend 40 min   daughter Nicolette updated over the phone   [phone]

## 2018-08-07 NOTE — PROGRESS NOTE ADULT - ASSESSMENT
- pulmonary embolism with segmental emboli in the right lower lobe branches.  - dvt of the right leg   - obesity   - suggestion of sleep apnea   - likely risk factor is recent immobility with  urological procedures .    PLAN     - continue with the heparin and coumadin and INR is still sub therapeutic   - over lap of 5 days until the INR is therapeutic.  - monitor hb closely .  - 02 supplementation for the hypoxia   - patient would benifit from the out patient  sleep studies   - monitor for the bleeding .   - check the sat on room air and with ambulation before discharge.   - symptomatic relief for the head ache and work up as per the hospitalist

## 2018-08-07 NOTE — PROGRESS NOTE ADULT - ASSESSMENT
A: 59 yo female with + Right lung PE with + heart strain and RLE DVT    Plan:  INR 1.95  :: cont  Coumadin 7.5mg PO (today is day #7/5) with heparin bridge x 5 day overlap  :: Daily cbc, bmp, INR  :: Oob as tolerated  ::Venodyne contraind    Will continue to follow. A: 57 yo female with + Right lung PE with + heart strain and RLE DVT    Plan:  INR 1.95  :: cont  Coumadin 7.5mg PO (today is day #7/5) with heparin bridge x 5 day overlap  :: Daily cbc, bmp, INR  :: Oob as tolerated  ::Venodyne contraindicated    Pt plans on f/u with dr Aguilar's office for out pt INR's    Will continue to follow.

## 2018-08-07 NOTE — DIETITIAN INITIAL EVALUATION ADULT. - OTHER INFO
Pt is 78 / F with PMHx HTN, DM2, hypothyroidism, chronic back pain, obesity, spinal stenosis, laryngeal inflammation with altered voice admitted on 8/1/18 with c/o  sudden onset right lower chest pain/RUQ pain associated with inability to take a deep breath.  Also, associated with SOB.  Pt has been mostly sedentary in the last one month or so as she had 3 surgeries back to back. Denies any leg pain.  SOB subsiding. Dx  pulmonary embolism, STEPHEN, DM 2, HTN. Pt is 78 / F with PMHx HTN, DM2, hypothyroidism, chronic back pain, obesity, spinal stenosis, laryngeal inflammation with altered voice admitted on 8/1/18 with c/o  sudden onset right lower chest pain/RUQ pain associated with inability to take a deep breath.  Also, associated with SOB.  Pt has been mostly sedentary in the last one month or so as she had 3 surgeries back to back. Denies any leg pain.  SOB subsiding. Dx  pulmonary embolism, STEPHEN, DM 2, HTN. Edema 1+   left and right leg, Luis Miguel 20, skin intact.  Pt on DASH/consistent carbohydrate diet.  A1c is 6.7.  Pt has good PO intake but is having issues being compliant with advice from urologist, who recommends no green vegetables until kidney stones are analyzed.  Pt also newly on coumadin.  Pt has trouble being compliant with Consistent Carb diet.  Suggest maintain current DASH/CC diet, pt educated on both diet, and on Coumadin and vit K.  Suggest add MVI w/minerals.  Will monitor PO intake, tolerance, labs and weight. Pt is 78 / F with PMHx HTN, DM2, hypothyroidism, chronic back pain, obesity, spinal stenosis, laryngeal inflammation with altered voice admitted on 8/1/18 with c/o  sudden onset right lower chest pain/RUQ pain associated with inability to take a deep breath.  Also, associated with SOB.  Pt has been mostly sedentary in the last one month or so as she had 3 surgeries back to back. Denies any leg pain.  SOB subsiding. Dx  pulmonary embolism, STEPHEN, DM 2, HTN. Edema 1+   left and right leg, Luis Miguel 20, skin intact.  Pt on DASH/consistent carbohydrate diet.  A1c is 6.7.  Pt has good PO intake but is having issues being compliant with advice from urologist, who recommends no green vegetables until kidney stones are analyzed.  Pt also newly on coumadin.  Pt has trouble being compliant with Consistent Carb diet.  Suggest maintain current DASH/CC diet, pt educated on both diet, and on Coumadin and vit K.  Suggest add MVI w/minerals. Daily weights.  Will monitor PO intake, tolerance, labs and weight.

## 2018-08-07 NOTE — DIETITIAN INITIAL EVALUATION ADULT. - ETIOLOGY
CERTIFICATE OF RETURN TO WORK    March 30, 2018      Re:   Park Scott  [address] [place] [id number]                        This is to certify that Park Scott was seen on 3/30/2018 and can can return to regular work on 4/1/2018.    RESTRICTIONS: none.        SIGNATURE:___________________________________________,   3/30/2018      Lisa Feliz PA-C           [address]  [address] [place] [address]  [phone]   increased PO intake 2/2 food and nutrition knowledge deficit, inconsistent carb intake

## 2018-08-08 ENCOUNTER — TRANSCRIPTION ENCOUNTER (OUTPATIENT)
Age: 78
End: 2018-08-08

## 2018-08-08 VITALS — TEMPERATURE: 97 F

## 2018-08-08 LAB
APTT BLD: 98 SEC — HIGH (ref 27.5–37.4)
GLUCOSE BLDC GLUCOMTR-MCNC: 120 MG/DL — HIGH (ref 70–99)
GLUCOSE BLDC GLUCOMTR-MCNC: 121 MG/DL — HIGH (ref 70–99)
GLUCOSE BLDC GLUCOMTR-MCNC: 164 MG/DL — HIGH (ref 70–99)
HCT VFR BLD CALC: 34.4 % — LOW (ref 34.5–45)
HGB BLD-MCNC: 11.5 G/DL — SIGNIFICANT CHANGE UP (ref 11.5–15.5)
INR BLD: 1.97 RATIO — HIGH (ref 0.88–1.16)
INR BLD: 2.09 RATIO — HIGH (ref 0.88–1.16)
MCHC RBC-ENTMCNC: 29.9 PG — SIGNIFICANT CHANGE UP (ref 27–34)
MCHC RBC-ENTMCNC: 33.4 GM/DL — SIGNIFICANT CHANGE UP (ref 32–36)
MCV RBC AUTO: 89.6 FL — SIGNIFICANT CHANGE UP (ref 80–100)
NRBC # BLD: 0 /100 WBCS — SIGNIFICANT CHANGE UP (ref 0–0)
PLATELET # BLD AUTO: 172 K/UL — SIGNIFICANT CHANGE UP (ref 150–400)
PROTHROM AB SERPL-ACNC: 21.6 SEC — HIGH (ref 9.8–12.7)
PROTHROM AB SERPL-ACNC: 22.9 SEC — HIGH (ref 9.8–12.7)
RBC # BLD: 3.84 M/UL — SIGNIFICANT CHANGE UP (ref 3.8–5.2)
RBC # FLD: 13.3 % — SIGNIFICANT CHANGE UP (ref 10.3–14.5)
WBC # BLD: 6.43 K/UL — SIGNIFICANT CHANGE UP (ref 3.8–10.5)
WBC # FLD AUTO: 6.43 K/UL — SIGNIFICANT CHANGE UP (ref 3.8–10.5)

## 2018-08-08 PROCEDURE — 99233 SBSQ HOSP IP/OBS HIGH 50: CPT

## 2018-08-08 RX ORDER — METOPROLOL TARTRATE 50 MG
1 TABLET ORAL
Qty: 30 | Refills: 0 | OUTPATIENT
Start: 2018-08-08 | End: 2018-09-06

## 2018-08-08 RX ORDER — LOSARTAN/HYDROCHLOROTHIAZIDE 100MG-25MG
1 TABLET ORAL
Qty: 0 | Refills: 0 | COMMUNITY

## 2018-08-08 RX ORDER — FLUTICASONE PROPIONATE 50 MCG
1 SPRAY, SUSPENSION NASAL
Qty: 1 | Refills: 0 | OUTPATIENT
Start: 2018-08-08 | End: 2018-09-06

## 2018-08-08 RX ORDER — ENOXAPARIN SODIUM 100 MG/ML
120 INJECTION SUBCUTANEOUS EVERY 12 HOURS
Qty: 0 | Refills: 0 | Status: DISCONTINUED | OUTPATIENT
Start: 2018-08-08 | End: 2018-08-08

## 2018-08-08 RX ORDER — SODIUM CHLORIDE 0.65 %
1 AEROSOL, SPRAY (ML) NASAL
Qty: 0 | Refills: 0 | COMMUNITY
Start: 2018-08-08

## 2018-08-08 RX ORDER — LOSARTAN POTASSIUM 100 MG/1
1 TABLET, FILM COATED ORAL
Qty: 30 | Refills: 0 | OUTPATIENT
Start: 2018-08-08 | End: 2018-09-06

## 2018-08-08 RX ORDER — METOPROLOL TARTRATE 50 MG
1 TABLET ORAL
Qty: 0 | Refills: 0 | COMMUNITY

## 2018-08-08 RX ADMIN — Medication 1 TABLET(S): at 07:30

## 2018-08-08 RX ADMIN — Medication 2: at 12:04

## 2018-08-08 RX ADMIN — PREGABALIN 1000 MICROGRAM(S): 225 CAPSULE ORAL at 12:04

## 2018-08-08 RX ADMIN — Medication 1 SPRAY(S): at 17:12

## 2018-08-08 RX ADMIN — Medication 81 MILLIGRAM(S): at 12:04

## 2018-08-08 RX ADMIN — Medication 1 TABLET(S): at 05:44

## 2018-08-08 RX ADMIN — Medication 2000 UNIT(S): at 12:04

## 2018-08-08 RX ADMIN — Medication 1 TABLET(S): at 12:04

## 2018-08-08 RX ADMIN — LOSARTAN POTASSIUM 25 MILLIGRAM(S): 100 TABLET, FILM COATED ORAL at 05:44

## 2018-08-08 RX ADMIN — ENOXAPARIN SODIUM 120 MILLIGRAM(S): 100 INJECTION SUBCUTANEOUS at 17:12

## 2018-08-08 RX ADMIN — Medication 25 MILLIGRAM(S): at 05:45

## 2018-08-08 RX ADMIN — Medication 88 MICROGRAM(S): at 05:44

## 2018-08-08 RX ADMIN — HEPARIN SODIUM 1300 UNIT(S)/HR: 5000 INJECTION INTRAVENOUS; SUBCUTANEOUS at 07:20

## 2018-08-08 RX ADMIN — Medication 1 SPRAY(S): at 05:45

## 2018-08-08 NOTE — DISCHARGE NOTE ADULT - PATIENT PORTAL LINK FT
You can access the [facility] Patient Portal, offered by [facility], by registering with the following website: [email or web address]

## 2018-08-08 NOTE — DISCHARGE NOTE ADULT - MEDICATION SUMMARY - MEDICATIONS TO TAKE
I will START or STAY ON the medications listed below when I get home from the hospital:    aspirin 81 mg oral delayed release tablet  -- 1 tab(s) by mouth once a day  -- Indication: For home meds    butalbital/acetaminophen/caffeine 50 mg-325 mg-40 mg oral tablet  -- 1 tab(s) by mouth every 12 hours, As Needed -migraine MDD:100   -- Indication: For headache    losartan 25 mg oral tablet  -- 1 tab(s) by mouth once a day  -- Indication: For home meds    warfarin 7.5 mg oral tablet  -- 1 tab(s) by mouth once a day   -- Do not take this drug if you are pregnant.  It is very important that you take or use this exactly as directed.  Do not skip doses or discontinue unless directed by your doctor.  Obtain medical advice before taking any non-prescription drugs as some may affect the action of this medication.    -- Indication: For PULMONARY EMBOLI    glimepiride 2 mg oral tablet  -- 2 tab(s) by mouth 2 times a day  -- Indication: For home meds    Kombiglyze XR  -- 1 dose(s) by mouth once a day  -- Indication: For home meds    metoprolol succinate 25 mg oral tablet, extended release  -- 1 tab(s) by mouth once a day  -- Indication: For HTN    fluticasone 50 mcg/inh nasal spray  -- 1 spray(s) into nose 2 times a day  -- Indication: For sinusitis    sodium chloride 0.65% nasal spray  -- 1 spray(s) into nose prn, As Needed  -- Indication: For sinusitis    levothyroxine 88 mcg (0.088 mg) oral tablet  -- 1 tab(s) by mouth once a day  -- Indication: For home meds    PreserVision oral tablet  -- 1 tab(s) by mouth 2 times a day  -- Indication: For home meds    Calcium 600+D Plus Minerals oral tablet, chewable  -- 1 tab(s) by mouth once a day  -- Indication: For home meds    Vitamin D3 1000 intl units oral tablet  -- 2 tab(s) by mouth once a day  -- Indication: For home meds    Vitamin B12 1000 mcg oral tablet  -- 1 tab(s) by mouth once a day  -- Indication: For home meds

## 2018-08-08 NOTE — DISCHARGE NOTE ADULT - CARE PROVIDERS DIRECT ADDRESSES
[email or web address]

## 2018-08-08 NOTE — DISCHARGE NOTE ADULT - VISION (WITH CORRECTIVE LENSES IF THE PATIENT USUALLY WEARS THEM):
hx of cataract surgery/Normal vision: sees adequately in most situations; can see medication labels, newsprint

## 2018-08-08 NOTE — DISCHARGE NOTE ADULT - PLAN OF CARE
prevent recurrence take coumadin 7.5 mg, check INR in 2 days at Dr. Aguilar office, adjust dose as needed, follow up with PCP within 1 week, advised hypercoagulability work-up as outpatient to rule out underlying  disorders  follow up with pulmonology within 1 week lower dose of losartan, and toprol , check BP with PCP within 1 week consider sleep studies as outpatient to exclude Obstructive sleep apnea continue coumadin , keep INR between 2-3 , follow up with PCP within 2-3 days

## 2018-08-08 NOTE — PROGRESS NOTE ADULT - SUBJECTIVE AND OBJECTIVE BOX
ANTELMO KNAPP  MRN: [id number]    S: 8/4/2018: No complaints. Sitting up in chair. Denies chest pain or shortness of breath.     PAST MEDICAL & SURGICAL HISTORY:  Hypovitaminosis D  Renal cyst  Hypercholesterolemia  Spinal stenosis  Obesity  Poliomyelitis  Arytenoid finding: left arytenoid and vocal fold hematoma secondary to difficult intubation  Chronic back pain  Hypothyroidism  DM (diabetes mellitus)  Hypertension  History of tonsillectomy  Bartholin cyst  Ectopic pregnancy  Abscess of breast: drainage of abscess left breast  S/P shoulder replacement, left: partial  Encounter for removal of ureteral stent  S/P cholecystectomy      O: T(C): 36.2 (08-04-18 @ 06:30), Max: 36.2 (08-04-18 @ 06:30)  HR: 56 (08-04-18 @ 08:00) (51 - 67)  BP: 120/45 (08-04-18 @ 08:00) (106/45 - 130/47)  RR: 13 (08-04-18 @ 08:00) (13 - 18)  SpO2: 100% (08-04-18 @ 04:00) (91% - 100%)  Wt(kg): --    PHYSICAL EXAM:      GENERAL: comfortable    NEURO: awake/alert    NECK: no JVD    CHEST: clear    CARDIAC: RR    EXT: no edema      LABS:                         10.6   5.94  )-----------( 163      ( 04 Aug 2018 05:21 )             31.6       08-04    141  |  105  |  28<H>  ----------------------------<  117<H>  3.9   |  29  |  1.16    Ca    8.6      04 Aug 2018 05:21    RADIOLOGY:      EXAM:  US DPLX LWR EXT VEINS COMPL BI                        PROCEDURE DATE:  08/01/2018          INTERPRETATION:  Clinical information: Pulmonary embolism. Shortness of   breath.    COMPARISON: None available.    TECHNIQUE: Duplex sonography of the bilateral lower extremities with   color and spectral Doppler, with and without compression.      FINDINGS:    Right lower extremity: There is thrombus in the right soleal and   posterior tibial veins as well as the distal popliteal vein. There is   normal compressibility of the  common femoral, femoral and popliteal   veins.      Left lower extremity: There is normal compressibility of the  common   femoral, femoral and popliteal veins. No calf vein thrombosis is detected.    There are bilateral popliteal cysts, measuring 4.0 x 1.0 x 2.0 cm on the   right and 5.0 x 2.1 cm on the left.    IMPRESSION:     Right lower extremity below the knee deep vein thrombosis.    Findings discussed with Dr. Mcdermott on August 01, 2018, 3:45 PM.    LILLIANA MOCTEZUMA         EXAM:  CT ABDOMEN AND PELVIS IC                        EXAM:  CT CHEST IC                          PROCEDURE DATE:  08/01/2018      INTERPRETATION:  CLINICAL INFORMATION: Right sided pain, shortness of   breath    COMPARISON: CT abdomen/pelvis 6/22/2018    PROCEDURE:   CT of the Chest, Abdomen and Pelvis was performed with intravenous   contrast.   Intravenous contrast: 90 ml Omnipaque 350. 10 ml discarded.  Oral contrast: None.  Sagittal and coronal reformats were performed.  FINDINGS:    CHEST:     LUNGS, LARGE AIRWAYS, PLEURA: Patent central airways. No consolidation or   pneumothorax.  No pleural effusion. Bibasilar linear atelectasis  VESSELS: Atherosclerosis. There are filling defects along the right   segmental pulmonary arteries consistent with pulmonary emboli. There may   be an additional filling defect along the left lower lobe basal segment.   No saddle embolus. The main pulmonary artery measures 3.1 cm. Suggestion   of slight bowing of the interventricular septum tothe left  HEART: Heart size is normal. No pericardial effusion. Coronary   atherosclerosis  MEDIASTINUM AND SHANNAN: No lymphadenopathy.  CHEST WALL AND LOWER NECK: Within normal limits.    ABDOMEN AND PELVIS:    LIVER: Within normal limits.  GALLBLADDER: Cholecystectomy  SPLEEN: Within normal limits.  PANCREAS: Within normal limits.  ADRENALS: Within normal limits.  KIDNEYS/URETERS: Right renal cyst again noted. The previously seen   calculus within the left renal pelvis has passed and there is no longer   left-sided hydronephrosis or perinephric stranding. Left-sided lesions   again noted, too small to characterize. Presumed left parapelvic cysts.   The left kidney is atrophic.    BLADDER: Within normal limits.  REPRODUCTIVE ORGANS: Unremarkable    BOWEL: Limited without oral contrast. No evidence of bowel obstruction.   Normal appendix. Extensive diverticula without evidence of diverticulitis.  PERITONEUM: No ascites.  VESSELS:  Atherosclerosis  RETROPERITONEUM: No lymphadenopathy.    ABDOMINAL WALL: Tiny fat-containing umbilical hernia  BONES: Degenerative changes    IMPRESSION:    Right-sided segmental pulmonary emboli. Suggestion of bowing of the   interventricular septum to the left suggesting right heart strain.   Correlate with echo.    Discussed with Dr. Wilhelm at 1:40 AM    FERNANDO PARK         EXAM:  2D ECHOCARDIOGRAM AD      PROCEDURE DATE:  08/01/2018        INTERPRETATION:  Transthoracic Echocardiography Report (TTE)     Demographics     Patient name        ARIA VALADEZ    Age           78 year(s)     Med Rec #           [id number]         Gender        Female     Account #           [id number]           Date of Birth 1940     Interpreting        Connor Henderson     Room Number   0020   Physician     Referring Physician Winifred Mcdermott     Sonographer   Gill Martínez                                                   [facility]     Date of study       08/01/2018 09:08                       AM     Height              62.99 in          Weight        253.53 pounds    Type of Study:     TTE procedure: 2D echocardiogram AD     BP: 130/65 mmHg     [facility]    Indications   1) R07.9 - Chest pain    M-Mode Measurements (cm)     LVEDd: 5.85 cm            LVESd: 3.66 cm   IVSEd: 1.29 cm   LVPWd: 1.24 cm            AO Root Dimension: 3.5 cm                             ACS: 2.2 cm                             LA: 3.9 cm    Doppler Measurements:                                   MV Peak E-Wave: 79.5 cm/s   TR Velocity:265 cm/s          MV Peak A-Wave: 116 cm/s   TR Gradient:28.09 mmHg        MV E/A Ratio: 0.69 %   Estimated RAP:10 mmHg         MV Peak Gradient: 2.53 mmHg   RVSP:33 mmHg     Findings     Mitral Valve   Normal appearing mitral valve structure and function.   EA reversal of the mitral inflow consistent with reduced compliance of   the   left ventricle.   Mild (1+) mitral regurgitation is present.   Mild mitral annular calcification is present.     Aortic Valve   Mild aortic sclerosis is present with normal valvular opening.     Tricuspid Valve   Normal appearing tricuspid valve structure and function.   Mild (1+) tricuspid valve regurgitation is present.     Pulmonic Valve   Normal appearing pulmonic valve structure and function.     Left Atrium   Normal appearing left atrium.     Left Ventricle   The left ventricle is normal in size, wall motion and contractility.   Mild concentric left ventricular hypertrophy is present.   Estimated left ventricular ejection fraction is 60-65 %.     Right Atrium   The right atrium appears mildly dilated.     Right Ventricle   The right ventricle is mildly dilated, with grossly preserved function     Pericardial Effusion   No evidence of pericardial effusion.     Pleural Effusion   No evidence of pleural effusion.     Miscellaneous   All visualized extra cardiac structures appears to be normal.     Impression     Summary     The left ventricle is normal in size, wall motion and contractility.   Mild concentric left ventricular hypertrophy is present.   Estimated left ventricular ejection fraction is 60-65 %.   The right ventricle is mildly dilated, with grossly preserved function   Mild aortic sclerosis is present with normal valvular opening.   Normal appearing mitral valve structure and function.   EA reversal of the mitral inflow consistent with reduced compliance of   the   left ventricle.   Mild (1+) mitral regurgitation is present.   Mild mitral annular calcification is present.     Signature     ----------------------------------------------------------------   Electronically signed by Connor Henderson(Interpreting physician)   on 08/01/2018 09:57 AM   ----------------------------------------------------------------    Valves     Mitral Valve     Peak E-Wave: 79.5 cm/s   Peak A-Wave: 116 cm/s   Peak Gradient: 2.53 mmHg                            E/A Ratio: 0.69     Aortic Valve     Cusp Separation: 2.2 cm     Tricuspid Valve     TR Velocity: 265 cm/s                Estimated RAP: 10 mmHg   TR Gradient: 28.09 mmHg              Estimated RVSP: 33 mmHg     Pulmonic Valve              Estimated PASP: 38.09 mmHg    Structures     Left Atrium     LA Dimension: 3.9 cm          LA Area: 14.2 cm^2   LA/Aorta: 1.11                LA Volume/Index: 32 ml /15m^2     Left Ventricle     Diastolic Dimension: 5.85 cm          Systolic Dimension: 3.66 cm   Septum Diastolic: 1.29 cm   PW Diastolic: 1.24 cm     FS: 37.44 %     Right Atrium     RA Systolic Pressure: 10 mmHg     Right Ventricle              RV Systolic Pressure: 38.09 mmHg     Miscellaneous     Aorta     Aortic Root: 3.5 cm        CONNOR HENDERSON M.D., ATTENDING CARDIOLOGIST      MEDICATIONS  (STANDING):  aspirin enteric coated 81 milliGRAM(s) Oral daily  cholecalciferol 2000 Unit(s) Oral daily  cyanocobalamin 1000 MICROGram(s) Oral daily  dextrose 5%. 1000 milliLiter(s) (50 mL/Hr) IV Continuous <Continuous>  dextrose 50% Injectable 12.5 Gram(s) IV Push once  dextrose 50% Injectable 25 Gram(s) IV Push once  dextrose 50% Injectable 25 Gram(s) IV Push once  fluticasone propionate 50 MICROgram(s)/spray Nasal Spray 1 Spray(s) Both Nostrils two times a day  heparin  Infusion.  Unit(s)/Hr (21 mL/Hr) IV Continuous <Continuous>  insulin glargine Injectable (LANTUS) 20 Unit(s) SubCutaneous at bedtime  insulin lispro (HumaLOG) corrective regimen sliding scale   SubCutaneous three times a day before meals  insulin lispro (HumaLOG) corrective regimen sliding scale   SubCutaneous at bedtime  levothyroxine 88 MICROGram(s) Oral daily  losartan 25 milliGRAM(s) Oral daily  metoprolol tartrate 25 milliGRAM(s) Oral two times a day  multivitamin/minerals 1 Tablet(s) Oral daily  sodium chloride 0.9%. 1000 milliLiter(s) (50 mL/Hr) IV Continuous <Continuous>  warfarin 10 milliGRAM(s) Oral once    MEDICATIONS  (PRN):  acetaminophen   Tablet. 650 milliGRAM(s) Oral every 6 hours PRN Mild Pain (1 - 3)  acetaminophen 325 mG/butalbital 50 mG/caffeine 40 mG 1 Tablet(s) Oral every 6 hours PRN migraine  dextrose 40% Gel 15 Gram(s) Oral once PRN Blood Glucose LESS THAN 70 milliGRAM(s)/deciliter  glucagon  Injectable 1 milliGRAM(s) IntraMuscular once PRN Glucose LESS THAN 70 milligrams/deciliter  heparin  Injectable 9500 Unit(s) IV Push every 6 hours PRN For aPTT less than 40  heparin  Injectable 4500 Unit(s) IV Push every 6 hours PRN For aPTT between 40 - 57  ondansetron Injectable 4 milliGRAM(s) IV Push every 6 hours PRN Nausea and/or Vomiting  sodium chloride 0.65% Nasal 1 Spray(s) Both Nostrils every 2 hours PRN Nasal Congestion        A/P: 1. Acute pulmonary embolism.     2. Right leg DVT.    3. Hypoxemia on RA.     4. ? TAMICA.    5. Obesity.     6. Reduced compliance of L.V.; dilated RV with preserved function.     PLAN: Continue heparin with transition to coumadin; INR still subtherapeutic. Continue O2 prn. Check O2 sat on RA at rest and with ambulation prior to discharge to assess for home O2. Consider outpatient eval for thrombophilia with hematology although the patient has a possible risk factor (obesity with some relative immobilization following tx for kidney stones). Consider outpatient eval for TAMICA.
ANTELMO KNAPP  MRN: [id number]    S: 8/2/2018: Chart reviewed. Patient presently siting up in chair breathing comfortably; mild right chest discomfort on deep inspiration. O2 sat acceptable on nasal cannula O2 with desaturation to @ 87 % on RA.    PAST MEDICAL & SURGICAL HISTORY:  Hypovitaminosis D  Renal cyst  Hypercholesterolemia  Spinal stenosis  Obesity  Poliomyelitis  Arytenoid finding: left arytenoid and vocal fold hematoma secondary to difficult intubation  Chronic back pain  Hypothyroidism  DM (diabetes mellitus)  Hypertension  History of tonsillectomy  Bartholin cyst  Ectopic pregnancy  Abscess of breast: drainage of abscess left breast  S/P shoulder replacement, left: partial  Encounter for removal of ureteral stent  S/P cholecystectomy      O: T(C): 36.3 (08-02-18 @ 09:23), Max: 36.8 (08-01-18 @ 23:05)  HR: 63 (08-02-18 @ 04:33) (54 - 77)  BP: 122/45 (08-02-18 @ 04:33) (98/62 - 149/50)  RR: 17 (08-01-18 @ 10:18) (17 - 20)  SpO2: 99% (08-02-18 @ 04:33) (94% - 100%)  Wt(kg): --    PHYSICAL EXAM:      GENERAL: comfortable; obese    NEURO: awake and alert    NECK: supple    CHEST: clear    CARDIAC: RR     EXT: no edema      LABS:    Prothrombin Time and INR, Plasma in AM (08.02.18 @ 04:34)    Prothrombin Time, Plasma: 12.0 sec    INR: 1.11 ratio    Activated Partial Thromboplastin Time (08.02.18 @ 02:23)    Activated Partial Thromboplastin Time: 95.2: The recommended therapeutic heparin range (full dose) is 58-99 seconds.                              10.4   6.52  )-----------( 169      ( 02 Aug 2018 04:34 )             32.0       08-02    138  |  106  |  16  ----------------------------<  105<H>  3.5   |  29  |  1.09    Ca    8.3<L>      02 Aug 2018 04:34    TPro  6.9  /  Alb  3.5  /  TBili  0.4  /  DBili  x   /  AST  13<L>  /  ALT  22  /  AlkPhos  98  07-31    RADIOLOGY:      EXAM:  US DPLX LWR EXT VEINS COMPL BI                        PROCEDURE DATE:  08/01/2018          INTERPRETATION:  Clinical information: Pulmonary embolism. Shortness of   breath.    COMPARISON: None available.    TECHNIQUE: Duplex sonography of the bilateral lower extremities with   color and spectral Doppler, with and without compression.      FINDINGS:    Right lower extremity: There is thrombus in the right soleal and   posterior tibial veins as well as the distal popliteal vein. There is   normal compressibility of the  common femoral, femoral and popliteal   veins.      Left lower extremity: There is normal compressibility of the  common   femoral, femoral and popliteal veins. No calf vein thrombosis is detected.    There are bilateral popliteal cysts, measuring 4.0 x 1.0 x 2.0 cm on the   right and 5.0 x 2.1 cm on the left.    IMPRESSION:     Right lower extremity below the knee deep vein thrombosis.    Findings discussed with Dr. Mcdermott on August 01, 2018, 3:45 PM.    LILLIANA MOCTEZUMA         EXAM:  CT ABDOMEN AND PELVIS IC                        EXAM:  CT CHEST IC                          PROCEDURE DATE:  08/01/2018      INTERPRETATION:  CLINICAL INFORMATION: Right sided pain, shortness of   breath    COMPARISON: CT abdomen/pelvis 6/22/2018    PROCEDURE:   CT of the Chest, Abdomen and Pelvis was performed with intravenous   contrast.   Intravenous contrast: 90 ml Omnipaque 350. 10 ml discarded.  Oral contrast: None.  Sagittal and coronal reformats were performed.  FINDINGS:    CHEST:     LUNGS, LARGE AIRWAYS, PLEURA: Patent central airways. No consolidation or   pneumothorax.  No pleural effusion. Bibasilar linear atelectasis  VESSELS: Atherosclerosis. There are filling defects along the right   segmental pulmonary arteries consistent with pulmonary emboli. There may   be an additional filling defect along the left lower lobe basal segment.   No saddle embolus. The main pulmonary artery measures 3.1 cm. Suggestion   of slight bowing of the interventricular septum tothe left  HEART: Heart size is normal. No pericardial effusion. Coronary   atherosclerosis  MEDIASTINUM AND SHANNAN: No lymphadenopathy.  CHEST WALL AND LOWER NECK: Within normal limits.    ABDOMEN AND PELVIS:    LIVER: Within normal limits.  GALLBLADDER: Cholecystectomy  SPLEEN: Within normal limits.  PANCREAS: Within normal limits.  ADRENALS: Within normal limits.  KIDNEYS/URETERS: Right renal cyst again noted. The previously seen   calculus within the left renal pelvis has passed and there is no longer   left-sided hydronephrosis or perinephric stranding. Left-sided lesions   again noted, too small to characterize. Presumed left parapelvic cysts.   The left kidney is atrophic.    BLADDER: Within normal limits.  REPRODUCTIVE ORGANS: Unremarkable    BOWEL: Limited without oral contrast. No evidence of bowel obstruction.   Normal appendix. Extensive diverticula without evidence of diverticulitis.  PERITONEUM: No ascites.  VESSELS:  Atherosclerosis  RETROPERITONEUM: No lymphadenopathy.    ABDOMINAL WALL: Tiny fat-containing umbilical hernia  BONES: Degenerative changes    IMPRESSION:    Right-sided segmental pulmonary emboli. Suggestion of bowing of the   interventricular septum to the left suggesting right heart strain.   Correlate with echo.    Discussed with Dr. Wilhelm at 1:40 AM    FERNANDO PARK         EXAM:  2D ECHOCARDIOGRAM AD      PROCEDURE DATE:  08/01/2018        INTERPRETATION:  Transthoracic Echocardiography Report (TTE)     Demographics     Patient name        ARIA VALADEZ    Age           78 year(s)     Med Rec #           [id number]         Gender        Female     Account #           [id number]           Date of Birth 1940     Interpreting        Connor Henderson     Room Number   0020   Physician     Referring Physician Winifred Mcdermott     Sonographer   Gill Martínez                                                   [place]     Date of study       08/01/2018 09:08                       AM     Height              62.99 in          Weight        253.53 pounds    Type of Study:     TTE procedure: 2D echocardiogram AD     BP: 130/65 mmHg     Study Location: [facility] Quality: Technically Difficullt    Indications   1) R07.9 - Chest pain    M-Mode Measurements (cm)     LVEDd: 5.85 cm            LVESd: 3.66 cm   IVSEd: 1.29 cm   LVPWd: 1.24 cm            AO Root Dimension: 3.5 cm                             ACS: 2.2 cm                             LA: 3.9 cm    Doppler Measurements:                                   MV Peak E-Wave: 79.5 cm/s   TR Velocity:265 cm/s          MV Peak A-Wave: 116 cm/s   TR Gradient:28.09 mmHg        MV E/A Ratio: 0.69 %   Estimated RAP:10 mmHg         MV Peak Gradient: 2.53 mmHg   RVSP:33 mmHg     Findings     Mitral Valve   Normal appearing mitral valve structure and function.   EA reversal of the mitral inflow consistent with reduced compliance of   the   left ventricle.   Mild (1+) mitral regurgitation is present.   Mild mitral annular calcification is present.     Aortic Valve   Mild aortic sclerosis is present with normal valvular opening.     Tricuspid Valve   Normal appearing tricuspid valve structure and function.   Mild (1+) tricuspid valve regurgitation is present.     Pulmonic Valve   Normal appearing pulmonic valve structure and function.     Left Atrium   Normal appearing left atrium.     Left Ventricle   The left ventricle is normal in size, wall motion and contractility.   Mild concentric left ventricular hypertrophy is present.   Estimated left ventricular ejection fraction is 60-65 %.     Right Atrium   The right atrium appears mildly dilated.     Right Ventricle   The right ventricle is mildly dilated, with grossly preserved function     Pericardial Effusion   No evidence of pericardial effusion.     Pleural Effusion   No evidence of pleural effusion.     Miscellaneous   All visualized extra cardiac structures appears to be normal.     Impression     Summary     The left ventricle is normal in size, wall motion and contractility.   Mild concentric left ventricular hypertrophy is present.   Estimated left ventricular ejection fraction is 60-65 %.   The right ventricle is mildly dilated, with grossly preserved function   Mild aortic sclerosis is present with normal valvular opening.   Normal appearing mitral valve structure and function.   EA reversal of the mitral inflow consistent with reduced compliance of   the   left ventricle.   Mild (1+) mitral regurgitation is present.   Mild mitral annular calcification is present.     Signature     ----------------------------------------------------------------   Electronically signed by Thalia HendersonInterpreting physician)   on 08/01/2018 09:57 AM   ----------------------------------------------------------------    Valves     Mitral Valve     Peak E-Wave: 79.5 cm/s   Peak A-Wave: 116 cm/s   Peak Gradient: 2.53 mmHg                            E/A Ratio: 0.69     Aortic Valve     Cusp Separation: 2.2 cm     Tricuspid Valve     TR Velocity: 265 cm/s                Estimated RAP: 10 mmHg   TR Gradient: 28.09 mmHg              Estimated RVSP: 33 mmHg     Pulmonic Valve              Estimated PASP: 38.09 mmHg    Structures     Left Atrium     LA Dimension: 3.9 cm          LA Area: 14.2 cm^2   LA/Aorta: 1.11                LA Volume/Index: 32 ml /15m^2     Left Ventricle     Diastolic Dimension: 5.85 cm          Systolic Dimension: 3.66 cm   Septum Diastolic: 1.29 cm   PW Diastolic: 1.24 cm     FS: 37.44 %     Right Atrium     RA Systolic Pressure: 10 mmHg     Right Ventricle              RV Systolic Pressure: 38.09 mmHg     Miscellaneous     Aorta     Aortic Root: 3.5 cm        CONNOR HENDERSON M.D., ATTENDING CARDIOLOGIST            MEDICATIONS  (STANDING):  aspirin enteric coated 81 milliGRAM(s) Oral daily  cholecalciferol 2000 Unit(s) Oral daily  cyanocobalamin 1000 MICROGram(s) Oral daily  dextrose 5%. 1000 milliLiter(s) (50 mL/Hr) IV Continuous <Continuous>  dextrose 50% Injectable 12.5 Gram(s) IV Push once  dextrose 50% Injectable 25 Gram(s) IV Push once  dextrose 50% Injectable 25 Gram(s) IV Push once  heparin  Infusion.  Unit(s)/Hr (21 mL/Hr) IV Continuous <Continuous>  hydrochlorothiazide 12.5 milliGRAM(s) Oral daily  insulin glargine Injectable (LANTUS) 20 Unit(s) SubCutaneous at bedtime  insulin lispro (HumaLOG) corrective regimen sliding scale   SubCutaneous three times a day before meals  insulin lispro (HumaLOG) corrective regimen sliding scale   SubCutaneous at bedtime  levothyroxine 88 MICROGram(s) Oral daily  losartan 100 milliGRAM(s) Oral daily  metoprolol tartrate 50 milliGRAM(s) Oral every 12 hours  multivitamin/minerals 1 Tablet(s) Oral daily  warfarin 5 milliGRAM(s) Oral daily    MEDICATIONS  (PRN):  acetaminophen   Tablet. 650 milliGRAM(s) Oral every 6 hours PRN Mild Pain (1 - 3)  dextrose 40% Gel 15 Gram(s) Oral once PRN Blood Glucose LESS THAN 70 milliGRAM(s)/deciliter  glucagon  Injectable 1 milliGRAM(s) IntraMuscular once PRN Glucose LESS THAN 70 milligrams/deciliter  heparin  Injectable 9500 Unit(s) IV Push every 6 hours PRN For aPTT less than 40  heparin  Injectable 4500 Unit(s) IV Push every 6 hours PRN For aPTT between 40 - 57  ondansetron Injectable 4 milliGRAM(s) IV Push every 6 hours PRN Nausea and/or Vomiting        A/P: 1. Acute pulmonary embolism.     2. Right leg DVT.    3. Hypoxemia on RA.     4. ? TAMICA.    5. Obesity.     6. Reduced compliance of L.V.; dilated RV with preserved function.     PLAN: Continue heparin with transition to coumadin. Continue O2 sat. Check O2 sat on RA at rest and with ambulation prior to discharge to assess for home O2. Consider outpatient eval for thrombophilia with hematology although the patient has a possible risk factor (obesity with some relative immobilization following tx for kidney stones). Consider outpatient eval for TAMICA.
ANTELMO KNAPP  MRN: [id number]    S: 8/4/2018: No complaints. Sitting up in chair. Denies chest pain or shortness of breath.    8/5/18: Comfortable sitting up in chair. Denies chest pain, cough, or shortness of breath. O2 sat decreased to 85% while walking in hallway.     PAST MEDICAL & SURGICAL HISTORY:  Hypovitaminosis D  Renal cyst  Hypercholesterolemia  Spinal stenosis  Obesity  Poliomyelitis  Arytenoid finding: left arytenoid and vocal fold hematoma secondary to difficult intubation  Chronic back pain  Hypothyroidism  DM (diabetes mellitus)  Hypertension  History of tonsillectomy  Bartholin cyst  Ectopic pregnancy  Abscess of breast: drainage of abscess left breast  S/P shoulder replacement, left: partial  Encounter for removal of ureteral stent  S/P cholecystectomy      O: T(C): 36.1 (08-05-18 @ 06:23), Max: 36.4 (08-04-18 @ 17:22)  HR: 51 (08-05-18 @ 08:00) (51 - 78)  BP: 140/63 (08-05-18 @ 08:00) (119/40 - 146/50)  RR: 15 (08-05-18 @ 08:00) (12 - 18)  SpO2: 99% (08-05-18 @ 08:00) (92% - 100%)  Wt(kg): --    PHYSICAL EXAM:      GENERAL: comfortable    NEURO: awake/alert oriented x 3.     NECK: no JVD    CHEST: clear    CARDIAC: RR    EXT: no edema      LABS:    INR: 1.58 ratio (08.05.18 @ 05:25)    Activated Partial Thromboplastin Time: 75.8: The recommended therapeutic heparin range (full dose) is 58-99 seconds.                        10.6   5.63  )-----------( 161      ( 05 Aug 2018 05:25 )             32.7       08-05    145  |  108  |  19  ----------------------------<  121<H>  4.5   |  31  |  0.97    Ca    8.4<L>      05 Aug 2018 05:25    RADIOLOGY:      EXAM:  US DPLX LWR EXT VEINS COMPL BI                        PROCEDURE DATE:  08/01/2018          INTERPRETATION:  Clinical information: Pulmonary embolism. Shortness of   breath.    COMPARISON: None available.    TECHNIQUE: Duplex sonography of the bilateral lower extremities with   color and spectral Doppler, with and without compression.      FINDINGS:    Right lower extremity: There is thrombus in the right soleal and   posterior tibial veins as well as the distal popliteal vein. There is   normal compressibility of the  common femoral, femoral and popliteal   veins.      Left lower extremity: There is normal compressibility of the  common   femoral, femoral and popliteal veins. No calf vein thrombosis is detected.    There are bilateral popliteal cysts, measuring 4.0 x 1.0 x 2.0 cm on the   right and 5.0 x 2.1 cm on the left.    IMPRESSION:     Right lower extremity below the knee deep vein thrombosis.    Findings discussed with Dr. Mcdermott on August 01, 2018, 3:45 PM.    LILLIANA MOCTEZUMA         EXAM:  CT ABDOMEN AND PELVIS IC                        EXAM:  CT CHEST IC                          PROCEDURE DATE:  08/01/2018      INTERPRETATION:  CLINICAL INFORMATION: Right sided pain, shortness of   breath    COMPARISON: CT abdomen/pelvis 6/22/2018    PROCEDURE:   CT of the Chest, Abdomen and Pelvis was performed with intravenous   contrast.   Intravenous contrast: 90 ml Omnipaque 350. 10 ml discarded.  Oral contrast: None.  Sagittal and coronal reformats were performed.  FINDINGS:    CHEST:     LUNGS, LARGE AIRWAYS, PLEURA: Patent central airways. No consolidation or   pneumothorax.  No pleural effusion. Bibasilar linear atelectasis  VESSELS: Atherosclerosis. There are filling defects along the right   segmental pulmonary arteries consistent with pulmonary emboli. There may   be an additional filling defect along the left lower lobe basal segment.   No saddle embolus. The main pulmonary artery measures 3.1 cm. Suggestion   of slight bowing of the interventricular septum tothe left  HEART: Heart size is normal. No pericardial effusion. Coronary   atherosclerosis  MEDIASTINUM AND SHANNAN: No lymphadenopathy.  CHEST WALL AND LOWER NECK: Within normal limits.    ABDOMEN AND PELVIS:    LIVER: Within normal limits.  GALLBLADDER: Cholecystectomy  SPLEEN: Within normal limits.  PANCREAS: Within normal limits.  ADRENALS: Within normal limits.  KIDNEYS/URETERS: Right renal cyst again noted. The previously seen   calculus within the left renal pelvis has passed and there is no longer   left-sided hydronephrosis or perinephric stranding. Left-sided lesions   again noted, too small to characterize. Presumed left parapelvic cysts.   The left kidney is atrophic.    BLADDER: Within normal limits.  REPRODUCTIVE ORGANS: Unremarkable    BOWEL: Limited without oral contrast. No evidence of bowel obstruction.   Normal appendix. Extensive diverticula without evidence of diverticulitis.  PERITONEUM: No ascites.  VESSELS:  Atherosclerosis  RETROPERITONEUM: No lymphadenopathy.    ABDOMINAL WALL: Tiny fat-containing umbilical hernia  BONES: Degenerative changes    IMPRESSION:    Right-sided segmental pulmonary emboli. Suggestion of bowing of the   interventricular septum to the left suggesting right heart strain.   Correlate with echo.    Discussed with Dr. Wilhelm at 1:40 AM    [facility]         EXAM:  2D ECHOCARDIOGRAM AD      PROCEDURE DATE:  08/01/2018        INTERPRETATION:  Transthoracic Echocardiography Report (TTE)     Demographics     Patient name        ARIA VALADEZ    Age           78 year(s)     Med Rec #           [id number]         Gender        Female     Account #           [id number]           Date of Birth 1940     Interpreting        Connor Henderson     Room Number   0020   Physician     Referring Physician Winifred Mcdermott     Sonographer   Gill Martínez RDCS     Date of study       08/01/2018 09:08                       AM     Height              62.99 in          Weight        253.53 pounds    Type of Study:     TTE procedure: 2D echocardiogram AD     BP: 130/65 mmHg     [facility]    Indications   1) R07.9 - Chest pain    M-Mode Measurements (cm)     LVEDd: 5.85 cm            LVESd: 3.66 cm   IVSEd: 1.29 cm   LVPWd: 1.24 cm            AO Root Dimension: 3.5 cm                             ACS: 2.2 cm                             LA: 3.9 cm    Doppler Measurements:                                   MV Peak E-Wave: 79.5 cm/s   TR Velocity:265 cm/s          MV Peak A-Wave: 116 cm/s   TR Gradient:28.09 mmHg        MV E/A Ratio: 0.69 %   Estimated RAP:10 mmHg         MV Peak Gradient: 2.53 mmHg   RVSP:33 mmHg     Findings     Mitral Valve   Normal appearing mitral valve structure and function.   EA reversal of the mitral inflow consistent with reduced compliance of   the   left ventricle.   Mild (1+) mitral regurgitation is present.   Mild mitral annular calcification is present.     Aortic Valve   Mild aortic sclerosis is present with normal valvular opening.     Tricuspid Valve   Normal appearing tricuspid valve structure and function.   Mild (1+) tricuspid valve regurgitation is present.     Pulmonic Valve   Normal appearing pulmonic valve structure and function.     Left Atrium   Normal appearing left atrium.     Left Ventricle   The left ventricle is normal in size, wall motion and contractility.   Mild concentric left ventricular hypertrophy is present.   Estimated left ventricular ejection fraction is 60-65 %.     Right Atrium   The right atrium appears mildly dilated.     Right Ventricle   The right ventricle is mildly dilated, with grossly preserved function     Pericardial Effusion   No evidence of pericardial effusion.     Pleural Effusion   No evidence of pleural effusion.     Miscellaneous   All visualized extra cardiac structures appears to be normal.     Impression     Summary     The left ventricle is normal in size, wall motion and contractility.   Mild concentric left ventricular hypertrophy is present.   Estimated left ventricular ejection fraction is 60-65 %.   The right ventricle is mildly dilated, with grossly preserved function   Mild aortic sclerosis is present with normal valvular opening.   Normal appearing mitral valve structure and function.   EA reversal of the mitral inflow consistent with reduced compliance of   the   left ventricle.   Mild (1+) mitral regurgitation is present.   Mild mitral annular calcification is present.     Signature     ----------------------------------------------------------------   Electronically signed by Connor Henderson(Interpreting physician)   on 08/01/2018 09:57 AM   ----------------------------------------------------------------    Valves     Mitral Valve     Peak E-Wave: 79.5 cm/s   Peak A-Wave: 116 cm/s   Peak Gradient: 2.53 mmHg                            E/A Ratio: 0.69     Aortic Valve     Cusp Separation: 2.2 cm     Tricuspid Valve     TR Velocity: 265 cm/s                Estimated RAP: 10 mmHg   TR Gradient: 28.09 mmHg              Estimated RVSP: 33 mmHg     Pulmonic Valve              Estimated PASP: 38.09 mmHg    Structures     Left Atrium     LA Dimension: 3.9 cm          LA Area: 14.2 cm^2   LA/Aorta: 1.11                LA Volume/Index: 32 ml /15m^2     Left Ventricle     Diastolic Dimension: 5.85 cm          Systolic Dimension: 3.66 cm   Septum Diastolic: 1.29 cm   PW Diastolic: 1.24 cm     FS: 37.44 %     Right Atrium     RA Systolic Pressure: 10 mmHg     Right Ventricle              RV Systolic Pressure: 38.09 mmHg     Miscellaneous     Aorta     Aortic Root: 3.5 cm        CONNOR HENDERSON M.D., ATTENDING CARDIOLOGIST      MEDICATIONS  (STANDING):  aspirin enteric coated 81 milliGRAM(s) Oral daily  cholecalciferol 2000 Unit(s) Oral daily  cyanocobalamin 1000 MICROGram(s) Oral daily  dextrose 5%. 1000 milliLiter(s) (50 mL/Hr) IV Continuous <Continuous>  dextrose 50% Injectable 12.5 Gram(s) IV Push once  dextrose 50% Injectable 25 Gram(s) IV Push once  dextrose 50% Injectable 25 Gram(s) IV Push once  fluticasone propionate 50 MICROgram(s)/spray Nasal Spray 1 Spray(s) Both Nostrils two times a day  heparin  Infusion.  Unit(s)/Hr (21 mL/Hr) IV Continuous <Continuous>  insulin glargine Injectable (LANTUS) 20 Unit(s) SubCutaneous at bedtime  insulin lispro (HumaLOG) corrective regimen sliding scale   SubCutaneous three times a day before meals  insulin lispro (HumaLOG) corrective regimen sliding scale   SubCutaneous at bedtime  levothyroxine 88 MICROGram(s) Oral daily  losartan 25 milliGRAM(s) Oral daily  metoprolol tartrate 25 milliGRAM(s) Oral two times a day  multivitamin/minerals 1 Tablet(s) Oral daily  sodium chloride 0.9%. 1000 milliLiter(s) (50 mL/Hr) IV Continuous <Continuous>  warfarin 10 milliGRAM(s) Oral once    MEDICATIONS  (PRN):  acetaminophen   Tablet. 650 milliGRAM(s) Oral every 6 hours PRN Mild Pain (1 - 3)  acetaminophen 325 mG/butalbital 50 mG/caffeine 40 mG 1 Tablet(s) Oral every 6 hours PRN migraine  dextrose 40% Gel 15 Gram(s) Oral once PRN Blood Glucose LESS THAN 70 milliGRAM(s)/deciliter  glucagon  Injectable 1 milliGRAM(s) IntraMuscular once PRN Glucose LESS THAN 70 milligrams/deciliter  heparin  Injectable 9500 Unit(s) IV Push every 6 hours PRN For aPTT less than 40  heparin  Injectable 4500 Unit(s) IV Push every 6 hours PRN For aPTT between 40 - 57  ondansetron Injectable 4 milliGRAM(s) IV Push every 6 hours PRN Nausea and/or Vomiting  sodium chloride 0.65% Nasal 1 Spray(s) Both Nostrils every 2 hours PRN Nasal Congestion        A/P: 1. Acute pulmonary embolism.     2. Right leg DVT.    3. Hypoxemia on RA.     4. ? TAMICA.    5. Obesity.     6. Reduced compliance of L.V.; dilated RV with preserved function.     PLAN: Continue heparin with transition to coumadin; INR still subtherapeutic. Continue O2 prn. Check O2 sat on RA at rest and with ambulation within 24 hours of hospital discharge to assess for home O2. Consider outpatient eval for thrombophilia with hematology although the patient has a possible risk factor (obesity with some relative immobilization following tx for kidney stones). Consider outpatient eval for TAMICA.
CHIEF COMPLAINT:    HPI:  78 / F with PMHx HTN, DM2, hypothyroidism, chronic back pain, urolithiasis 9mm s/p stent then lithotripsy then  s/p stent s/p removal Dr. Alvarez, c/b laryngeal inflammation with altered voice,  came to ED for c/o  sudden onset right lower chest pain/RUQ pain associated with inability to take a deep breath. It started last night. Pain was severe and was not relieved with hydrocodone. Also, associated with SOB.  No fevers, chills, sweats, weight loss, fatigue, or malaise.  Pt has been mostly sedentary in the last one month or so as she had 3 surgeries back to back.  Denies any leg pain.  CTA abdo/chest in ED showed PE with right heart strain.   Pt feeling better at this time with less sob but pleuritic chest pain on right side present. She is on 2 L of O2 via nc.  Venous Doppler showed RLE DVT.  Echo as below. No pulmonary HTN, RVSP 33.	  Summary     The left ventricle is normal in size, wall motion and contractility.   Mild concentric left ventricular hypertrophy is present.   Estimated left ventricular ejection fraction is 60-65 %.   The right ventricle is mildly dilated, with grossly preserved function   Mild aortic sclerosis is present with normal valvular opening.   Normal appearing mitral valve structure and function.   EA reversal of the mitral inflow consistent with reduced compliance of   the   left ventricle.   Mild (1+) mitral regurgitation is present.   Mild mitral annular calcification is present.    PAST MEDICAL & SURGICAL HISTORY:  Hypovitaminosis D  Renal cyst  Hypercholesterolemia  Spinal stenosis  Obesity  Poliomyelitis  Arytenoid finding: left arytenoid and vocal fold hematoma secondary to difficult intubation  Chronic back pain  Hypothyroidism  DM (diabetes mellitus)  Hypertension  History of tonsillectomy  Bartholin cyst  Ectopic pregnancy  Abscess of breast: drainage of abscess left breast  S/P shoulder replacement, left: partial  Encounter for removal of ureteral stent  S/P cholecystectomy      Allergies    Ammoniated Mercury (Rash)  Keflex (Pruritus)  penicillin G potassium (Pruritus; Rash)  Victoza (Other)    Intolerances        Occupation:  Alochol: Denied  Smoking: Denied  Drug Use: Denied  Marital Status:         FAMILY HISTORY:  Family history of hypertension (Father, Mother)  Family history of diabetes mellitus (Sibling, Grandparent): Brother and grandmother      REVIEW OF SYSTEMS:    CONSTITUTIONAL: No weakness, fevers or chills  EYES/ENT: No visual changes;  No vertigo or throat pain   NECK: No pain or stiffness  RESPIRATORY: No cough, wheezing, hemoptysis; No shortness of breath  CARDIOVASCULAR: No chest pain or palpitations  GASTROINTESTINAL: No abdominal or epigastric pain. No nausea, vomiting, or hematemesis; No diarrhea or constipation. No melena or hematochezia.  GENITOURINARY: No dysuria, frequency or hematuria  NEUROLOGICAL: No numbness or weakness  SKIN: No itching, burning, rashes, or lesions   All other review of systems is negative unless indicated above    Vital Signs Last 24 Hrs  T(C): 36.2 (04 Aug 2018 06:30), Max: 36.2 (04 Aug 2018 06:30)  T(F): 97.2 (04 Aug 2018 06:30), Max: 97.2 (04 Aug 2018 06:30)  HR: 56 (04 Aug 2018 08:00) (51 - 67)  BP: 120/45 (04 Aug 2018 08:00) (106/45 - 130/47)  BP(mean): 64 (04 Aug 2018 08:00) (60 - 92)  RR: 13 (04 Aug 2018 08:00) (13 - 18)  SpO2: 100% (04 Aug 2018 04:00) (91% - 100%)    I&O's Summary    03 Aug 2018 07:01  -  04 Aug 2018 07:00  --------------------------------------------------------  IN: 1099 mL / OUT: 3 mL / NET: 1096 mL        PHYSICAL EXAM:    Constitutional: NAD, awake and alert, well-developed  HEENT: PERR, EOMI,  No oral cyananosis.  Neck:  supple,  No JVD  Respiratory: Breath sounds are clear bilaterally, No wheezing, rales or rhonchi  Cardiovascular: S1 and S2, regular rate and rhythm, no Murmurs, gallops or rubs  Gastrointestinal: Bowel Sounds present, soft, nontender.   Extremities: No peripheral edema. No clubbing or cyanosis.  Vascular: 2+ peripheral pulses  Neurological: A/O x 3, no focal deficits  Musculoskeletal: no calf tenderness.  Skin: No rashes.    MEDICATIONS:  MEDICATIONS  (STANDING):  aspirin enteric coated 81 milliGRAM(s) Oral daily  cholecalciferol 2000 Unit(s) Oral daily  cyanocobalamin 1000 MICROGram(s) Oral daily  dextrose 5%. 1000 milliLiter(s) (50 mL/Hr) IV Continuous <Continuous>  dextrose 50% Injectable 12.5 Gram(s) IV Push once  dextrose 50% Injectable 25 Gram(s) IV Push once  dextrose 50% Injectable 25 Gram(s) IV Push once  fluticasone propionate 50 MICROgram(s)/spray Nasal Spray 1 Spray(s) Both Nostrils two times a day  heparin  Infusion.  Unit(s)/Hr (21 mL/Hr) IV Continuous <Continuous>  insulin glargine Injectable (LANTUS) 20 Unit(s) SubCutaneous at bedtime  insulin lispro (HumaLOG) corrective regimen sliding scale   SubCutaneous three times a day before meals  insulin lispro (HumaLOG) corrective regimen sliding scale   SubCutaneous at bedtime  levothyroxine 88 MICROGram(s) Oral daily  losartan 25 milliGRAM(s) Oral daily  metoprolol tartrate 25 milliGRAM(s) Oral two times a day  multivitamin/minerals 1 Tablet(s) Oral daily  sodium chloride 0.9%. 1000 milliLiter(s) (50 mL/Hr) IV Continuous <Continuous>  warfarin 10 milliGRAM(s) Oral once      LABS: All Labs Reviewed:                        10.6   5.94  )-----------( 163      ( 04 Aug 2018 05:21 )             31.6                         11.9   5.56  )-----------( 155      ( 03 Aug 2018 05:17 )             36.3                         10.4   6.52  )-----------( 169      ( 02 Aug 2018 04:34 )             32.0     04 Aug 2018 05:21    141    |  105    |  28     ----------------------------<  117    3.9     |  29     |  1.16   03 Aug 2018 05:17    136    |  102    |  30     ----------------------------<  106    3.8     |  28     |  1.46   02 Aug 2018 04:34    138    |  106    |  16     ----------------------------<  105    3.5     |  29     |  1.09     Ca    8.6        04 Aug 2018 05:21  Ca    8.8        03 Aug 2018 05:17  Ca    8.3        02 Aug 2018 04:34      PT/INR - ( 04 Aug 2018 05:21 )   PT: 13.4 sec;   INR: 1.24 ratio         PTT - ( 04 Aug 2018 05:21 )  PTT:63.6 sec  CARDIAC MARKERS ( 03 Aug 2018 05:17 )  <0.015 ng/mL / x     / x     / x     / x          Blood Culture:         RADIOLOGY/EKG:
HPI:  78 / F with PMHx HTN, DM2, hypothyroidism, chronic back pain, urolithiasis 9mm s/p stent then lithotripsy then  s/p stent s/p removal Dr. Alvarez, c/b laryngeal inflammation with altered voice,  came to ED for c/o  sudden onset right lower chest pain/RUQ pain associated with inability to take a deep breath. It started last night. Pain was severe and was not relieved with hydrocodone. Also, associated with SOB.  No fevers, chills, sweats, weight loss, fatigue, or malaise.  Pt has been mostly sedentary in the last one month or so as she had 3 surgeries back to back.  Denies any leg pain.  CTA abdo/chest in ED showed PE with right heart strain.   Pt feeling better at this time with less sob but pleuritic chest pain on right side present. She is on 2 L of O2 via nc.  she was seen by ICU service also. (01 Aug 2018 08:43)      Patient is a 78y old  Female who presents with a chief complaint of SOB, RUQ pain (01 Aug 2018 10:36)  pt found to have on 8-1-18 Right-sided segmental pulmonary emboli and Right lower extremity below the knee deep vein thrombosis.    Consulted by Dr. Marina   for VTE prophylaxis, risk stratification, and anticoagulation management.    PAST MEDICAL & SURGICAL HISTORY:  Hypovitaminosis D  Renal cyst  Hypercholesterolemia  Spinal stenosis  Obesity  Poliomyelitis  Arytenoid finding: left arytenoid and vocal fold hematoma secondary to difficult intubation  Chronic back pain  Hypothyroidism  DM (diabetes mellitus)  Hypertension  History of tonsillectomy  Bartholin cyst  Ectopic pregnancy  Abscess of breast: drainage of abscess left breast  S/P shoulder replacement, left: partial  Encounter for removal of ureteral stent  S/P cholecystectomy  CrCl: 63  IMPROVE VTE Risk Score: #5    IMPROVE Bleeding Risk Score #1.5    Falls Risk:   High (x  )  Mod (  )  Low (  )  8-3-18 Pt seen at bedside on cardiac SD, OOB in chair.  Discussed her anticoagulation with UFH overlapping with coumadin.  Questions answered. Literature provided.   Informed her that her INR is 1.17 today and I will increase her dose to  10MG  tonight.     FAMILY HISTORY:  Family history of hypertension (Father, Mother)  Family history of diabetes mellitus (Sibling, Grandparent): Brother and grandmother    Denies any personal or familial history of clotting or bleeding disorders.    Allergies    Ammoniated Mercury (Rash)  Keflex (Pruritus)  penicillin G potassium (Pruritus; Rash)  Victoza (Other)    Intolerances        REVIEW OF SYSTEMS    (  )Fever	     (  )Constipation	(  )SOB				(  )Headache	(  )Dysuria  (  )Chills	     (  )Melena	(  )Dyspnea present on exertion	                    (  )Dizziness                    (  )Polyuria  (  )Nausea	     (  )Hematochezia	(  )Cough			                    (  )Syncope   	(  )Hematuria  (  )Vomiting    (  )Chest Pain	(  )Wheezing			(  )Weakness  (  )Diarrhea     (  )Palpitations	(  )Anorexia			(  )Myalgia    All other review of systems negative: Yes      PHYSICAL EXAM:    Constitutional: Appears Well    Neurological: A& O x 3    Skin: Warm    Respiratory and Thorax: normal effort; Breath sounds: normal; No rales/wheezing/rhonchi  	  Cardiovascular: S1, S2, regular, NMBR	    Gastrointestinal: BS + x 4Q, nontender	    Genitourinary:  Bladder nondistended, nontender    Musculoskeletal:   General Right:   no muscle/joint tenderness,   normal tone, no joint swelling,   ROM:full	    General Left:   no muscle/joint tenderness,   normal tone, no joint swelling,   ROM: full    Lower extrems:   Right: no calf tenderness              negative kathe's sign               + pedal pulses    Left:   no calf tenderness              negative kathe's sign               + pedal pulses, + edema noted 1-2+                             11.9   5.56  )-----------( 155      ( 03 Aug 2018 05:17 )             36.3       08-03    136  |  102  |  30<H>  ----------------------------<  106<H>  3.8   |  28  |  1.46<H>    Ca    8.8      03 Aug 2018 05:17     PTT - ( 03 Aug 2018 05:17 )  PTT:82.3 sec                   10.4   6.52  )-----------( 169      ( 02 Aug 2018 04:34 )             32.0       08-02    138  |  106  |  16  ----------------------------<  105<H>  3.5   |  29  |  1.09    Ca    8.3<L>      02 Aug 2018 04:34    TPro  6.9  /  Alb  3.5  /  TBili  0.4  /  DBili  x   /  AST  13<L>  /  ALT  22  /  AlkPhos  98  07-31    PT/INR - ( 03 Aug 2018 05:17 )   PT: 12.7 sec;   INR: 1.17 ratio  PT/INR - ( 02 Aug 2018 04:34 )   PT: 12.0 sec;   INR: 1.11 ratio         PTT - ( 02 Aug 2018 11:14 )  PTT:65.4 sec			        CT head 8-1-18  IMPRESSION:    No acute intracranial hemorrhage, mass effect, or midline shift. No   interval change since head CT of 8/25/15.    Mild chronic bilateral cerebral white matter microvascular changes.        CHEST: ct 8-1-18    LUNGS, LARGE AIRWAYS, PLEURA: Patent central airways. No consolidation or   pneumothorax.  No pleural effusion. Bibasilar linear atelectasis  VESSELS: Atherosclerosis. There are filling defects along the right   segmental pulmonary arteries consistent with pulmonary emboli. There may   be an additional filling defect along the left lower lobe basal segment.   No saddle embolus. The main pulmonary artery measures 3.1 cm. Suggestion   of slight bowing of the interventricular septum to the left  HEART: Heart size is normal. No pericardial effusion. Coronary   atherosclerosis  MEDIASTINUM AND SHANNAN: No lymphadenopathy.  CHEST WALL AND LOWER NECK: Within normal limits.    ABDOMEN AND PELVIS: 8-1-18    LIVER: Within normal limits.  GALLBLADDER: Cholecystectomy  SPLEEN: Within normal limits.  PANCREAS: Within normal limits.  ADRENALS: Within normal limits.  KIDNEYS/URETERS: Right renal cyst again noted. The previously seen   calculus within the left renal pelvis has passed and there is no longer   left-sided hydronephrosis or perinephric stranding. Left-sided lesions   again noted, too small to characterize. Presumed left parapelvic cysts.   The left kidney is atrophic.    BLADDER: Within normal limits.  REPRODUCTIVE ORGANS: Unremarkable    BOWEL: Limited without oral contrast. No evidence of bowel obstruction.   Normal appendix. Extensive diverticula without evidence of diverticulitis.  PERITONEUM: No ascites.  VESSELS:  Atherosclerosis  RETROPERITONEUM: No lymphadenopathy.    ABDOMINAL WALL: Tiny fat-containing umbilical hernia  BONES: Degenerative changes    IMPRESSION:    Right-sided segmental pulmonary emboli. Suggestion of bowing of the   interventricular septum to the left suggesting right heart strain.   Correlate with echo.    	    Doppler: 8-1-18  FINDINGS:    Right lower extremity: There is thrombus in the right soleal and   posterior tibial veins as well as the distal popliteal vein. There is   normal compressibility of the  common femoral, femoral and popliteal   veins.      Left lower extremity: There is normal compressibility of the  common   femoral, femoral and popliteal veins. No calf vein thrombosis is detected.    There are bilateral popliteal cysts, measuring 4.0 x 1.0 x 2.0 cm on the   right and 5.0 x 2.1 cm on the left.    IMPRESSION:     Right lower extremity below the knee deep vein thrombosis.      MEDICATIONS  (STANDING):  aspirin enteric coated 81 milliGRAM(s) Oral daily  cholecalciferol 2000 Unit(s) Oral daily  cyanocobalamin 1000 MICROGram(s) Oral daily  dextrose 5%. 1000 milliLiter(s) IV Continuous <Continuous>  dextrose 50% Injectable 12.5 Gram(s) IV Push once  dextrose 50% Injectable 25 Gram(s) IV Push once  dextrose 50% Injectable 25 Gram(s) IV Push once  heparin  Infusion.  Unit(s)/Hr IV Continuous <Continuous>  insulin glargine Injectable (LANTUS) 20 Unit(s) SubCutaneous at bedtime  insulin lispro (HumaLOG) corrective regimen sliding scale   SubCutaneous three times a day before meals  insulin lispro (HumaLOG) corrective regimen sliding scale   SubCutaneous at bedtime  levothyroxine 88 MICROGram(s) Oral daily  losartan 100 milliGRAM(s) Oral daily  metoprolol tartrate 25 milliGRAM(s) Oral two times a day  multivitamin/minerals 1 Tablet(s) Oral daily  warfarin 10 milliGRAM(s) Oral once        DVT Prophylaxis:  LMWH                   (  )  Heparin SQ           (  )  Coumadin             (  x)  Xarelto                  (  )  Eliquis                   (  )  Venodynes           (x  )  Ambulation          (  )  UFH                       ( x )  Contraindicated  (  )
HPI:  78 / F with PMHx HTN, DM2, hypothyroidism, chronic back pain, urolithiasis 9mm s/p stent then lithotripsy then  s/p stent s/p removal Dr. Alvarez, c/b laryngeal inflammation with altered voice,  came to ED for c/o  sudden onset right lower chest pain/RUQ pain associated with inability to take a deep breath. It started last night. Pain was severe and was not relieved with hydrocodone. Also, associated with SOB.  No fevers, chills, sweats, weight loss, fatigue, or malaise.  Pt has been mostly sedentary in the last one month or so as she had 3 surgeries back to back.  Denies any leg pain.  CTA abdo/chest in ED showed PE with right heart strain.   Pt feeling better at this time with less sob but pleuritic chest pain on right side present. She is on 2 L of O2 via nc.  she was seen by ICU service also. (01 Aug 2018 08:43)      Patient is a 78y old  Female who presents with a chief complaint of SOB, RUQ pain (01 Aug 2018 10:36)  pt found to have on 8-1-18 Right-sided segmental pulmonary emboli and Right lower extremity below the knee deep vein thrombosis.    Consulted by Dr. Marina   for VTE prophylaxis, risk stratification, and anticoagulation management.    PAST MEDICAL & SURGICAL HISTORY:  Hypovitaminosis D  Renal cyst  Hypercholesterolemia  Spinal stenosis  Obesity  Poliomyelitis  Arytenoid finding: left arytenoid and vocal fold hematoma secondary to difficult intubation  Chronic back pain  Hypothyroidism  DM (diabetes mellitus)  Hypertension  History of tonsillectomy  Bartholin cyst  Ectopic pregnancy  Abscess of breast: drainage of abscess left breast  S/P shoulder replacement, left: partial  Encounter for removal of ureteral stent  S/P cholecystectomy  CrCl: 63  IMPROVE VTE Risk Score: #5    IMPROVE Bleeding Risk Score #1.5    Falls Risk:   High (x  )  Mod (  )  Low (  )  8-3-18 Pt seen at bedside on cardiac SD, OOB in chair.  Discussed her anticoagulation with UFH overlapping with coumadin.  Questions answered. Literature provided.   Informed her that her INR is 1.17 today and I will increase her dose to  10MG  tonight.   8/4/18: Patient seen at bedside OOB to chair napping. Discussed current INR 1.24 from 1.17 yesterday. O2Sat 100% on 2lnc at this time- patient comfortable.    FAMILY HISTORY:  Family history of hypertension (Father, Mother)  Family history of diabetes mellitus (Sibling, Grandparent): Brother and grandmother    Denies any personal or familial history of clotting or bleeding disorders.    Allergies    Ammoniated Mercury (Rash)  Keflex (Pruritus)  penicillin G potassium (Pruritus; Rash)  Victoza (Other)    Intolerances        REVIEW OF SYSTEMS    (  )Fever	     (  )Constipation	(  )SOB				(  )Headache	(  )Dysuria  (  )Chills	     (  )Melena	(  )Dyspnea present on exertion	                    (  )Dizziness                    (  )Polyuria  (  )Nausea	     (  )Hematochezia	(  )Cough			                    (  )Syncope   	(  )Hematuria  (  )Vomiting    (  )Chest Pain	(  )Wheezing			(  )Weakness  (  )Diarrhea     (  )Palpitations	(  )Anorexia			(  )Myalgia    All other review of systems negative: Yes      PHYSICAL EXAM:    Constitutional: Appears Well    Neurological: A& O x 3    Skin: Warm    Respiratory and Thorax: normal effort; Breath sounds: normal; No rales/wheezing/rhonchi  	  Cardiovascular: S1, S2, regular, NMBR	    Gastrointestinal: BS + x 4Q, nontender	    Genitourinary:  Bladder nondistended, nontender    Musculoskeletal:   General Right:   no muscle/joint tenderness,   normal tone, no joint swelling,   ROM:full	    General Left:   no muscle/joint tenderness,   normal tone, no joint swelling,   ROM: full    Lower extrems:   Right: no calf tenderness              negative kathe's sign               + pedal pulses    Left:   no calf tenderness              negative kathe's sign               + pedal pulses, + edema noted 1-2+                          10.6   5.94  )-----------( 163      ( 04 Aug 2018 05:21 )             31.6       08-04    141  |  105  |  28<H>  ----------------------------<  117<H>  3.9   |  29  |  1.16    Ca    8.6      04 Aug 2018 05:21        PT/INR - ( 04 Aug 2018 05:21 )   PT: 13.4 sec;   INR: 1.24 ratio         PTT - ( 04 Aug 2018 05:21 )  PTT:63.6 sec                             11.9   5.56  )-----------( 155      ( 03 Aug 2018 05:17 )             36.3       08-03    136  |  102  |  30<H>  ----------------------------<  106<H>  3.8   |  28  |  1.46<H>    Ca    8.8      03 Aug 2018 05:17     PTT - ( 03 Aug 2018 05:17 )  PTT:82.3 sec                   10.4   6.52  )-----------( 169      ( 02 Aug 2018 04:34 )             32.0       08-02    138  |  106  |  16  ----------------------------<  105<H>  3.5   |  29  |  1.09    Ca    8.3<L>      02 Aug 2018 04:34    TPro  6.9  /  Alb  3.5  /  TBili  0.4  /  DBili  x   /  AST  13<L>  /  ALT  22  /  AlkPhos  98  07-31      PT/INR - ( 04 Aug 2018 05:21 )   PT: 13.4 sec;   INR: 1.24 ratio    PT/INR - ( 03 Aug 2018 05:17 )   PT: 12.7 sec;   INR: 1.17 ratio  PT/INR - ( 02 Aug 2018 04:34 )   PT: 12.0 sec;   INR: 1.11 ratio           CT head 8-1-18  IMPRESSION:    No acute intracranial hemorrhage, mass effect, or midline shift. No   interval change since head CT of 8/25/15.    Mild chronic bilateral cerebral white matter microvascular changes.        CHEST: ct 8-1-18    LUNGS, LARGE AIRWAYS, PLEURA: Patent central airways. No consolidation or   pneumothorax.  No pleural effusion. Bibasilar linear atelectasis  VESSELS: Atherosclerosis. There are filling defects along the right   segmental pulmonary arteries consistent with pulmonary emboli. There may   be an additional filling defect along the left lower lobe basal segment.   No saddle embolus. The main pulmonary artery measures 3.1 cm. Suggestion   of slight bowing of the interventricular septum to the left  HEART: Heart size is normal. No pericardial effusion. Coronary   atherosclerosis  MEDIASTINUM AND SHANNAN: No lymphadenopathy.  CHEST WALL AND LOWER NECK: Within normal limits.    ABDOMEN AND PELVIS: 8-1-18    LIVER: Within normal limits.  GALLBLADDER: Cholecystectomy  SPLEEN: Within normal limits.  PANCREAS: Within normal limits.  ADRENALS: Within normal limits.  KIDNEYS/URETERS: Right renal cyst again noted. The previously seen   calculus within the left renal pelvis has passed and there is no longer   left-sided hydronephrosis or perinephric stranding. Left-sided lesions   again noted, too small to characterize. Presumed left parapelvic cysts.   The left kidney is atrophic.    BLADDER: Within normal limits.  REPRODUCTIVE ORGANS: Unremarkable    BOWEL: Limited without oral contrast. No evidence of bowel obstruction.   Normal appendix. Extensive diverticula without evidence of diverticulitis.  PERITONEUM: No ascites.  VESSELS:  Atherosclerosis  RETROPERITONEUM: No lymphadenopathy.    ABDOMINAL WALL: Tiny fat-containing umbilical hernia  BONES: Degenerative changes    IMPRESSION:    Right-sided segmental pulmonary emboli. Suggestion of bowing of the   interventricular septum to the left suggesting right heart strain.   Correlate with echo.    	    Doppler: 8-1-18  FINDINGS:    Right lower extremity: There is thrombus in the right soleal and   posterior tibial veins as well as the distal popliteal vein. There is   normal compressibility of the  common femoral, femoral and popliteal   veins.      Left lower extremity: There is normal compressibility of the  common   femoral, femoral and popliteal veins. No calf vein thrombosis is detected.    There are bilateral popliteal cysts, measuring 4.0 x 1.0 x 2.0 cm on the   right and 5.0 x 2.1 cm on the left.    IMPRESSION:     Right lower extremity below the knee deep vein thrombosis.      MEDICATIONS  (STANDING):  aspirin enteric coated 81 milliGRAM(s) Oral daily  cholecalciferol 2000 Unit(s) Oral daily  cyanocobalamin 1000 MICROGram(s) Oral daily  dextrose 5%. 1000 milliLiter(s) IV Continuous <Continuous>  dextrose 50% Injectable 12.5 Gram(s) IV Push once  dextrose 50% Injectable 25 Gram(s) IV Push once  dextrose 50% Injectable 25 Gram(s) IV Push once  heparin  Infusion.  Unit(s)/Hr IV Continuous <Continuous>  insulin glargine Injectable (LANTUS) 20 Unit(s) SubCutaneous at bedtime  insulin lispro (HumaLOG) corrective regimen sliding scale   SubCutaneous three times a day before meals  insulin lispro (HumaLOG) corrective regimen sliding scale   SubCutaneous at bedtime  levothyroxine 88 MICROGram(s) Oral daily  losartan 100 milliGRAM(s) Oral daily  metoprolol tartrate 25 milliGRAM(s) Oral two times a day  multivitamin/minerals 1 Tablet(s) Oral daily  warfarin 10 milliGRAM(s) Oral once        DVT Prophylaxis:  LMWH                   (  )  Heparin SQ           (  )  Coumadin             (  x)  Xarelto                  (  )  Eliquis                   (  )  Venodynes           (x  )  Ambulation          (  )  UFH                       ( x )  Contraindicated  (  )
HPI:  78 / F with PMHx HTN, DM2, hypothyroidism, chronic back pain, urolithiasis 9mm s/p stent then lithotripsy then  s/p stent s/p removal Dr. Alvarez, c/b laryngeal inflammation with altered voice,  came to ED for c/o  sudden onset right lower chest pain/RUQ pain associated with inability to take a deep breath. It started last night. Pain was severe and was not relieved with hydrocodone. Also, associated with SOB.  No fevers, chills, sweats, weight loss, fatigue, or malaise.  Pt has been mostly sedentary in the last one month or so as she had 3 surgeries back to back.  Denies any leg pain.  CTA abdo/chest in ED showed PE with right heart strain.   Pt feeling better at this time with less sob but pleuritic chest pain on right side present. She is on 2 L of O2 via nc.  she was seen by ICU service also. (01 Aug 2018 08:43)      Patient is a 78y old  Female who presents with a chief complaint of SOB, RUQ pain (01 Aug 2018 10:36)  pt found to have on 8-1-18 Right-sided segmental pulmonary emboli and Right lower extremity below the knee deep vein thrombosis.    Consulted by Dr. Marina   for VTE prophylaxis, risk stratification, and anticoagulation management.    PAST MEDICAL & SURGICAL HISTORY:  Hypovitaminosis D  Renal cyst  Hypercholesterolemia  Spinal stenosis  Obesity  Poliomyelitis  Arytenoid finding: left arytenoid and vocal fold hematoma secondary to difficult intubation  Chronic back pain  Hypothyroidism  DM (diabetes mellitus)  Hypertension  History of tonsillectomy  Bartholin cyst  Ectopic pregnancy  Abscess of breast: drainage of abscess left breast  S/P shoulder replacement, left: partial  Encounter for removal of ureteral stent  S/P cholecystectomy  CrCl: 63  IMPROVE VTE Risk Score: #5    IMPROVE Bleeding Risk Score #1.5    Falls Risk:   High (x  )  Mod (  )  Low (  )  8-3-18 Pt seen at bedside on cardiac SD, OOB in chair.  Discussed her anticoagulation with UFH overlapping with coumadin.  Questions answered. Literature provided.   Informed her that her INR is 1.17 today and I will increase her dose to  10MG  tonight.   8/4/18: Patient seen at bedside OOB to chair napping. Discussed current INR 1.24 from 1.17 yesterday. O2Sat 100% on 2lnc at this time- patient comfortable.  8/5/18: Patient seen at bedside- resting. INR 1.58 from 1.24 yesterday. Will continue 10mg again today. Probably needs 7.5mg daily to maintain therapeutic INR but will await tomorrow's INR.    FAMILY HISTORY:  Family history of hypertension (Father, Mother)  Family history of diabetes mellitus (Sibling, Grandparent): Brother and grandmother    Denies any personal or familial history of clotting or bleeding disorders.    Allergies    Ammoniated Mercury (Rash)  Keflex (Pruritus)  penicillin G potassium (Pruritus; Rash)  Victoza (Other)    Intolerances        REVIEW OF SYSTEMS    (  )Fever	     (  )Constipation	(  )SOB				(  )Headache	(  )Dysuria  (  )Chills	     (  )Melena	(  )Dyspnea present on exertion	                    (  )Dizziness                    (  )Polyuria  (  )Nausea	     (  )Hematochezia	(  )Cough			                    (  )Syncope   	(  )Hematuria  (  )Vomiting    (  )Chest Pain	(  )Wheezing			(  )Weakness  (  )Diarrhea     (  )Palpitations	(  )Anorexia			(  )Myalgia    All other review of systems negative: Yes      PHYSICAL EXAM:    Constitutional: Appears Well    Neurological: A& O x 3    Skin: Warm    Respiratory and Thorax: normal effort; Breath sounds: normal; No rales/wheezing/rhonchi  	  Cardiovascular: S1, S2, regular, NMBR	    Gastrointestinal: BS + x 4Q, nontender	    Genitourinary:  Bladder nondistended, nontender    Musculoskeletal:   General Right:   no muscle/joint tenderness,   normal tone, no joint swelling,   ROM:full	    General Left:   no muscle/joint tenderness,   normal tone, no joint swelling,   ROM: full    Lower extrems:   Right: no calf tenderness              negative kathe's sign               + pedal pulses    Left:   no calf tenderness              negative kathe's sign               + pedal pulses, + edema noted 1-2+                          10.6   5.63  )-----------( 161      ( 05 Aug 2018 05:25 )             32.7       08-05    145  |  108  |  19  ----------------------------<  121<H>  4.5   |  31  |  0.97    Ca    8.4<L>      05 Aug 2018 05:25        PT/INR - ( 05 Aug 2018 05:25 )   PT: 17.2 sec;   INR: 1.58 ratio         PTT - ( 05 Aug 2018 05:25 )  PTT:75.8 sec                          10.6   5.94  )-----------( 163      ( 04 Aug 2018 05:21 )             31.6       08-04    141  |  105  |  28<H>  ----------------------------<  117<H>  3.9   |  29  |  1.16    Ca    8.6      04 Aug 2018 05:21        PT/INR - ( 04 Aug 2018 05:21 )   PT: 13.4 sec;   INR: 1.24 ratio         PTT - ( 04 Aug 2018 05:21 )  PTT:63.6 sec                             11.9   5.56  )-----------( 155      ( 03 Aug 2018 05:17 )             36.3       08-03    136  |  102  |  30<H>  ----------------------------<  106<H>  3.8   |  28  |  1.46<H>    Ca    8.8      03 Aug 2018 05:17     PTT - ( 03 Aug 2018 05:17 )  PTT:82.3 sec                   10.4   6.52  )-----------( 169      ( 02 Aug 2018 04:34 )             32.0       08-02    138  |  106  |  16  ----------------------------<  105<H>  3.5   |  29  |  1.09    Ca    8.3<L>      02 Aug 2018 04:34    TPro  6.9  /  Alb  3.5  /  TBili  0.4  /  DBili  x   /  AST  13<L>  /  ALT  22  /  AlkPhos  98  07-31    PT/INR - ( 05 Aug 2018 05:25 )   PT: 17.2 sec;   INR: 1.58 ratio   PT/INR - ( 04 Aug 2018 05:21 )   PT: 13.4 sec;   INR: 1.24 ratio    PT/INR - ( 03 Aug 2018 05:17 )   PT: 12.7 sec;   INR: 1.17 ratio  PT/INR - ( 02 Aug 2018 04:34 )   PT: 12.0 sec;   INR: 1.11 ratio           CT head 8-1-18  IMPRESSION:    No acute intracranial hemorrhage, mass effect, or midline shift. No   interval change since head CT of 8/25/15.    Mild chronic bilateral cerebral white matter microvascular changes.        CHEST: ct 8-1-18    LUNGS, LARGE AIRWAYS, PLEURA: Patent central airways. No consolidation or   pneumothorax.  No pleural effusion. Bibasilar linear atelectasis  VESSELS: Atherosclerosis. There are filling defects along the right   segmental pulmonary arteries consistent with pulmonary emboli. There may   be an additional filling defect along the left lower lobe basal segment.   No saddle embolus. The main pulmonary artery measures 3.1 cm. Suggestion   of slight bowing of the interventricular septum to the left  HEART: Heart size is normal. No pericardial effusion. Coronary   atherosclerosis  MEDIASTINUM AND SHANNAN: No lymphadenopathy.  CHEST WALL AND LOWER NECK: Within normal limits.    ABDOMEN AND PELVIS: 8-1-18    LIVER: Within normal limits.  GALLBLADDER: Cholecystectomy  SPLEEN: Within normal limits.  PANCREAS: Within normal limits.  ADRENALS: Within normal limits.  KIDNEYS/URETERS: Right renal cyst again noted. The previously seen   calculus within the left renal pelvis has passed and there is no longer   left-sided hydronephrosis or perinephric stranding. Left-sided lesions   again noted, too small to characterize. Presumed left parapelvic cysts.   The left kidney is atrophic.    BLADDER: Within normal limits.  REPRODUCTIVE ORGANS: Unremarkable    BOWEL: Limited without oral contrast. No evidence of bowel obstruction.   Normal appendix. Extensive diverticula without evidence of diverticulitis.  PERITONEUM: No ascites.  VESSELS:  Atherosclerosis  RETROPERITONEUM: No lymphadenopathy.    ABDOMINAL WALL: Tiny fat-containing umbilical hernia  BONES: Degenerative changes    IMPRESSION:    Right-sided segmental pulmonary emboli. Suggestion of bowing of the   interventricular septum to the left suggesting right heart strain.   Correlate with echo.    	    Doppler: 8-1-18  FINDINGS:    Right lower extremity: There is thrombus in the right soleal and   posterior tibial veins as well as the distal popliteal vein. There is   normal compressibility of the  common femoral, femoral and popliteal   veins.      Left lower extremity: There is normal compressibility of the  common   femoral, femoral and popliteal veins. No calf vein thrombosis is detected.    There are bilateral popliteal cysts, measuring 4.0 x 1.0 x 2.0 cm on the   right and 5.0 x 2.1 cm on the left.    IMPRESSION:     Right lower extremity below the knee deep vein thrombosis.      MEDICATIONS  (STANDING):  aspirin enteric coated 81 milliGRAM(s) Oral daily  cholecalciferol 2000 Unit(s) Oral daily  cyanocobalamin 1000 MICROGram(s) Oral daily  dextrose 5%. 1000 milliLiter(s) IV Continuous <Continuous>  dextrose 50% Injectable 12.5 Gram(s) IV Push once  dextrose 50% Injectable 25 Gram(s) IV Push once  dextrose 50% Injectable 25 Gram(s) IV Push once  heparin  Infusion.  Unit(s)/Hr IV Continuous <Continuous>  insulin glargine Injectable (LANTUS) 20 Unit(s) SubCutaneous at bedtime  insulin lispro (HumaLOG) corrective regimen sliding scale   SubCutaneous three times a day before meals  insulin lispro (HumaLOG) corrective regimen sliding scale   SubCutaneous at bedtime  levothyroxine 88 MICROGram(s) Oral daily  losartan 100 milliGRAM(s) Oral daily  metoprolol tartrate 25 milliGRAM(s) Oral two times a day  multivitamin/minerals 1 Tablet(s) Oral daily  warfarin 10 milliGRAM(s) Oral once        DVT Prophylaxis:  LMWH                   (  )  Heparin SQ           (  )  Coumadin             (  x)  Xarelto                  (  )  Eliquis                   (  )  Venodynes           (x  )  Ambulation          (  )  UFH                       ( x )  Contraindicated  (  )
HPI:  78 / F with PMHx HTN, DM2, hypothyroidism, chronic back pain, urolithiasis 9mm s/p stent then lithotripsy then  s/p stent s/p removal Dr. Alvarez, c/b laryngeal inflammation with altered voice,  came to ED for c/o  sudden onset right lower chest pain/RUQ pain associated with inability to take a deep breath. It started last night. Pain was severe and was not relieved with hydrocodone. Also, associated with SOB.  No fevers, chills, sweats, weight loss, fatigue, or malaise.  Pt has been mostly sedentary in the last one month or so as she had 3 surgeries back to back.  Denies any leg pain.  CTA abdo/chest in ED showed PE with right heart strain.   Pt feeling better at this time with less sob but pleuritic chest pain on right side present. She is on 2 L of O2 via nc.  she was seen by ICU service also. (01 Aug 2018 08:43)      Patient is a 78y old  Female who presents with a chief complaint of SOB, RUQ pain (01 Aug 2018 10:36)  pt found to have on 8-1-18 Right-sided segmental pulmonary emboli and Right lower extremity below the knee deep vein thrombosis.    Consulted by Dr. Marina   for VTE prophylaxis, risk stratification, and anticoagulation management.    PAST MEDICAL & SURGICAL HISTORY:  Hypovitaminosis D  Renal cyst  Hypercholesterolemia  Spinal stenosis  Obesity  Poliomyelitis  Arytenoid finding: left arytenoid and vocal fold hematoma secondary to difficult intubation  Chronic back pain  Hypothyroidism  DM (diabetes mellitus)  Hypertension  History of tonsillectomy  Bartholin cyst  Ectopic pregnancy  Abscess of breast: drainage of abscess left breast  S/P shoulder replacement, left: partial  Encounter for removal of ureteral stent  S/P cholecystectomy  CrCl: 63  IMPROVE VTE Risk Score: #5    IMPROVE Bleeding Risk Score #1.5    Falls Risk:   High (x  )  Mod (  )  Low (  )  8-3-18 Pt seen at bedside on cardiac SD, OOB in chair.  Discussed her anticoagulation with UFH overlapping with coumadin.  Questions answered. Literature provided.   Informed her that her INR is 1.17 today and I will increase her dose to  10MG  tonight.   8/4/18: Patient seen at bedside OOB to chair napping. Discussed current INR 1.24 from 1.17 yesterday. O2Sat 100% on 2lnc at this time- patient comfortable.  8/5/18: Patient seen at bedside- resting. INR 1.58 from 1.24 yesterday. Will continue 10mg again today. Probably needs 7.5mg daily to maintain therapeutic INR but will await tomorrow's INR.  8/6: seen at bedside, no concerns, plans on f/u at Dr. Aguilar's office for INR's.    FAMILY HISTORY:  Family history of hypertension (Father, Mother)  Family history of diabetes mellitus (Sibling, Grandparent): Brother and grandmother    Denies any personal or familial history of clotting or bleeding disorders.    Allergies    Ammoniated Mercury (Rash)  Keflex (Pruritus)  penicillin G potassium (Pruritus; Rash)  Victoza (Other)    Intolerances        REVIEW OF SYSTEMS    (  )Fever	     (  )Constipation	(  )SOB				(  )Headache	(  )Dysuria  (  )Chills	     (  )Melena	(  )Dyspnea present on exertion	                    (  )Dizziness                    (  )Polyuria  (  )Nausea	     (  )Hematochezia	(  )Cough			                    (  )Syncope   	(  )Hematuria  (  )Vomiting    (  )Chest Pain	(  )Wheezing			(  )Weakness  (  )Diarrhea     (  )Palpitations	(  )Anorexia			(  )Myalgia    All other review of systems negative: Yes      PHYSICAL EXAM:    Constitutional: Appears Well    Neurological: A& O x 3    Skin: Warm    Respiratory and Thorax: normal effort; Breath sounds: normal; diminished  	  Cardiovascular: S1, S2, regular, NMBR	    Gastrointestinal: BS + x 4Q, nontender	    Genitourinary:  Bladder nondistended, nontender    Musculoskeletal:   General Right:   no muscle/joint tenderness,   normal tone, no joint swelling,   ROM:full	    General Left:   no muscle/joint tenderness,   normal tone, no joint swelling,   ROM: full    Lower extrems:   Right: no calf tenderness              negative kathe's sign               + pedal pulses    Left:   no calf tenderness              negative kathe's sign               + pedal pulses, + edema noted 1-2+                              10.3   5.41  )-----------( 161      ( 07 Aug 2018 05:03 )             31.0       08-07    144  |  107  |  19  ----------------------------<  126<H>  4.0   |  30  |  1.03    Ca    8.7      07 Aug 2018 05:03             PTT - ( 07 Aug 2018 05:03 )  PTT:97.3 sec                          10.7   5.60  )-----------( 152      ( 06 Aug 2018 06:01 )             32.6       08-06    144  |  108  |  16  ----------------------------<  120<H>  4.1   |  28  |  0.90    Ca    8.5      06 Aug 2018 06:01         PTT - ( 06 Aug 2018 06:01 )  PTT:94.2 sec                            10.6   5.63  )-----------( 161      ( 05 Aug 2018 05:25 )             32.7       08-05    145  |  108  |  19  ----------------------------<  121<H>  4.5   |  31  |  0.97    Ca    8.4<L>      05 Aug 2018 05:25       PTT - ( 05 Aug 2018 05:25 )  PTT:75.8 sec                          10.6   5.94  )-----------( 163      ( 04 Aug 2018 05:21 )             31.6       08-04    141  |  105  |  28<H>  ----------------------------<  117<H>  3.9   |  29  |  1.16    Ca    8.6      04 Aug 2018 05:21          PT/INR - ( 07 Aug 2018 05:03 )   PT: 21.4 sec;   INR: 1.95 ratio    PT/INR - ( 06 Aug 2018 06:01 )   PT: 20.1 sec;   INR: 1.84 ratio    PT/INR - ( 05 Aug 2018 05:25 )   PT: 17.2 sec;   INR: 1.58 ratio    PT/INR - ( 04 Aug 2018 05:21 )   PT: 13.4 sec;   INR: 1.24 ratio           PT/INR - ( 05 Aug 2018 05:25 )   PT: 17.2 sec;   INR: 1.58 ratio   PT/INR - ( 04 Aug 2018 05:21 )   PT: 13.4 sec;   INR: 1.24 ratio    PT/INR - ( 03 Aug 2018 05:17 )   PT: 12.7 sec;   INR: 1.17 ratio  PT/INR - ( 02 Aug 2018 04:34 )   PT: 12.0 sec;   INR: 1.11 ratio           CT head 8-1-18  IMPRESSION:    No acute intracranial hemorrhage, mass effect, or midline shift. No   interval change since head CT of 8/25/15.    Mild chronic bilateral cerebral white matter microvascular changes.        CHEST: ct 8-1-18    LUNGS, LARGE AIRWAYS, PLEURA: Patent central airways. No consolidation or   pneumothorax.  No pleural effusion. Bibasilar linear atelectasis  VESSELS: Atherosclerosis. There are filling defects along the right   segmental pulmonary arteries consistent with pulmonary emboli. There may   be an additional filling defect along the left lower lobe basal segment.   No saddle embolus. The main pulmonary artery measures 3.1 cm. Suggestion   of slight bowing of the interventricular septum to the left  HEART: Heart size is normal. No pericardial effusion. Coronary   atherosclerosis  MEDIASTINUM AND SHANNAN: No lymphadenopathy.  CHEST WALL AND LOWER NECK: Within normal limits.    ABDOMEN AND PELVIS: 8-1-18    LIVER: Within normal limits.  GALLBLADDER: Cholecystectomy  SPLEEN: Within normal limits.  PANCREAS: Within normal limits.  ADRENALS: Within normal limits.  KIDNEYS/URETERS: Right renal cyst again noted. The previously seen   calculus within the left renal pelvis has passed and there is no longer   left-sided hydronephrosis or perinephric stranding. Left-sided lesions   again noted, too small to characterize. Presumed left parapelvic cysts.   The left kidney is atrophic.    BLADDER: Within normal limits.  REPRODUCTIVE ORGANS: Unremarkable    BOWEL: Limited without oral contrast. No evidence of bowel obstruction.   Normal appendix. Extensive diverticula without evidence of diverticulitis.  PERITONEUM: No ascites.  VESSELS:  Atherosclerosis  RETROPERITONEUM: No lymphadenopathy.    ABDOMINAL WALL: Tiny fat-containing umbilical hernia  BONES: Degenerative changes    IMPRESSION:    Right-sided segmental pulmonary emboli. Suggestion of bowing of the   interventricular septum to the left suggesting right heart strain.   Correlate with echo.    	    Doppler: 8-1-18  FINDINGS:    Right lower extremity: There is thrombus in the right soleal and   posterior tibial veins as well as the distal popliteal vein. There is   normal compressibility of the  common femoral, femoral and popliteal   veins.      Left lower extremity: There is normal compressibility of the  common   femoral, femoral and popliteal veins. No calf vein thrombosis is detected.    There are bilateral popliteal cysts, measuring 4.0 x 1.0 x 2.0 cm on the   right and 5.0 x 2.1 cm on the left.    IMPRESSION:     Right lower extremity below the knee deep vein thrombosis.    MEDICATIONS  (STANDING):  aspirin enteric coated 81 milliGRAM(s) Oral daily  cholecalciferol 2000 Unit(s) Oral daily  cyanocobalamin 1000 MICROGram(s) Oral daily  dextrose 5%. 1000 milliLiter(s) IV Continuous <Continuous>  dextrose 50% Injectable 12.5 Gram(s) IV Push once  dextrose 50% Injectable 25 Gram(s) IV Push once  dextrose 50% Injectable 25 Gram(s) IV Push once  fluticasone propionate 50 MICROgram(s)/spray Nasal Spray 1 Spray(s) Both Nostrils two times a day  heparin  Infusion.  Unit(s)/Hr IV Continuous <Continuous>  insulin glargine Injectable (LANTUS) 20 Unit(s) SubCutaneous at bedtime  insulin lispro (HumaLOG) corrective regimen sliding scale   SubCutaneous three times a day before meals  insulin lispro (HumaLOG) corrective regimen sliding scale   SubCutaneous at bedtime  levothyroxine 88 MICROGram(s) Oral daily  losartan 25 milliGRAM(s) Oral daily  metoprolol succinate ER 25 milliGRAM(s) Oral daily  multivitamin/minerals 1 Tablet(s) Oral daily  sodium chloride 0.9%. 1000 milliLiter(s) IV Continuous <Continuous>  warfarin 7.5 milliGRAM(s) Oral daily      Vital Signs Last 24 Hrs  T(C): 36.3 (08-07-18 @ 10:29), Max: 37.1 (08-06-18 @ 23:57)  T(F): 97.3 (08-07-18 @ 10:29), Max: 98.7 (08-06-18 @ 23:57)  HR: 77 (08-06-18 @ 23:57) (77 - 77)  BP: 146/61 (08-07-18 @ 06:30) (131/59 - 175/61)  BP(mean): 81 (08-07-18 @ 06:30) (81 - 89)  RR: 18 (08-06-18 @ 23:57) (18 - 18)  SpO2: 100% (08-07-18 @ 06:30) (97% - 100%)    DVT Prophylaxis:  LMWH                   (  )  Heparin SQ           (  )  Coumadin             (  x)  Xarelto                  (  )  Eliquis                   (  )  Venodynes           (x  )  Ambulation          (  )  UFH                       ( x )  Contraindicated  (  )
HPI:  78 / F with PMHx HTN, DM2, hypothyroidism, chronic back pain, urolithiasis 9mm s/p stent then lithotripsy then  s/p stent s/p removal Dr. Alvarez, c/b laryngeal inflammation with altered voice,  came to ED for c/o  sudden onset right lower chest pain/RUQ pain associated with inability to take a deep breath. It started last night. Pain was severe and was not relieved with hydrocodone. Also, associated with SOB.  No fevers, chills, sweats, weight loss, fatigue, or malaise.  Pt has been mostly sedentary in the last one month or so as she had 3 surgeries back to back.  Denies any leg pain.  CTA abdo/chest in ED showed PE with right heart strain.   Pt feeling better at this time with less sob but pleuritic chest pain on right side present. She is on 2 L of O2 via nc.  she was seen by ICU service also. (01 Aug 2018 08:43)      Patient is a 78y old  Female who presents with a chief complaint of SOB, RUQ pain (01 Aug 2018 10:36)  pt found to have on 8-1-18 Right-sided segmental pulmonary emboli and Right lower extremity below the knee deep vein thrombosis.    Consulted by Dr. Marina   for VTE prophylaxis, risk stratification, and anticoagulation management.    PAST MEDICAL & SURGICAL HISTORY:  Hypovitaminosis D  Renal cyst  Hypercholesterolemia  Spinal stenosis  Obesity  Poliomyelitis  Arytenoid finding: left arytenoid and vocal fold hematoma secondary to difficult intubation  Chronic back pain  Hypothyroidism  DM (diabetes mellitus)  Hypertension  History of tonsillectomy  Bartholin cyst  Ectopic pregnancy  Abscess of breast: drainage of abscess left breast  S/P shoulder replacement, left: partial  Encounter for removal of ureteral stent  S/P cholecystectomy  CrCl: 63  IMPROVE VTE Risk Score: #5    IMPROVE Bleeding Risk Score #1.5    Falls Risk:   High (x  )  Mod (  )  Low (  )  8-3-18 Pt seen at bedside on cardiac SD, OOB in chair.  Discussed her anticoagulation with UFH overlapping with coumadin.  Questions answered. Literature provided.   Informed her that her INR is 1.17 today and I will increase her dose to  10MG  tonight.   8/4/18: Patient seen at bedside OOB to chair napping. Discussed current INR 1.24 from 1.17 yesterday. O2Sat 100% on 2lnc at this time- patient comfortable.  8/5/18: Patient seen at bedside- resting. INR 1.58 from 1.24 yesterday. Will continue 10mg again today. Probably needs 7.5mg daily to maintain therapeutic INR but will await tomorrow's INR.  8/6: seen at bedside, no concerns, plans on f/u at Dr. Aguilar's office for INR's.  8/8/18: Pt seen at bedside, OOB to chair. She continues to be [facility]. Discussed her INR result of 1.97 today. Pt states she is frustrated with her INR result, and states want to go home. Discussed options of bridging with lovenox and she states she is comfortable with self injections.     FAMILY HISTORY:  Family history of hypertension (Father, Mother)  Family history of diabetes mellitus (Sibling, Grandparent): Brother and grandmother    Denies any personal or familial history of clotting or bleeding disorders.    Allergies    Ammoniated Mercury (Rash)  Keflex (Pruritus)  penicillin G potassium (Pruritus; Rash)  Victoza (Other)    Intolerances    REVIEW OF SYSTEMS    (  )Fever	     (  )Constipation	(  )SOB				(  )Headache	(  )Dysuria  (  )Chills	     (  )Melena	(  )Dyspnea present on exertion	                    (  )Dizziness                    (  )Polyuria  (  )Nausea	     (  )Hematochezia	(  )Cough			                    (  )Syncope   	(  )Hematuria  (  )Vomiting    (  )Chest Pain	(  )Wheezing			(  )Weakness  (  )Diarrhea     (  )Palpitations	(  )Anorexia			(  )Myalgia    All other review of systems negative: Yes    PHYSICAL EXAM:    Constitutional: Appears Well    Neurological: A& O x 3    Skin: Warm    Respiratory and Thorax: normal effort; Breath sounds: normal; diminished  	  Cardiovascular: S1, S2, regular, NMBR	    Gastrointestinal: BS + x 4Q, nontender	    Genitourinary:  Bladder nondistended, nontender    Musculoskeletal:   General Right:   no muscle/joint tenderness,   normal tone, no joint swelling,   ROM: full	    General Left:   no muscle/joint tenderness,   normal tone, no joint swelling,   ROM: full    Lower extrems:   Right: no calf tenderness              negative kathe's sign               + pedal pulses    Left:   no calf tenderness              negative kathe's sign               + pedal pulses, + edema noted 1-2+                          11.5   6.43  )-----------( 172      ( 08 Aug 2018 04:39 )             34.4       08-07    144  |  107  |  19  ----------------------------<  126<H>  4.0   |  30  |  1.03    Ca    8.7      07 Aug 2018 05:03    PTT - ( 07 Aug 2018 05:03 )  PTT:97.3 sec  PTT - ( 08 Aug 2018 04:39 )  PTT:98.0 sec    PT/INR - ( 08 Aug 2018 04:39 )   PT: 21.6 sec;   INR: 1.97 ratio    PT/INR - ( 07 Aug 2018 05:03 )   PT: 21.4 sec;   INR: 1.95 ratio    PT/INR - ( 06 Aug 2018 06:01 )   PT: 20.1 sec;   INR: 1.84 ratio    PT/INR - ( 05 Aug 2018 05:25 )   PT: 17.2 sec;   INR: 1.58 ratio    PT/INR - ( 04 Aug 2018 05:21 )   PT: 13.4 sec;   INR: 1.24 ratio           PT/INR - ( 05 Aug 2018 05:25 )   PT: 17.2 sec;   INR: 1.58 ratio   PT/INR - ( 04 Aug 2018 05:21 )   PT: 13.4 sec;   INR: 1.24 ratio    PT/INR - ( 03 Aug 2018 05:17 )   PT: 12.7 sec;   INR: 1.17 ratio  PT/INR - ( 02 Aug 2018 04:34 )   PT: 12.0 sec;   INR: 1.11 ratio           CT head 8-1-18  IMPRESSION:    No acute intracranial hemorrhage, mass effect, or midline shift. No   interval change since head CT of 8/25/15.    Mild chronic bilateral cerebral white matter microvascular changes.      CHEST: ct 8-1-18    LUNGS, LARGE AIRWAYS, PLEURA: Patent central airways. No consolidation or   pneumothorax.  No pleural effusion. Bibasilar linear atelectasis  VESSELS: Atherosclerosis. There are filling defects along the right   segmental pulmonary arteries consistent with pulmonary emboli. There may   be an additional filling defect along the left lower lobe basal segment.   No saddle embolus. The main pulmonary artery measures 3.1 cm. Suggestion   of slight bowing of the interventricular septum to the left  HEART: Heart size is normal. No pericardial effusion. Coronary   atherosclerosis  MEDIASTINUM AND SHANNAN: No lymphadenopathy.  CHEST WALL AND LOWER NECK: Within normal limits.    ABDOMEN AND PELVIS: 8-1-18    LIVER: Within normal limits.  GALLBLADDER: Cholecystectomy  SPLEEN: Within normal limits.  PANCREAS: Within normal limits.  ADRENALS: Within normal limits.  KIDNEYS/URETERS: Right renal cyst again noted. The previously seen   calculus within the left renal pelvis has passed and there is no longer   left-sided hydronephrosis or perinephric stranding. Left-sided lesions   again noted, too small to characterize. Presumed left parapelvic cysts.   The left kidney is atrophic.    BLADDER: Within normal limits.  REPRODUCTIVE ORGANS: Unremarkable    BOWEL: Limited without oral contrast. No evidence of bowel obstruction.   Normal appendix. Extensive diverticula without evidence of diverticulitis.  PERITONEUM: No ascites.  VESSELS:  Atherosclerosis  RETROPERITONEUM: No lymphadenopathy.    ABDOMINAL WALL: Tiny fat-containing umbilical hernia  BONES: Degenerative changes    IMPRESSION:    Right-sided segmental pulmonary emboli. Suggestion of bowing of the   interventricular septum to the left suggesting right heart strain.   Correlate with echo.    	    Doppler: 8-1-18  FINDINGS:    Right lower extremity: There is thrombus in the right soleal and   posterior tibial veins as well as the distal popliteal vein. There is   normal compressibility of the  common femoral, femoral and popliteal   veins.      Left lower extremity: There is normal compressibility of the  common   femoral, femoral and popliteal veins. No calf vein thrombosis is detected.    There are bilateral popliteal cysts, measuring 4.0 x 1.0 x 2.0 cm on the   right and 5.0 x 2.1 cm on the left.    IMPRESSION:     Right lower extremity below the knee deep vein thrombosis.    MEDICATIONS  (STANDING):  aspirin enteric coated 81 milliGRAM(s) Oral daily  cholecalciferol 2000 Unit(s) Oral daily  cyanocobalamin 1000 MICROGram(s) Oral daily  dextrose 5%. 1000 milliLiter(s) (50 mL/Hr) IV Continuous <Continuous>  dextrose 50% Injectable 12.5 Gram(s) IV Push once  dextrose 50% Injectable 25 Gram(s) IV Push once  dextrose 50% Injectable 25 Gram(s) IV Push once  fluticasone propionate 50 MICROgram(s)/spray Nasal Spray 1 Spray(s) Both Nostrils two times a day  heparin  Infusion.  Unit(s)/Hr (21 mL/Hr) IV Continuous <Continuous>  insulin glargine Injectable (LANTUS) 20 Unit(s) SubCutaneous at bedtime  insulin lispro (HumaLOG) corrective regimen sliding scale   SubCutaneous three times a day before meals  insulin lispro (HumaLOG) corrective regimen sliding scale   SubCutaneous at bedtime  levothyroxine 88 MICROGram(s) Oral daily  losartan 25 milliGRAM(s) Oral daily  metoprolol succinate ER 25 milliGRAM(s) Oral daily  multivitamin/minerals 1 Tablet(s) Oral daily  sodium chloride 0.9%. 1000 milliLiter(s) (50 mL/Hr) IV Continuous <Continuous>  warfarin 7.5 milliGRAM(s) Oral daily    Vital Signs Last 24 Hrs  T(C): 35.9 (08-08-18 @ 06:18), Max: 36.6 (08-07-18 @ 15:47)  T(F): 96.7 (08-08-18 @ 06:18), Max: 97.9 (08-07-18 @ 15:47)  HR: 70 (08-07-18 @ 21:33) (70 - 70)  BP: 146/73 (08-07-18 @ 21:33) (146/73 - 146/73)  BP(mean): 90 (08-07-18 @ 21:33) (90 - 90)  RR: 18 (08-07-18 @ 21:33) (18 - 18)  SpO2: 96% (08-07-18 @ 21:33) (96% - 96%)    DVT Prophylaxis:  LMWH                   (  )  Heparin SQ           (  )  Coumadin             (  x)  Xarelto                  (  )  Eliquis                   (  )  Venodynes           (x  )  Ambulation          (  )  UFH                       ( x )  Contraindicated  (  )
SUBJECTIVE     Patient has mild head ache   pleuritic discomfort is getting better   No sob and comfortable breathing     AST MEDICAL & SURGICAL HISTORY:  Hypovitaminosis D  Renal cyst  Hypercholesterolemia  Spinal stenosis  Obesity  Poliomyelitis  Arytenoid finding: left arytenoid and vocal fold hematoma secondary to difficult intubation  Chronic back pain  Hypothyroidism  DM (diabetes mellitus)  Hypertension  History of tonsillectomy  Bartholin cyst  Ectopic pregnancy  Abscess of breast: drainage of abscess left breast  S/P shoulder replacement, left: partial  Encounter for removal of ureteral stent  S/P cholecystectomy    OBJECTIVE   Vital Signs Last 24 Hrs  T(C): 35.9 (03 Aug 2018 05:31), Max: 36.6 (02 Aug 2018 23:14)  T(F): 96.6 (03 Aug 2018 05:31), Max: 97.8 (02 Aug 2018 23:14)  HR: 50 (03 Aug 2018 08:00) (50 - 78)  BP: 113/88 (03 Aug 2018 08:00) (101/86 - 139/77)  BP(mean): 95 (03 Aug 2018 08:00) (60 - 95)  RR: 16 (03 Aug 2018 08:00) (12 - 18)  SpO2: 100% (03 Aug 2018 08:00) (85% - 100%)    PHYSICAL EXAM:  Constitutional: , awake and alert, not in distress on nasal canula   HEENT: Normo cephalic atraumatic  Neck: Soft and supple, No J.V.D   Respiratory: vesicular breathing , No wheezing, rales or rhonchi.   Cardiovascular: S1 and S2, regular rate .   Gastrointestinal:  soft, nontender,   Extremities: No  edema or calf tenderness .  Neurological: No new  focal deficits.    MEDICATIONS  (STANDING):  aspirin enteric coated 81 milliGRAM(s) Oral daily  cholecalciferol 2000 Unit(s) Oral daily  cyanocobalamin 1000 MICROGram(s) Oral daily  dextrose 5%. 1000 milliLiter(s) (50 mL/Hr) IV Continuous <Continuous>  dextrose 50% Injectable 12.5 Gram(s) IV Push once  dextrose 50% Injectable 25 Gram(s) IV Push once  dextrose 50% Injectable 25 Gram(s) IV Push once  heparin  Infusion.  Unit(s)/Hr (21 mL/Hr) IV Continuous <Continuous>  insulin glargine Injectable (LANTUS) 20 Unit(s) SubCutaneous at bedtime  insulin lispro (HumaLOG) corrective regimen sliding scale   SubCutaneous three times a day before meals  insulin lispro (HumaLOG) corrective regimen sliding scale   SubCutaneous at bedtime  levothyroxine 88 MICROGram(s) Oral daily  losartan 100 milliGRAM(s) Oral daily  metoprolol tartrate 25 milliGRAM(s) Oral two times a day  multivitamin/minerals 1 Tablet(s) Oral daily  warfarin 10 milliGRAM(s) Oral once                            11.9   5.56  )-----------( 155      ( 03 Aug 2018 05:17 )             36.3     08-03    136  |  102  |  30<H>  ----------------------------<  106<H>  3.8   |  28  |  1.46<H>    Ca    8.8      03 Aug 2018 05:17
SUBJECTIVE     Patient seen in A.M and has mild head ache   denies any sob or cough and was kept on the 02 at night   currently feeling better with the headache .        PAST MEDICAL & SURGICAL HISTORY:  Hypovitaminosis D  Renal cyst  Hypercholesterolemia  Spinal stenosis  Obesity  Poliomyelitis  Arytenoid finding: left arytenoid and vocal fold hematoma secondary to difficult intubation  Chronic back pain  Hypothyroidism  DM (diabetes mellitus)  Hypertension  History of tonsillectomy  Bartholin cyst  Ectopic pregnancy  Abscess of breast: drainage of abscess left breast  S/P shoulder replacement, left: partial  Encounter for removal of ureteral stent  S/P cholecystectomy    OBJECTIVE   Vital Signs Last 24 Hrs  T(C): 36.3 (07 Aug 2018 10:29), Max: 37.1 (06 Aug 2018 23:57)  T(F): 97.3 (07 Aug 2018 10:29), Max: 98.7 (06 Aug 2018 23:57)  HR: 77 (06 Aug 2018 23:57) (77 - 77)  BP: 146/61 (07 Aug 2018 06:30) (131/59 - 175/61)  BP(mean): 81 (07 Aug 2018 06:30) (81 - 89)  RR: 18 (06 Aug 2018 23:57) (18 - 18)  SpO2: 100% (07 Aug 2018 06:30) (97% - 100%)    PHYSICAL EXAM:  Constitutional: , awake and alert, not in distress.  HEENT: Normo cephalic atraumatic  Neck: Soft and supple, No J.V.D   Respiratory: vesicular breathing minimally decreased breath sounds in the bases   Cardiovascular: S1 and S2, regular rate .   Gastrointestinal:  soft, nontender,   Extremities: positive for the dvt in the leg on the right side .  Neurological: No new  focal deficits.    MEDICATIONS  (STANDING):  aspirin enteric coated 81 milliGRAM(s) Oral daily  cholecalciferol 2000 Unit(s) Oral daily  cyanocobalamin 1000 MICROGram(s) Oral daily  dextrose 5%. 1000 milliLiter(s) (50 mL/Hr) IV Continuous <Continuous>  dextrose 50% Injectable 12.5 Gram(s) IV Push once  dextrose 50% Injectable 25 Gram(s) IV Push once  dextrose 50% Injectable 25 Gram(s) IV Push once  fluticasone propionate 50 MICROgram(s)/spray Nasal Spray 1 Spray(s) Both Nostrils two times a day  heparin  Infusion.  Unit(s)/Hr (21 mL/Hr) IV Continuous <Continuous>  insulin glargine Injectable (LANTUS) 20 Unit(s) SubCutaneous at bedtime  insulin lispro (HumaLOG) corrective regimen sliding scale   SubCutaneous three times a day before meals  insulin lispro (HumaLOG) corrective regimen sliding scale   SubCutaneous at bedtime  levothyroxine 88 MICROGram(s) Oral daily  losartan 25 milliGRAM(s) Oral daily  metoprolol succinate ER 25 milliGRAM(s) Oral daily  multivitamin/minerals 1 Tablet(s) Oral daily  sodium chloride 0.9%. 1000 milliLiter(s) (50 mL/Hr) IV Continuous <Continuous>  warfarin 7.5 milliGRAM(s) Oral daily                            10.3   5.41  )-----------( 161      ( 07 Aug 2018 05:03 )             31.0     08-07    144  |  107  |  19  ----------------------------<  126<H>  4.0   |  30  |  1.03    Ca    8.7      07 Aug 2018 05:03
SUBJECTIVE     Patient still has headaches and desaturates with the ambulation  with no sob at rest     PAST MEDICAL & SURGICAL HISTORY:  Hypovitaminosis D  Renal cyst  Hypercholesterolemia  Spinal stenosis  Obesity  Poliomyelitis  Arytenoid finding: left arytenoid and vocal fold hematoma secondary to difficult intubation  Chronic back pain  Hypothyroidism  DM (diabetes mellitus)  Hypertension  History of tonsillectomy  Bartholin cyst  Ectopic pregnancy  Abscess of breast: drainage of abscess left breast  S/P shoulder replacement, left: partial  Encounter for removal of ureteral stent  S/P cholecystectomy    OBJECTIVE   Vital Signs Last 24 Hrs  T(C): 35.9 (08 Aug 2018 06:18), Max: 36.6 (07 Aug 2018 15:47)  T(F): 96.7 (08 Aug 2018 06:18), Max: 97.9 (07 Aug 2018 15:47)  HR: 70 (07 Aug 2018 21:33) (70 - 70)  BP: 148/51 (08 Aug 2018 08:00) (146/73 - 148/51)  BP(mean): 75 (08 Aug 2018 08:00) (75 - 90)  RR: 18 (07 Aug 2018 21:33) (18 - 18)  SpO2: 96% (08 Aug 2018 08:00) (96% - 96%)    PHYSICAL EXAM:  Constitutional: , awake and alert, not in distress.  HEENT: Normo cephalic atraumatic  Neck: Soft and supple, No J.V.D   Respiratory: vesicular breathing improves rales at the bases   Cardiovascular: S1 and S2, regular rate .   Gastrointestinal:  soft, nontender,   Extremities: mild edema of the feet with the dvt in the right leg   Neurological: No new  focal deficits.    MEDICATIONS  (STANDING):  aspirin enteric coated 81 milliGRAM(s) Oral daily  cholecalciferol 2000 Unit(s) Oral daily  cyanocobalamin 1000 MICROGram(s) Oral daily  dextrose 5%. 1000 milliLiter(s) (50 mL/Hr) IV Continuous <Continuous>  dextrose 50% Injectable 12.5 Gram(s) IV Push once  dextrose 50% Injectable 25 Gram(s) IV Push once  dextrose 50% Injectable 25 Gram(s) IV Push once  fluticasone propionate 50 MICROgram(s)/spray Nasal Spray 1 Spray(s) Both Nostrils two times a day  heparin  Infusion.  Unit(s)/Hr (21 mL/Hr) IV Continuous <Continuous>  insulin glargine Injectable (LANTUS) 20 Unit(s) SubCutaneous at bedtime  insulin lispro (HumaLOG) corrective regimen sliding scale   SubCutaneous three times a day before meals  insulin lispro (HumaLOG) corrective regimen sliding scale   SubCutaneous at bedtime  levothyroxine 88 MICROGram(s) Oral daily  losartan 25 milliGRAM(s) Oral daily  metoprolol succinate ER 25 milliGRAM(s) Oral daily  multivitamin/minerals 1 Tablet(s) Oral daily  sodium chloride 0.9%. 1000 milliLiter(s) (50 mL/Hr) IV Continuous <Continuous>  warfarin 7.5 milliGRAM(s) Oral daily                            11.5   6.43  )-----------( 172      ( 08 Aug 2018 04:39 )             34.4     08-07    144  |  107  |  19  ----------------------------<  126<H>  4.0   |  30  |  1.03    Ca    8.7      07 Aug 2018 05:03
SUBJECTIVE     patient seen in A.M and breathing comfortably with out sob seen sitting in chair       PAST MEDICAL & SURGICAL HISTORY:  Hypovitaminosis D  Renal cyst  Hypercholesterolemia  Spinal stenosis  Obesity  Poliomyelitis  Arytenoid finding: left arytenoid and vocal fold hematoma secondary to difficult intubation  Chronic back pain  Hypothyroidism  DM (diabetes mellitus)  Hypertension  History of tonsillectomy  Bartholin cyst  Ectopic pregnancy  Abscess of breast: drainage of abscess left breast  S/P shoulder replacement, left: partial  Encounter for removal of ureteral stent  S/P cholecystectomy    OBJECTIVE   Vital Signs Last 24 Hrs  T(C): 36.5 (06 Aug 2018 16:16), Max: 36.8 (06 Aug 2018 12:29)  T(F): 97.7 (06 Aug 2018 16:16), Max: 98.2 (06 Aug 2018 12:29)  HR: 55 (06 Aug 2018 07:00) (55 - 66)  BP: 175/61 (06 Aug 2018 17:17) (115/44 - 175/61)  BP(mean): 89 (06 Aug 2018 17:17) (60 - 94)  RR: 14 (06 Aug 2018 07:00) (11 - 17)  SpO2: 92% (06 Aug 2018 07:00) (92% - 99%)    PHYSICAL EXAM:  Constitutional: , awake and alert, not in distress.  HEENT: Normo cephalic atraumatic  Neck: Soft and supple, No J.V.D   Respiratory: vesicular breathing , No wheezing, rales or rhonchi.   Cardiovascular: S1 and S2, regular rate .   Gastrointestinal:  soft, nontender,   Extremities: positive for the dvt   Neurological: No new  focal deficits.    MEDICATIONS  (STANDING):  aspirin enteric coated 81 milliGRAM(s) Oral daily  cholecalciferol 2000 Unit(s) Oral daily  cyanocobalamin 1000 MICROGram(s) Oral daily  dextrose 5%. 1000 milliLiter(s) (50 mL/Hr) IV Continuous <Continuous>  dextrose 50% Injectable 12.5 Gram(s) IV Push once  dextrose 50% Injectable 25 Gram(s) IV Push once  dextrose 50% Injectable 25 Gram(s) IV Push once  fluticasone propionate 50 MICROgram(s)/spray Nasal Spray 1 Spray(s) Both Nostrils two times a day  heparin  Infusion.  Unit(s)/Hr (21 mL/Hr) IV Continuous <Continuous>  insulin glargine Injectable (LANTUS) 20 Unit(s) SubCutaneous at bedtime  insulin lispro (HumaLOG) corrective regimen sliding scale   SubCutaneous three times a day before meals  insulin lispro (HumaLOG) corrective regimen sliding scale   SubCutaneous at bedtime  levothyroxine 88 MICROGram(s) Oral daily  losartan 25 milliGRAM(s) Oral daily  multivitamin/minerals 1 Tablet(s) Oral daily  sodium chloride 0.9%. 1000 milliLiter(s) (50 mL/Hr) IV Continuous <Continuous>  warfarin 7.5 milliGRAM(s) Oral daily                            10.7   5.60  )-----------( 152      ( 06 Aug 2018 06:01 )             32.6     08-06    144  |  108  |  16  ----------------------------<  120<H>  4.1   |  28  |  0.90    Ca    8.5      06 Aug 2018 06:01
Subjective:  Patient is a 78y old  Female who presents with a chief complaint of SOB, RUQ pain     HPI:      78 / F with PMHx HTN, DM2, hypothyroidism, chronic back pain, urolithiasis 9mm s/p stent then lithotripsy then  s/p stent s/p removal Dr. Alvarez, c/b laryngeal inflammation with altered voice admitted on 8/1/18 with c/o  sudden onset right lower chest pain/RUQ pain associated with inability to take a deep breath. It started suddenly at  night. Pain was severe and was not relieved with hydrocodone. Also, associated with SOB.  No fevers, chills, sweats, weight loss, fatigue, or malaise.  Pt has been mostly sedentary in the last one month or so as she had 3 surgeries back to back. Denies any leg pain.  CTA abdo/chest in ED showed PE with right heart strain.   Pt feeling better at this time with less sob but pleuritic chest pain on right side present. She is on 2 L of O2 via nc.  she was seen by ICU service also.     8/2/18 - Patient seen and examined at bedside earlier today, reports headaches and frontal headaches, + reports nasal congestion, tylenol not helpful , + dyspnea improving, denies leg pains, no signs of bleeding     Review of system- Rest of the review of system are negative except mentioned in HPI    OBJECTIVE:   T(C): 36.3 (08-02-18 @ 09:23), Max: 36.8 (08-01-18 @ 23:05)  HR: 52 (08-02-18 @ 10:00) (51 - 77)  BP: 108/46 (08-02-18 @ 10:00) (98/62 - 122/45)  RR: 13 (08-02-18 @ 10:00) (13 - 14)  SpO2: 98% (08-02-18 @ 10:00) (94% - 99%)  Wt(kg): --  Daily     Daily     PHYSICAL EXAM:  GENERAL: NAD  NERVOUS SYSTEM:  Alert & Oriented X3, non- focal exam  HEAD:  Atraumatic, Normocephalic, tenderness over frontal sinuses   EYES: EOMI, PERRLA, conjunctiva and sclera clear  HEENT: Moist mucous membranes  NECK: Supple, No JVD  CHEST/LUNG: Clear to auscultation bilaterally; No rales, no rhonchi, no wheezing, or rubs  HEART: Regular rate and rhythm; No murmurs, rubs, or gallops  ABDOMEN: Soft, Nontender, Nondistended; Bowel sounds present  GENITOURINARY- Voiding, no suprapubic tenderness  EXTREMITIES:  2+ Peripheral Pulses, No clubbing, cyanosis, or edema  MUSCULOSKELETAL:- No muscle tenderness, Muscle tone normal, No joint tenderness, no Joint swelling, Joint range of motion-normal  SKIN-no rash, no lesion    LABS:                        10.4   6.52  )-----------( 169      ( 02 Aug 2018 04:34 )             32.0     08-02    138  |  106  |  16  ----------------------------<  105<H>  3.5   |  29  |  1.09    Ca    8.3<L>      02 Aug 2018 04:34    TPro  6.9  /  Alb  3.5  /  TBili  0.4  /  DBili  x   /  AST  13<L>  /  ALT  22  /  AlkPhos  98  07-31    PT/INR - ( 02 Aug 2018 04:34 )   PT: 12.0 sec;   INR: 1.11 ratio         PTT - ( 02 Aug 2018 11:14 )  PTT:65.4 sec  CARDIAC MARKERS ( 31 Jul 2018 22:55 )  <0.015 ng/mL / x     / x     / x     / x        CAPILLARY BLOOD GLUCOSE  POCT Blood Glucose.: 155 mg/dL (02 Aug 2018 11:30)  POCT Blood Glucose.: 141 mg/dL (02 Aug 2018 08:23)  POCT Blood Glucose.: 147 mg/dL (01 Aug 2018 21:56)  POCT Blood Glucose.: 163 mg/dL (01 Aug 2018 17:26)  RECENT CULTURES:    RADIOLOGY & ADDITIONAL TESTS:  < from: US Duplex Venous Lower Ext Complete, Bilateral (08.01.18 @ 13:47) >    Right lower extremity: There is thrombus in the right soleal and   posterior tibial veins as well as the distal popliteal vein. There is   normal compressibility of the  common femoral, femoral and popliteal   veins.      Left lower extremity: There is normal compressibility of the  common   femoral, femoral and popliteal veins. No calf vein thrombosis is detected.    There are bilateral popliteal cysts, measuring 4.0 x 1.0 x 2.0 cm on the   right and 5.0 x 2.1 cm on the left.    IMPRESSION:     Right lower extremity below the knee deep vein thrombosis.    < from: CT Abdomen and Pelvis w/ IV Cont (08.01.18 @ 01:06) >  IMPRESSION:    Right-sided segmental pulmonary emboli. Suggestion of bowing of the   interventricular septum to the left suggesting right heart strain.   Correlate with echo.    < end of copied text >  < from: Xray Chest 1 View AP/PA. (07.31.18 @ 23:32) >  The lungs are clear.  No pleural abnormality is seen.      Cardiomediastinal silhouette is intact.    < end of copied text >    < from: 12 Lead ECG (07.31.18 @ 22:25) >    Diagnosis Line Normal sinus rhythm  Right bundle branch block  Abnormal ECG  When compared with ECG of 22-JUN-2018 17:24,  T wave inversion no longer evident in Lateral leads    < end of copied text >  < from: Transthoracic Echocardiogram (08.01.18 @ 09:23) >  The left ventricle is normal in size, wall motion and contractility.   Mild concentric left ventricular hypertrophy is present.   Estimated left ventricular ejection fraction is 60-65 %.   The right ventricle is mildly dilated, with grossly preserved function   Mild aortic sclerosis is present with normal valvular opening.   Normal appearing mitral valve structure and function.   EA reversal of the mitral inflow consistent with reduced compliance of   the   left ventricle.   Mild (1+) mitral regurgitation is present.   Mild mitral annular calcification is present.    < end of copied text >    Current medications:  acetaminophen   Tablet. 650 milliGRAM(s) Oral every 6 hours PRN  aspirin enteric coated 81 milliGRAM(s) Oral daily  cholecalciferol 2000 Unit(s) Oral daily  cyanocobalamin 1000 MICROGram(s) Oral daily  dextrose 40% Gel 15 Gram(s) Oral once PRN  dextrose 5%. 1000 milliLiter(s) IV Continuous <Continuous>  dextrose 50% Injectable 12.5 Gram(s) IV Push once  dextrose 50% Injectable 25 Gram(s) IV Push once  dextrose 50% Injectable 25 Gram(s) IV Push once  glucagon  Injectable 1 milliGRAM(s) IntraMuscular once PRN  heparin  Infusion.  Unit(s)/Hr IV Continuous <Continuous>  heparin  Injectable 9500 Unit(s) IV Push every 6 hours PRN  heparin  Injectable 4500 Unit(s) IV Push every 6 hours PRN  insulin glargine Injectable (LANTUS) 20 Unit(s) SubCutaneous at bedtime  insulin lispro (HumaLOG) corrective regimen sliding scale   SubCutaneous three times a day before meals  insulin lispro (HumaLOG) corrective regimen sliding scale   SubCutaneous at bedtime  levothyroxine 88 MICROGram(s) Oral daily  losartan 100 milliGRAM(s) Oral daily  metoprolol tartrate 50 milliGRAM(s) Oral every 12 hours  multivitamin/minerals 1 Tablet(s) Oral daily  ondansetron Injectable 4 milliGRAM(s) IV Push every 6 hours PRN  warfarin 5 milliGRAM(s) Oral daily
Subjective:  Patient is a 78y old  Female who presents with a chief complaint of SOB, RUQ pain     HPI:      78 / F with PMHx HTN, DM2, hypothyroidism, chronic back pain, urolithiasis 9mm s/p stent then lithotripsy then  s/p stent s/p removal Dr. Alvarez, c/b laryngeal inflammation with altered voice admitted on 8/1/18 with c/o  sudden onset right lower chest pain/RUQ pain associated with inability to take a deep breath. It started suddenly at  night. Pain was severe and was not relieved with hydrocodone. Also, associated with SOB.  No fevers, chills, sweats, weight loss, fatigue, or malaise.  Pt has been mostly sedentary in the last one month or so as she had 3 surgeries back to back. Denies any leg pain.  CTA abdo/chest in ED showed PE with right heart strain.   Pt feeling better at this time with less sob but pleuritic chest pain on right side present. She is on 2 L of O2 via nc.  she was seen by ICU service also.     8/2/18 - Patient seen and examined at bedside earlier today, reports headaches and frontal headaches, + reports nasal congestion, tylenol not helpful , + dyspnea improving, denies leg pains, no signs of bleeding   8/3/18 - pt seen and examined, report nasal congestion, + frontal headaches, fiorecet effective, denies Cp, dyspnea better, no  abdominal pain, palpitations     Review of system- Rest of the review of system are negative except mentioned in HPI    OBJECTIVE:   T(C): 36.7 (08-03-18 @ 09:44), Max: 36.7 (08-03-18 @ 09:44)  T(F): 98 (08-03-18 @ 09:44), Max: 98 (08-03-18 @ 09:44)  HR: 64 (08-03-18 @ 16:00) (50 - 78)  BP: 112/56 (08-03-18 @ 16:00) (97/83 - 139/77)  RR: 17 (08-03-18 @ 16:00) (12 - 18)  SpO2: 100% (08-03-18 @ 16:00) (91% - 100%)  Wt(kg): --    PHYSICAL EXAM:  GENERAL: NAD  NERVOUS SYSTEM:  Alert & Oriented X3, non- focal exam  HEAD:  Atraumatic, Normocephalic, tenderness over frontal sinuses   EYES: EOMI, PERRLA, conjunctiva and sclera clear  HEENT: Moist mucous membranes  NECK: Supple, No JVD  CHEST/LUNG: Clear to auscultation bilaterally; No rales, no rhonchi, no wheezing, or rubs  HEART: Regular rate and rhythm; No murmurs, rubs, or gallops  ABDOMEN: Soft, Nontender, Nondistended; Bowel sounds present  GENITOURINARY- Voiding, no suprapubic tenderness  EXTREMITIES:  2+ Peripheral Pulses, No clubbing, cyanosis, or edema  MUSCULOSKELETAL:- No muscle tenderness, Muscle tone normal, No joint tenderness, no Joint swelling, Joint range of motion-normal  SKIN-no rash, no lesion    LABS:  08-03    136  |  102  |  30<H>  ----------------------------<  106<H>  3.8   |  28  |  1.46<H>    Ca    8.8      03 Aug 2018 05:17                              11.9   5.56  )-----------( 155      ( 03 Aug 2018 05:17 )             36.3       CARDIAC MARKERS ( 03 Aug 2018 05:17 )  <0.015 ng/mL / x     / x     / x     / x          PT/INR - ( 03 Aug 2018 05:17 )   PT: 12.7 sec;   INR: 1.17 ratio         PTT - ( 03 Aug 2018 05:17 )  PTT:82.3 sec                            10.4   6.52  )-----------( 169      ( 02 Aug 2018 04:34 )             32.0     08-02    138  |  106  |  16  ----------------------------<  105<H>  3.5   |  29  |  1.09    Ca    8.3<L>      02 Aug 2018 04:34    TPro  6.9  /  Alb  3.5  /  TBili  0.4  /  DBili  x   /  AST  13<L>  /  ALT  22  /  AlkPhos  98  07-31    PT/INR - ( 02 Aug 2018 04:34 )   PT: 12.0 sec;   INR: 1.11 ratio         PTT - ( 02 Aug 2018 11:14 )  PTT:65.4 sec  CARDIAC MARKERS ( 31 Jul 2018 22:55 )  <0.015 ng/mL / x     / x     / x     / x        CAPILLARY BLOOD GLUCOSE  POCT Blood Glucose.: 155 mg/dL (02 Aug 2018 11:30)  POCT Blood Glucose.: 141 mg/dL (02 Aug 2018 08:23)  POCT Blood Glucose.: 147 mg/dL (01 Aug 2018 21:56)  POCT Blood Glucose.: 163 mg/dL (01 Aug 2018 17:26)  RECENT CULTURES:    RADIOLOGY & ADDITIONAL TESTS:  < from: CT Sinuses No Cont (08.02.18 @ 12:44) >    No evidence of inflammatory disease in the paranasal sinuses or their   respective drainage pathways.     < end of copied text >  < from: CT Head No Cont (08.02.18 @ 12:42) >  IMPRESSION:    No acute intracranial hemorrhage, mass effect, or midline shift. No   interval change since head CT of 8/25/15.    Mild chronic bilateral cerebral white matter microvascular changes.      < end of copied text >    < from: US Duplex Venous Lower Ext Complete, Bilateral (08.01.18 @ 13:47) >    Right lower extremity: There is thrombus in the right soleal and   posterior tibial veins as well as the distal popliteal vein. There is   normal compressibility of the  common femoral, femoral and popliteal   veins.      Left lower extremity: There is normal compressibility of the  common   femoral, femoral and popliteal veins. No calf vein thrombosis is detected.    There are bilateral popliteal cysts, measuring 4.0 x 1.0 x 2.0 cm on the   right and 5.0 x 2.1 cm on the left.    IMPRESSION:     Right lower extremity below the knee deep vein thrombosis.    < from: CT Abdomen and Pelvis w/ IV Cont (08.01.18 @ 01:06) >  IMPRESSION:    Right-sided segmental pulmonary emboli. Suggestion of bowing of the   interventricular septum to the left suggesting right heart strain.   Correlate with echo.    < end of copied text >  < from: Xray Chest 1 View AP/PA. (07.31.18 @ 23:32) >  The lungs are clear.  No pleural abnormality is seen.      Cardiomediastinal silhouette is intact.    < end of copied text >    < from: 12 Lead ECG (07.31.18 @ 22:25) >    Diagnosis Line Normal sinus rhythm  Right bundle branch block  Abnormal ECG  When compared with ECG of 22-JUN-2018 17:24,  T wave inversion no longer evident in Lateral leads    < end of copied text >  < from: Transthoracic Echocardiogram (08.01.18 @ 09:23) >  The left ventricle is normal in size, wall motion and contractility.   Mild concentric left ventricular hypertrophy is present.   Estimated left ventricular ejection fraction is 60-65 %.   The right ventricle is mildly dilated, with grossly preserved function   Mild aortic sclerosis is present with normal valvular opening.   Normal appearing mitral valve structure and function.   EA reversal of the mitral inflow consistent with reduced compliance of   the   left ventricle.   Mild (1+) mitral regurgitation is present.   Mild mitral annular calcification is present.    < end of copied text >    MEDICATIONS  (STANDING):  aspirin enteric coated 81 milliGRAM(s) Oral daily  cholecalciferol 2000 Unit(s) Oral daily  cyanocobalamin 1000 MICROGram(s) Oral daily  dextrose 5%. 1000 milliLiter(s) (50 mL/Hr) IV Continuous <Continuous>  dextrose 50% Injectable 12.5 Gram(s) IV Push once  dextrose 50% Injectable 25 Gram(s) IV Push once  dextrose 50% Injectable 25 Gram(s) IV Push once  fluticasone propionate 50 MICROgram(s)/spray Nasal Spray 1 Spray(s) Both Nostrils two times a day  heparin  Infusion.  Unit(s)/Hr (21 mL/Hr) IV Continuous <Continuous>  insulin glargine Injectable (LANTUS) 20 Unit(s) SubCutaneous at bedtime  insulin lispro (HumaLOG) corrective regimen sliding scale   SubCutaneous three times a day before meals  insulin lispro (HumaLOG) corrective regimen sliding scale   SubCutaneous at bedtime  levothyroxine 88 MICROGram(s) Oral daily  metoprolol tartrate 25 milliGRAM(s) Oral two times a day  multivitamin/minerals 1 Tablet(s) Oral daily  sodium chloride 0.9%. 1000 milliLiter(s) (50 mL/Hr) IV Continuous <Continuous>  warfarin 10 milliGRAM(s) Oral once    MEDICATIONS  (PRN):  acetaminophen   Tablet. 650 milliGRAM(s) Oral every 6 hours PRN Mild Pain (1 - 3)  acetaminophen 325 mG/butalbital 50 mG/caffeine 40 mG 1 Tablet(s) Oral every 6 hours PRN migraine  dextrose 40% Gel 15 Gram(s) Oral once PRN Blood Glucose LESS THAN 70 milliGRAM(s)/deciliter  glucagon  Injectable 1 milliGRAM(s) IntraMuscular once PRN Glucose LESS THAN 70 milligrams/deciliter  heparin  Injectable 9500 Unit(s) IV Push every 6 hours PRN For aPTT less than 40  heparin  Injectable 4500 Unit(s) IV Push every 6 hours PRN For aPTT between 40 - 57  ondansetron Injectable 4 milliGRAM(s) IV Push every 6 hours PRN Nausea and/or Vomiting  sodium chloride 0.65% Nasal 1 Spray(s) Both Nostrils every 2 hours PRN Nasal Congestion
Subjective:  Patient is a 78y old  Female who presents with a chief complaint of SOB, RUQ pain     HPI:      78 / F with PMHx HTN, DM2, hypothyroidism, chronic back pain, urolithiasis 9mm s/p stent then lithotripsy then  s/p stent s/p removal Dr. Alvarez, c/b laryngeal inflammation with altered voice admitted on 8/1/18 with c/o  sudden onset right lower chest pain/RUQ pain associated with inability to take a deep breath. It started suddenly at  night. Pain was severe and was not relieved with hydrocodone. Also, associated with SOB.  No fevers, chills, sweats, weight loss, fatigue, or malaise.  Pt has been mostly sedentary in the last one month or so as she had 3 surgeries back to back. Denies any leg pain.  CTA abdo/chest in ED showed PE with right heart strain.   Pt feeling better at this time with less sob but pleuritic chest pain on right side present. She is on 2 L of O2 via nc.  she was seen by ICU service also.     8/2/18 - Patient seen and examined at bedside earlier today, reports headaches and frontal headaches, + reports nasal congestion, tylenol not helpful , + dyspnea improving, denies leg pains, no signs of bleeding   8/3/18 - pt seen and examined, report nasal congestion, + frontal headaches, fiorecet effective, denies Cp, dyspnea better, no  abdominal pain, palpitations   8/4/18 - pt seen and examined, feels better, denies dyspnea, on O2 , reports low P2 sats overnight, denies CP     Review of system- Rest of the review of system are negative except mentioned in HPI    T(C): 36.4 (08-04-18 @ 17:22), Max: 36.4 (08-04-18 @ 17:22)  T(F): 97.5 (08-04-18 @ 17:22), Max: 97.5 (08-04-18 @ 17:22)  HR: 65 (08-04-18 @ 18:00) (51 - 68)  BP: 140/59 (08-04-18 @ 16:00) (106/45 - 146/50)  RR: 14 (08-04-18 @ 18:00) (12 - 18)  SpO2: 96% (08-04-18 @ 18:00) (80% - 100%)  Wt(kg): --    PHYSICAL EXAM:  GENERAL: NAD  NERVOUS SYSTEM:  Alert & Oriented X3, non- focal exam  HEAD:  Atraumatic, Normocephalic, tenderness over frontal sinuses   EYES: EOMI, PERRLA, conjunctiva and sclera clear  HEENT: Moist mucous membranes  NECK: Supple, No JVD  CHEST/LUNG: Clear to auscultation bilaterally; No rales, no rhonchi, no wheezing, or rubs  HEART: Regular rate and rhythm; No murmurs, rubs, or gallops  ABDOMEN: Soft, Nontender, Nondistended; Bowel sounds present  GENITOURINARY- Voiding, no suprapubic tenderness  EXTREMITIES:  2+ Peripheral Pulses, No clubbing, cyanosis, or edema  MUSCULOSKELETAL:- No muscle tenderness, Muscle tone normal, No joint tenderness, no Joint swelling, Joint range of motion-normal  SKIN-no rash, no lesion    LABS:  08-04    141  |  105  |  28<H>  ----------------------------<  117<H>  3.9   |  29  |  1.16    Ca    8.6      04 Aug 2018 05:21                         10.6   5.94  )-----------( 163      ( 04 Aug 2018 05:21 )             31.6     CARDIAC MARKERS ( 03 Aug 2018 05:17 )  <0.015 ng/mL / x     / x     / x     / x          PT/INR - ( 04 Aug 2018 05:21 )   PT: 13.4 sec;   INR: 1.24 ratio         PTT - ( 04 Aug 2018 05:21 )  PTT:63.6 sec      08-03    136  |  102  |  30<H>  ----------------------------<  106<H>  3.8   |  28  |  1.46<H>    Ca    8.8      03 Aug 2018 05:17                              11.9   5.56  )-----------( 155      ( 03 Aug 2018 05:17 )             36.3       CARDIAC MARKERS ( 03 Aug 2018 05:17 )  <0.015 ng/mL / x     / x     / x     / x          PT/INR - ( 03 Aug 2018 05:17 )   PT: 12.7 sec;   INR: 1.17 ratio         PTT - ( 03 Aug 2018 05:17 )  PTT:82.3 sec                            10.4   6.52  )-----------( 169      ( 02 Aug 2018 04:34 )             32.0     08-02    138  |  106  |  16  ----------------------------<  105<H>  3.5   |  29  |  1.09    Ca    8.3<L>      02 Aug 2018 04:34    TPro  6.9  /  Alb  3.5  /  TBili  0.4  /  DBili  x   /  AST  13<L>  /  ALT  22  /  AlkPhos  98  07-31    PT/INR - ( 02 Aug 2018 04:34 )   PT: 12.0 sec;   INR: 1.11 ratio         PTT - ( 02 Aug 2018 11:14 )  PTT:65.4 sec  CARDIAC MARKERS ( 31 Jul 2018 22:55 )  <0.015 ng/mL / x     / x     / x     / x        CAPILLARY BLOOD GLUCOSE  POCT Blood Glucose.: 155 mg/dL (02 Aug 2018 11:30)  POCT Blood Glucose.: 141 mg/dL (02 Aug 2018 08:23)  POCT Blood Glucose.: 147 mg/dL (01 Aug 2018 21:56)  POCT Blood Glucose.: 163 mg/dL (01 Aug 2018 17:26)  RECENT CULTURES:    RADIOLOGY & ADDITIONAL TESTS:  < from: CT Sinuses No Cont (08.02.18 @ 12:44) >    No evidence of inflammatory disease in the paranasal sinuses or their   respective drainage pathways.     < end of copied text >  < from: CT Head No Cont (08.02.18 @ 12:42) >  IMPRESSION:    No acute intracranial hemorrhage, mass effect, or midline shift. No   interval change since head CT of 8/25/15.    Mild chronic bilateral cerebral white matter microvascular changes.      < end of copied text >    < from: US Duplex Venous Lower Ext Complete, Bilateral (08.01.18 @ 13:47) >    Right lower extremity: There is thrombus in the right soleal and   posterior tibial veins as well as the distal popliteal vein. There is   normal compressibility of the  common femoral, femoral and popliteal   veins.      Left lower extremity: There is normal compressibility of the  common   femoral, femoral and popliteal veins. No calf vein thrombosis is detected.    There are bilateral popliteal cysts, measuring 4.0 x 1.0 x 2.0 cm on the   right and 5.0 x 2.1 cm on the left.    IMPRESSION:     Right lower extremity below the knee deep vein thrombosis.    < from: CT Abdomen and Pelvis w/ IV Cont (08.01.18 @ 01:06) >  IMPRESSION:    Right-sided segmental pulmonary emboli. Suggestion of bowing of the   interventricular septum to the left suggesting right heart strain.   Correlate with echo.    < end of copied text >  < from: Xray Chest 1 View AP/PA. (07.31.18 @ 23:32) >  The lungs are clear.  No pleural abnormality is seen.      Cardiomediastinal silhouette is intact.    < end of copied text >    < from: 12 Lead ECG (07.31.18 @ 22:25) >    Diagnosis Line Normal sinus rhythm  Right bundle branch block  Abnormal ECG  When compared with ECG of 22-JUN-2018 17:24,  T wave inversion no longer evident in Lateral leads    < end of copied text >  < from: Transthoracic Echocardiogram (08.01.18 @ 09:23) >  The left ventricle is normal in size, wall motion and contractility.   Mild concentric left ventricular hypertrophy is present.   Estimated left ventricular ejection fraction is 60-65 %.   The right ventricle is mildly dilated, with grossly preserved function   Mild aortic sclerosis is present with normal valvular opening.   Normal appearing mitral valve structure and function.   EA reversal of the mitral inflow consistent with reduced compliance of   the   left ventricle.   Mild (1+) mitral regurgitation is present.   Mild mitral annular calcification is present.    < end of copied text >    MEDICATIONS  (STANDING):  aspirin enteric coated 81 milliGRAM(s) Oral daily  cholecalciferol 2000 Unit(s) Oral daily  cyanocobalamin 1000 MICROGram(s) Oral daily  dextrose 5%. 1000 milliLiter(s) (50 mL/Hr) IV Continuous <Continuous>  dextrose 50% Injectable 12.5 Gram(s) IV Push once  dextrose 50% Injectable 25 Gram(s) IV Push once  dextrose 50% Injectable 25 Gram(s) IV Push once  fluticasone propionate 50 MICROgram(s)/spray Nasal Spray 1 Spray(s) Both Nostrils two times a day  heparin  Infusion.  Unit(s)/Hr (21 mL/Hr) IV Continuous <Continuous>  insulin glargine Injectable (LANTUS) 20 Unit(s) SubCutaneous at bedtime  insulin lispro (HumaLOG) corrective regimen sliding scale   SubCutaneous three times a day before meals  insulin lispro (HumaLOG) corrective regimen sliding scale   SubCutaneous at bedtime  levothyroxine 88 MICROGram(s) Oral daily  metoprolol tartrate 25 milliGRAM(s) Oral two times a day  multivitamin/minerals 1 Tablet(s) Oral daily  sodium chloride 0.9%. 1000 milliLiter(s) (50 mL/Hr) IV Continuous <Continuous>  warfarin 10 milliGRAM(s) Oral once    MEDICATIONS  (PRN):  acetaminophen   Tablet. 650 milliGRAM(s) Oral every 6 hours PRN Mild Pain (1 - 3)  acetaminophen 325 mG/butalbital 50 mG/caffeine 40 mG 1 Tablet(s) Oral every 6 hours PRN migraine  dextrose 40% Gel 15 Gram(s) Oral once PRN Blood Glucose LESS THAN 70 milliGRAM(s)/deciliter  glucagon  Injectable 1 milliGRAM(s) IntraMuscular once PRN Glucose LESS THAN 70 milligrams/deciliter  heparin  Injectable 9500 Unit(s) IV Push every 6 hours PRN For aPTT less than 40  heparin  Injectable 4500 Unit(s) IV Push every 6 hours PRN For aPTT between 40 - 57  ondansetron Injectable 4 milliGRAM(s) IV Push every 6 hours PRN Nausea and/or Vomiting  sodium chloride 0.65% Nasal 1 Spray(s) Both Nostrils every 2 hours PRN Nasal Congestion
Subjective:  Patient is a 78y old  Female who presents with a chief complaint of SOB, RUQ pain     HPI:      78 / F with PMHx HTN, DM2, hypothyroidism, chronic back pain, urolithiasis 9mm s/p stent then lithotripsy then  s/p stent s/p removal Dr. Alvarez, c/b laryngeal inflammation with altered voice admitted on 8/1/18 with c/o  sudden onset right lower chest pain/RUQ pain associated with inability to take a deep breath. It started suddenly at  night. Pain was severe and was not relieved with hydrocodone. Also, associated with SOB.  No fevers, chills, sweats, weight loss, fatigue, or malaise.  Pt has been mostly sedentary in the last one month or so as she had 3 surgeries back to back. Denies any leg pain.  CTA abdo/chest in ED showed PE with right heart strain.   Pt feeling better at this time with less sob but pleuritic chest pain on right side present. She is on 2 L of O2 via nc.  she was seen by ICU service also.     8/2/18 - Patient seen and examined at bedside earlier today, reports headaches and frontal headaches, + reports nasal congestion, tylenol not helpful , + dyspnea improving, denies leg pains, no signs of bleeding   8/3/18 - pt seen and examined, report nasal congestion, + frontal headaches, fiorecet effective, denies Cp, dyspnea better, no  abdominal pain, palpitations   8/4/18 - pt seen and examined, feels better, denies dyspnea, on O2 , reports low P2 sats overnight, denies CP   8/5/18 - pt seen and examined, denies CP, dyspnea , headaches at night, denies new sx , no signs of bleeding     Review of system- Rest of the review of system are negative except mentioned in HPI    T(C): 36.2 (08-05-18 @ 12:57), Max: 36.2 (08-04-18 @ 19:57)  T(F): 97.2 (08-05-18 @ 12:57), Max: 97.2 (08-05-18 @ 12:57)  HR: 65 (08-05-18 @ 18:00) (51 - 78)  BP: 138/53 (08-05-18 @ 18:00) (119/40 - 150/60)  RR: 17 (08-05-18 @ 18:00) (12 - 18)  SpO2: 94% (08-05-18 @ 18:00) (94% - 100%)  Wt(kg): --    PHYSICAL EXAM:  GENERAL: NAD  NERVOUS SYSTEM:  Alert & Oriented X3, non- focal exam  HEAD:  Atraumatic, Normocephalic, tenderness over frontal sinuses   EYES: EOMI, PERRLA, conjunctiva and sclera clear  HEENT: Moist mucous membranes  NECK: Supple, No JVD  CHEST/LUNG: Clear to auscultation bilaterally; No rales, no rhonchi, no wheezing, or rubs  HEART: Regular rate and rhythm; No murmurs, rubs, or gallops  ABDOMEN: Soft, Nontender, Nondistended; Bowel sounds present  GENITOURINARY- Voiding, no suprapubic tenderness  EXTREMITIES:  2+ Peripheral Pulses, No clubbing, cyanosis, or edema  MUSCULOSKELETAL:- No muscle tenderness, Muscle tone normal, No joint tenderness, no Joint swelling, Joint range of motion-normal  SKIN-no rash, no lesion    LABS:  08-05    145  |  108  |  19  ----------------------------<  121<H>  4.5   |  31  |  0.97    Ca    8.4<L>      05 Aug 2018 05:25                         10.6   5.63  )-----------( 161      ( 05 Aug 2018 05:25 )             32.7       PT/INR - ( 05 Aug 2018 05:25 )   PT: 17.2 sec;   INR: 1.58 ratio         PTT - ( 05 Aug 2018 05:25 )  PTT:75.8 sec      08-04    141  |  105  |  28<H>  ----------------------------<  117<H>  3.9   |  29  |  1.16    Ca    8.6      04 Aug 2018 05:21                         10.6   5.94  )-----------( 163      ( 04 Aug 2018 05:21 )             31.6     CARDIAC MARKERS ( 03 Aug 2018 05:17 )  <0.015 ng/mL / x     / x     / x     / x          PT/INR - ( 04 Aug 2018 05:21 )   PT: 13.4 sec;   INR: 1.24 ratio         PTT - ( 04 Aug 2018 05:21 )  PTT:63.6 sec      08-03    136  |  102  |  30<H>  ----------------------------<  106<H>  3.8   |  28  |  1.46<H>    Ca    8.8      03 Aug 2018 05:17                              11.9   5.56  )-----------( 155      ( 03 Aug 2018 05:17 )             36.3       CARDIAC MARKERS ( 03 Aug 2018 05:17 )  <0.015 ng/mL / x     / x     / x     / x          PT/INR - ( 03 Aug 2018 05:17 )   PT: 12.7 sec;   INR: 1.17 ratio         PTT - ( 03 Aug 2018 05:17 )  PTT:82.3 sec                            10.4   6.52  )-----------( 169      ( 02 Aug 2018 04:34 )             32.0     08-02    138  |  106  |  16  ----------------------------<  105<H>  3.5   |  29  |  1.09    Ca    8.3<L>      02 Aug 2018 04:34    TPro  6.9  /  Alb  3.5  /  TBili  0.4  /  DBili  x   /  AST  13<L>  /  ALT  22  /  AlkPhos  98  07-31    PT/INR - ( 02 Aug 2018 04:34 )   PT: 12.0 sec;   INR: 1.11 ratio         PTT - ( 02 Aug 2018 11:14 )  PTT:65.4 sec  CARDIAC MARKERS ( 31 Jul 2018 22:55 )  <0.015 ng/mL / x     / x     / x     / x        CAPILLARY BLOOD GLUCOSE  POCT Blood Glucose.: 155 mg/dL (02 Aug 2018 11:30)  POCT Blood Glucose.: 141 mg/dL (02 Aug 2018 08:23)  POCT Blood Glucose.: 147 mg/dL (01 Aug 2018 21:56)  POCT Blood Glucose.: 163 mg/dL (01 Aug 2018 17:26)  RECENT CULTURES:    RADIOLOGY & ADDITIONAL TESTS:  < from: CT Sinuses No Cont (08.02.18 @ 12:44) >    No evidence of inflammatory disease in the paranasal sinuses or their   respective drainage pathways.     < end of copied text >  < from: CT Head No Cont (08.02.18 @ 12:42) >  IMPRESSION:    No acute intracranial hemorrhage, mass effect, or midline shift. No   interval change since head CT of 8/25/15.    Mild chronic bilateral cerebral white matter microvascular changes.      < end of copied text >    < from: US Duplex Venous Lower Ext Complete, Bilateral (08.01.18 @ 13:47) >    Right lower extremity: There is thrombus in the right soleal and   posterior tibial veins as well as the distal popliteal vein. There is   normal compressibility of the  common femoral, femoral and popliteal   veins.      Left lower extremity: There is normal compressibility of the  common   femoral, femoral and popliteal veins. No calf vein thrombosis is detected.    There are bilateral popliteal cysts, measuring 4.0 x 1.0 x 2.0 cm on the   right and 5.0 x 2.1 cm on the left.    IMPRESSION:     Right lower extremity below the knee deep vein thrombosis.    < from: CT Abdomen and Pelvis w/ IV Cont (08.01.18 @ 01:06) >  IMPRESSION:    Right-sided segmental pulmonary emboli. Suggestion of bowing of the   interventricular septum to the left suggesting right heart strain.   Correlate with echo.    < end of copied text >  < from: Xray Chest 1 View AP/PA. (07.31.18 @ 23:32) >  The lungs are clear.  No pleural abnormality is seen.      Cardiomediastinal silhouette is intact.    < end of copied text >    < from: 12 Lead ECG (07.31.18 @ 22:25) >    Diagnosis Line Normal sinus rhythm  Right bundle branch block  Abnormal ECG  When compared with ECG of 22-JUN-2018 17:24,  T wave inversion no longer evident in Lateral leads    < end of copied text >  < from: Transthoracic Echocardiogram (08.01.18 @ 09:23) >  The left ventricle is normal in size, wall motion and contractility.   Mild concentric left ventricular hypertrophy is present.   Estimated left ventricular ejection fraction is 60-65 %.   The right ventricle is mildly dilated, with grossly preserved function   Mild aortic sclerosis is present with normal valvular opening.   Normal appearing mitral valve structure and function.   EA reversal of the mitral inflow consistent with reduced compliance of   the   left ventricle.   Mild (1+) mitral regurgitation is present.   Mild mitral annular calcification is present.    < end of copied text >    MEDICATIONS  (STANDING):  aspirin enteric coated 81 milliGRAM(s) Oral daily  cholecalciferol 2000 Unit(s) Oral daily  cyanocobalamin 1000 MICROGram(s) Oral daily  dextrose 5%. 1000 milliLiter(s) (50 mL/Hr) IV Continuous <Continuous>  dextrose 50% Injectable 12.5 Gram(s) IV Push once  dextrose 50% Injectable 25 Gram(s) IV Push once  dextrose 50% Injectable 25 Gram(s) IV Push once  fluticasone propionate 50 MICROgram(s)/spray Nasal Spray 1 Spray(s) Both Nostrils two times a day  heparin  Infusion.  Unit(s)/Hr (21 mL/Hr) IV Continuous <Continuous>  insulin glargine Injectable (LANTUS) 20 Unit(s) SubCutaneous at bedtime  insulin lispro (HumaLOG) corrective regimen sliding scale   SubCutaneous three times a day before meals  insulin lispro (HumaLOG) corrective regimen sliding scale   SubCutaneous at bedtime  levothyroxine 88 MICROGram(s) Oral daily  metoprolol tartrate 25 milliGRAM(s) Oral two times a day  multivitamin/minerals 1 Tablet(s) Oral daily  sodium chloride 0.9%. 1000 milliLiter(s) (50 mL/Hr) IV Continuous <Continuous>  warfarin 10 milliGRAM(s) Oral once    MEDICATIONS  (PRN):  acetaminophen   Tablet. 650 milliGRAM(s) Oral every 6 hours PRN Mild Pain (1 - 3)  acetaminophen 325 mG/butalbital 50 mG/caffeine 40 mG 1 Tablet(s) Oral every 6 hours PRN migraine  dextrose 40% Gel 15 Gram(s) Oral once PRN Blood Glucose LESS THAN 70 milliGRAM(s)/deciliter  glucagon  Injectable 1 milliGRAM(s) IntraMuscular once PRN Glucose LESS THAN 70 milligrams/deciliter  heparin  Injectable 9500 Unit(s) IV Push every 6 hours PRN For aPTT less than 40  heparin  Injectable 4500 Unit(s) IV Push every 6 hours PRN For aPTT between 40 - 57  ondansetron Injectable 4 milliGRAM(s) IV Push every 6 hours PRN Nausea and/or Vomiting  sodium chloride 0.65% Nasal 1 Spray(s) Both Nostrils every 2 hours PRN Nasal Congestion
Subjective:  Patient is a 78y old  Female who presents with a chief complaint of SOB, RUQ pain     HPI:      78 / F with PMHx HTN, DM2, hypothyroidism, chronic back pain, urolithiasis 9mm s/p stent then lithotripsy then  s/p stent s/p removal Dr. Alvarez, c/b laryngeal inflammation with altered voice admitted on 8/1/18 with c/o  sudden onset right lower chest pain/RUQ pain associated with inability to take a deep breath. It started suddenly at  night. Pain was severe and was not relieved with hydrocodone. Also, associated with SOB.  No fevers, chills, sweats, weight loss, fatigue, or malaise.  Pt has been mostly sedentary in the last one month or so as she had 3 surgeries back to back. Denies any leg pain.  CTA abdo/chest in ED showed PE with right heart strain.   Pt feeling better at this time with less sob but pleuritic chest pain on right side present. She is on 2 L of O2 via nc.  she was seen by ICU service also.     8/2/18 - Patient seen and examined at bedside earlier today, reports headaches and frontal headaches, + reports nasal congestion, tylenol not helpful , + dyspnea improving, denies leg pains, no signs of bleeding   8/3/18 - pt seen and examined, report nasal congestion, + frontal headaches, fiorecet effective, denies Cp, dyspnea better, no  abdominal pain, palpitations   8/4/18 - pt seen and examined, feels better, denies dyspnea, on O2 , reports low P2 sats overnight, denies CP   8/5/18 - pt seen and examined, denies CP, dyspnea , headaches at night, denies new sx , no signs of bleeding   8/6/18  - pt seen and examined, + HA in am, denies dyspnea, cough, tolerates IV heparin drip, POC discussed     Review of system- Rest of the review of system are negative except mentioned in HPI    T(C): 36.8 (08-06-18 @ 12:29), Max: 36.8 (08-06-18 @ 12:29)  T(F): 98.2 (08-06-18 @ 12:29), Max: 98.2 (08-06-18 @ 12:29)  HR: 55 (08-06-18 @ 07:00) (55 - 66)  BP: 115/44 (08-06-18 @ 05:23) (115/44 - 168/73)  RR: 14 (08-06-18 @ 07:00) (11 - 18)  SpO2: 92% (08-06-18 @ 07:00) (92% - 99%)  Wt(kg): --    PHYSICAL EXAM:  GENERAL: NAD  NERVOUS SYSTEM:  Alert & Oriented X3, non- focal exam  HEAD:  Atraumatic, Normocephalic, tenderness over frontal sinuses   EYES: EOMI, PERRLA, conjunctiva and sclera clear  HEENT: Moist mucous membranes  NECK: Supple, No JVD  CHEST/LUNG: Clear to auscultation bilaterally; No rales, no rhonchi, no wheezing, or rubs  HEART: Regular rate and rhythm; No murmurs, rubs, or gallops  ABDOMEN: Soft, Nontender, Nondistended; Bowel sounds present  GENITOURINARY- Voiding, no suprapubic tenderness  EXTREMITIES:  2+ Peripheral Pulses, No clubbing, cyanosis, or edema  MUSCULOSKELETAL:- No muscle tenderness, Muscle tone normal, No joint tenderness, no Joint swelling, Joint range of motion-normal  SKIN-no rash, no lesion    LABS:  08-06    144  |  108  |  16  ----------------------------<  120<H>  4.1   |  28  |  0.90    Ca    8.5      06 Aug 2018 06:01                       10.7   5.60  )-----------( 152      ( 06 Aug 2018 06:01 )             32.6     PT/INR - ( 06 Aug 2018 06:01 )   PT: 20.1 sec;   INR: 1.84 ratio    PTT - ( 06 Aug 2018 06:01 )  PTT:94.2 sec  PT/INR - ( 05 Aug 2018 05:25 )   PT: 17.2 sec;   INR: 1.58 ratio    PTT - ( 05 Aug 2018 05:25 )  PTT:75.8 sec  PT/INR - ( 04 Aug 2018 05:21 )   PT: 13.4 sec;   INR: 1.24 ratio    PTT - ( 04 Aug 2018 05:21 )  PTT:63.6 sec  PT/INR - ( 03 Aug 2018 05:17 )   PT: 12.7 sec;   INR: 1.17 ratio    PTT - ( 03 Aug 2018 05:17 )  PTT:82.3 sec                         10.4   6.52  )-----------( 169      ( 02 Aug 2018 04:34 )             32.0     08-02    138  |  106  |  16  ----------------------------<  105<H>  3.5   |  29  |  1.09    Ca    8.3<L>      02 Aug 2018 04:34    TPro  6.9  /  Alb  3.5  /  TBili  0.4  /  DBili  x   /  AST  13<L>  /  ALT  22  /  AlkPhos  98  07-31    PT/INR - ( 02 Aug 2018 04:34 )   PT: 12.0 sec;   INR: 1.11 ratio         PTT - ( 02 Aug 2018 11:14 )  PTT:65.4 sec  CARDIAC MARKERS ( 31 Jul 2018 22:55 )  <0.015 ng/mL / x     / x     / x     / x        CAPILLARY BLOOD GLUCOSE  CAPILLARY BLOOD GLUCOSE  Hemoglobin A1C, Whole Blood (06.23.18 @ 07:31)    Hemoglobin A1C, Whole Blood: 6.7: Method: Immunoassay       Reference Range                4.0-5.6%       High risk (prediabetic)        5.7-6.4%       Diabetic, diagnostic             >=6.5%       ADA diabetic treatment goal       <7.0%  The Hemoglobin A1c testing is NGSP-certified.Reference ranges are based  upon the 2010 recommendations of  the American Diabetes Association.  Interpretation may vary for children  and adolescents. %        POCT Blood Glucose.: 178 mg/dL (06 Aug 2018 12:29)  POCT Blood Glucose.: 122 mg/dL (06 Aug 2018 08:01)  POCT Blood Glucose.: 129 mg/dL (05 Aug 2018 21:21)  POCT Blood Glucose.: 153 mg/dL (05 Aug 2018 17:02)    RADIOLOGY & ADDITIONAL TESTS:    < from: CT Sinuses No Cont (08.02.18 @ 12:44) >    No evidence of inflammatory disease in the paranasal sinuses or their   respective drainage pathways.     < end of copied text >  < from: CT Head No Cont (08.02.18 @ 12:42) >  IMPRESSION:    No acute intracranial hemorrhage, mass effect, or midline shift. No   interval change since head CT of 8/25/15.    Mild chronic bilateral cerebral white matter microvascular changes.      < end of copied text >    < from: US Duplex Venous Lower Ext Complete, Bilateral (08.01.18 @ 13:47) >    Right lower extremity: There is thrombus in the right soleal and   posterior tibial veins as well as the distal popliteal vein. There is   normal compressibility of the  common femoral, femoral and popliteal   veins.      Left lower extremity: There is normal compressibility of the  common   femoral, femoral and popliteal veins. No calf vein thrombosis is detected.    There are bilateral popliteal cysts, measuring 4.0 x 1.0 x 2.0 cm on the   right and 5.0 x 2.1 cm on the left.    IMPRESSION:     Right lower extremity below the knee deep vein thrombosis.    < from: CT Abdomen and Pelvis w/ IV Cont (08.01.18 @ 01:06) >  IMPRESSION:    Right-sided segmental pulmonary emboli. Suggestion of bowing of the   interventricular septum to the left suggesting right heart strain.   Correlate with echo.    < end of copied text >  < from: Xray Chest 1 View AP/PA. (07.31.18 @ 23:32) >  The lungs are clear.  No pleural abnormality is seen.      Cardiomediastinal silhouette is intact.    < end of copied text >    < from: 12 Lead ECG (07.31.18 @ 22:25) >    Diagnosis Line Normal sinus rhythm  Right bundle branch block  Abnormal ECG  When compared with ECG of 22-JUN-2018 17:24,  T wave inversion no longer evident in Lateral leads    < end of copied text >  < from: Transthoracic Echocardiogram (08.01.18 @ 09:23) >  The left ventricle is normal in size, wall motion and contractility.   Mild concentric left ventricular hypertrophy is present.   Estimated left ventricular ejection fraction is 60-65 %.   The right ventricle is mildly dilated, with grossly preserved function   Mild aortic sclerosis is present with normal valvular opening.   Normal appearing mitral valve structure and function.   EA reversal of the mitral inflow consistent with reduced compliance of   the   left ventricle.   Mild (1+) mitral regurgitation is present.   Mild mitral annular calcification is present.    < end of copied text >    MEDICATIONS  (STANDING):  aspirin enteric coated 81 milliGRAM(s) Oral daily  cholecalciferol 2000 Unit(s) Oral daily  cyanocobalamin 1000 MICROGram(s) Oral daily  dextrose 5%. 1000 milliLiter(s) (50 mL/Hr) IV Continuous <Continuous>  dextrose 50% Injectable 12.5 Gram(s) IV Push once  dextrose 50% Injectable 25 Gram(s) IV Push once  dextrose 50% Injectable 25 Gram(s) IV Push once  fluticasone propionate 50 MICROgram(s)/spray Nasal Spray 1 Spray(s) Both Nostrils two times a day  heparin  Infusion.  Unit(s)/Hr (21 mL/Hr) IV Continuous <Continuous>  insulin glargine Injectable (LANTUS) 20 Unit(s) SubCutaneous at bedtime  insulin lispro (HumaLOG) corrective regimen sliding scale   SubCutaneous three times a day before meals  insulin lispro (HumaLOG) corrective regimen sliding scale   SubCutaneous at bedtime  levothyroxine 88 MICROGram(s) Oral daily  metoprolol tartrate 25 milliGRAM(s) Oral two times a day  multivitamin/minerals 1 Tablet(s) Oral daily  sodium chloride 0.9%. 1000 milliLiter(s) (50 mL/Hr) IV Continuous <Continuous>  warfarin 10 milliGRAM(s) Oral once    MEDICATIONS  (PRN):  acetaminophen   Tablet. 650 milliGRAM(s) Oral every 6 hours PRN Mild Pain (1 - 3)  acetaminophen 325 mG/butalbital 50 mG/caffeine 40 mG 1 Tablet(s) Oral every 6 hours PRN migraine  dextrose 40% Gel 15 Gram(s) Oral once PRN Blood Glucose LESS THAN 70 milliGRAM(s)/deciliter  glucagon  Injectable 1 milliGRAM(s) IntraMuscular once PRN Glucose LESS THAN 70 milligrams/deciliter  heparin  Injectable 9500 Unit(s) IV Push every 6 hours PRN For aPTT less than 40  heparin  Injectable 4500 Unit(s) IV Push every 6 hours PRN For aPTT between 40 - 57  ondansetron Injectable 4 milliGRAM(s) IV Push every 6 hours PRN Nausea and/or Vomiting  sodium chloride 0.65% Nasal 1 Spray(s) Both Nostrils every 2 hours PRN Nasal Congestion
Subjective:  Patient is a 78y old  Female who presents with a chief complaint of SOB, RUQ pain     HPI:      78 / F with PMHx HTN, DM2, hypothyroidism, chronic back pain, urolithiasis 9mm s/p stent then lithotripsy then  s/p stent s/p removal Dr. Alvarez, c/b laryngeal inflammation with altered voice admitted on 8/1/18 with c/o  sudden onset right lower chest pain/RUQ pain associated with inability to take a deep breath. It started suddenly at  night. Pain was severe and was not relieved with hydrocodone. Also, associated with SOB.  No fevers, chills, sweats, weight loss, fatigue, or malaise.  Pt has been mostly sedentary in the last one month or so as she had 3 surgeries back to back. Denies any leg pain.  CTA abdo/chest in ED showed PE with right heart strain.   Pt feeling better at this time with less sob but pleuritic chest pain on right side present. She is on 2 L of O2 via nc.  she was seen by ICU service also.     8/2/18 - Patient seen and examined at bedside earlier today, reports headaches and frontal headaches, + reports nasal congestion, tylenol not helpful , + dyspnea improving, denies leg pains, no signs of bleeding   8/3/18 - pt seen and examined, report nasal congestion, + frontal headaches, fiorecet effective, denies Cp, dyspnea better, no  abdominal pain, palpitations   8/4/18 - pt seen and examined, feels better, denies dyspnea, on O2 , reports low P2 sats overnight, denies CP   8/5/18 - pt seen and examined, denies CP, dyspnea , headaches at night, denies new sx , no signs of bleeding   8/6/18  - pt seen and examined, + HA in am, denies dyspnea, cough, tolerates IV heparin drip, POC discussed   8/7/18 - pt seen and examined, HA in am, denies dyspnea, chest pain , abd pain     Review of system- Rest of the review of system are negative except mentioned in HPI    T(C): 36.3 (08-07-18 @ 10:29), Max: 37.1 (08-06-18 @ 23:57)  T(F): 97.3 (08-07-18 @ 10:29), Max: 98.7 (08-06-18 @ 23:57)  HR: 77 (08-06-18 @ 23:57) (77 - 77)  BP: 146/61 (08-07-18 @ 06:30) (131/59 - 175/61)  RR: 18 (08-06-18 @ 23:57) (18 - 18)  SpO2: 100% (08-07-18 @ 06:30) (97% - 100%)  Wt(kg): --    PHYSICAL EXAM:  GENERAL: NAD  NERVOUS SYSTEM:  Alert & Oriented X3, non- focal exam  HEAD:  Atraumatic, Normocephalic, tenderness over frontal sinuses   EYES: EOMI, PERRLA, conjunctiva and sclera clear  HEENT: Moist mucous membranes  NECK: Supple, No JVD  CHEST/LUNG: Clear to auscultation bilaterally; No rales, no rhonchi, no wheezing, or rubs  HEART: Regular rate and rhythm; No murmurs, rubs, or gallops  ABDOMEN: Soft, Nontender, Nondistended; Bowel sounds present  GENITOURINARY- Voiding, no suprapubic tenderness  EXTREMITIES:  2+ Peripheral Pulses, No clubbing, cyanosis, or edema  MUSCULOSKELETAL:- No muscle tenderness, Muscle tone normal, No joint tenderness, no Joint swelling, Joint range of motion-normal  SKIN-no rash, no lesion    LABS:  08-07    144  |  107  |  19  ----------------------------<  126<H>  4.0   |  30  |  1.03    Ca    8.7      07 Aug 2018 05:03                         10.3   5.41  )-----------( 161      ( 07 Aug 2018 05:03 )             31.0     PT/INR - ( 07 Aug 2018 05:03 )   PT: 21.4 sec;   INR: 1.95 ratio         PTT - ( 07 Aug 2018 05:03 )  PTT:97.3 sec      08-06    144  |  108  |  16  ----------------------------<  120<H>  4.1   |  28  |  0.90    Ca    8.5      06 Aug 2018 06:01                       10.7   5.60  )-----------( 152      ( 06 Aug 2018 06:01 )             32.6     PT/INR - ( 06 Aug 2018 06:01 )   PT: 20.1 sec;   INR: 1.84 ratio    PTT - ( 06 Aug 2018 06:01 )  PTT:94.2 sec  PT/INR - ( 05 Aug 2018 05:25 )   PT: 17.2 sec;   INR: 1.58 ratio    PTT - ( 05 Aug 2018 05:25 )  PTT:75.8 sec  PT/INR - ( 04 Aug 2018 05:21 )   PT: 13.4 sec;   INR: 1.24 ratio    PTT - ( 04 Aug 2018 05:21 )  PTT:63.6 sec  PT/INR - ( 03 Aug 2018 05:17 )   PT: 12.7 sec;   INR: 1.17 ratio    PTT - ( 03 Aug 2018 05:17 )  PTT:82.3 sec                         10.4   6.52  )-----------( 169      ( 02 Aug 2018 04:34 )             32.0     08-02    138  |  106  |  16  ----------------------------<  105<H>  3.5   |  29  |  1.09    Ca    8.3<L>      02 Aug 2018 04:34    TPro  6.9  /  Alb  3.5  /  TBili  0.4  /  DBili  x   /  AST  13<L>  /  ALT  22  /  AlkPhos  98  07-31    PT/INR - ( 02 Aug 2018 04:34 )   PT: 12.0 sec;   INR: 1.11 ratio         PTT - ( 02 Aug 2018 11:14 )  PTT:65.4 sec  CARDIAC MARKERS ( 31 Jul 2018 22:55 )  <0.015 ng/mL / x     / x     / x     / x        CAPILLARY BLOOD GLUCOSE  CAPILLARY BLOOD GLUCOSE  Hemoglobin A1C, Whole Blood (06.23.18 @ 07:31)    Hemoglobin A1C, Whole Blood: 6.7: Method: Immunoassay       Reference Range                4.0-5.6%       High risk (prediabetic)        5.7-6.4%       Diabetic, diagnostic             >=6.5%       ADA diabetic treatment goal       <7.0%  The Hemoglobin A1c testing is NGSP-certified.Reference ranges are based  upon the 2010 recommendations of  the American Diabetes Association.  Interpretation may vary for children  and adolescents. %        POCT Blood Glucose.: 178 mg/dL (06 Aug 2018 12:29)  POCT Blood Glucose.: 122 mg/dL (06 Aug 2018 08:01)  POCT Blood Glucose.: 129 mg/dL (05 Aug 2018 21:21)  POCT Blood Glucose.: 153 mg/dL (05 Aug 2018 17:02)    RADIOLOGY & ADDITIONAL TESTS:    < from: CT Sinuses No Cont (08.02.18 @ 12:44) >    No evidence of inflammatory disease in the paranasal sinuses or their   respective drainage pathways.     < end of copied text >  < from: CT Head No Cont (08.02.18 @ 12:42) >  IMPRESSION:    No acute intracranial hemorrhage, mass effect, or midline shift. No   interval change since head CT of 8/25/15.    Mild chronic bilateral cerebral white matter microvascular changes.      < end of copied text >    < from: US Duplex Venous Lower Ext Complete, Bilateral (08.01.18 @ 13:47) >    Right lower extremity: There is thrombus in the right soleal and   posterior tibial veins as well as the distal popliteal vein. There is   normal compressibility of the  common femoral, femoral and popliteal   veins.      Left lower extremity: There is normal compressibility of the  common   femoral, femoral and popliteal veins. No calf vein thrombosis is detected.    There are bilateral popliteal cysts, measuring 4.0 x 1.0 x 2.0 cm on the   right and 5.0 x 2.1 cm on the left.    IMPRESSION:     Right lower extremity below the knee deep vein thrombosis.    < from: CT Abdomen and Pelvis w/ IV Cont (08.01.18 @ 01:06) >  IMPRESSION:    Right-sided segmental pulmonary emboli. Suggestion of bowing of the   interventricular septum to the left suggesting right heart strain.   Correlate with echo.    < end of copied text >  < from: Xray Chest 1 View AP/PA. (07.31.18 @ 23:32) >  The lungs are clear.  No pleural abnormality is seen.      Cardiomediastinal silhouette is intact.    < end of copied text >    < from: 12 Lead ECG (07.31.18 @ 22:25) >    Diagnosis Line Normal sinus rhythm  Right bundle branch block  Abnormal ECG  When compared with ECG of 22-JUN-2018 17:24,  T wave inversion no longer evident in Lateral leads    < end of copied text >  < from: Transthoracic Echocardiogram (08.01.18 @ 09:23) >  The left ventricle is normal in size, wall motion and contractility.   Mild concentric left ventricular hypertrophy is present.   Estimated left ventricular ejection fraction is 60-65 %.   The right ventricle is mildly dilated, with grossly preserved function   Mild aortic sclerosis is present with normal valvular opening.   Normal appearing mitral valve structure and function.   EA reversal of the mitral inflow consistent with reduced compliance of   the   left ventricle.   Mild (1+) mitral regurgitation is present.   Mild mitral annular calcification is present.    < end of copied text >    MEDICATIONS  (STANDING):  aspirin enteric coated 81 milliGRAM(s) Oral daily  cholecalciferol 2000 Unit(s) Oral daily  cyanocobalamin 1000 MICROGram(s) Oral daily  dextrose 5%. 1000 milliLiter(s) (50 mL/Hr) IV Continuous <Continuous>  dextrose 50% Injectable 12.5 Gram(s) IV Push once  dextrose 50% Injectable 25 Gram(s) IV Push once  dextrose 50% Injectable 25 Gram(s) IV Push once  fluticasone propionate 50 MICROgram(s)/spray Nasal Spray 1 Spray(s) Both Nostrils two times a day  heparin  Infusion.  Unit(s)/Hr (21 mL/Hr) IV Continuous <Continuous>  insulin glargine Injectable (LANTUS) 20 Unit(s) SubCutaneous at bedtime  insulin lispro (HumaLOG) corrective regimen sliding scale   SubCutaneous three times a day before meals  insulin lispro (HumaLOG) corrective regimen sliding scale   SubCutaneous at bedtime  levothyroxine 88 MICROGram(s) Oral daily  metoprolol tartrate 25 milliGRAM(s) Oral two times a day  multivitamin/minerals 1 Tablet(s) Oral daily  sodium chloride 0.9%. 1000 milliLiter(s) (50 mL/Hr) IV Continuous <Continuous>  warfarin 10 milliGRAM(s) Oral once    MEDICATIONS  (PRN):  acetaminophen   Tablet. 650 milliGRAM(s) Oral every 6 hours PRN Mild Pain (1 - 3)  acetaminophen 325 mG/butalbital 50 mG/caffeine 40 mG 1 Tablet(s) Oral every 6 hours PRN migraine  dextrose 40% Gel 15 Gram(s) Oral once PRN Blood Glucose LESS THAN 70 milliGRAM(s)/deciliter  glucagon  Injectable 1 milliGRAM(s) IntraMuscular once PRN Glucose LESS THAN 70 milligrams/deciliter  heparin  Injectable 9500 Unit(s) IV Push every 6 hours PRN For aPTT less than 40  heparin  Injectable 4500 Unit(s) IV Push every 6 hours PRN For aPTT between 40 - 57  ondansetron Injectable 4 milliGRAM(s) IV Push every 6 hours PRN Nausea and/or Vomiting  sodium chloride 0.65% Nasal 1 Spray(s) Both Nostrils every 2 hours PRN Nasal Congestion
HPI:  78 / F with PMHx HTN, DM2, hypothyroidism, chronic back pain, urolithiasis 9mm s/p stent then lithotripsy then  s/p stent s/p removal Dr. Alvarez, c/b laryngeal inflammation with altered voice,  came to ED for c/o  sudden onset right lower chest pain/RUQ pain associated with inability to take a deep breath. It started last night. Pain was severe and was not relieved with hydrocodone. Also, associated with SOB.  No fevers, chills, sweats, weight loss, fatigue, or malaise.  Pt has been mostly sedentary in the last one month or so as she had 3 surgeries back to back.  Denies any leg pain.  CTA abdo/chest in ED showed PE with right heart strain.   Pt feeling better at this time with less sob but pleuritic chest pain on right side present. She is on 2 L of O2 via nc.  she was seen by ICU service also. (01 Aug 2018 08:43)      Patient is a 78y old  Female who presents with a chief complaint of SOB, RUQ pain (01 Aug 2018 10:36)  pt found to have on 8-1-18 Right-sided segmental pulmonary emboli and Right lower extremity below the knee deep vein thrombosis.    Consulted by Dr. Marina   for VTE prophylaxis, risk stratification, and anticoagulation management.    PAST MEDICAL & SURGICAL HISTORY:  Hypovitaminosis D  Renal cyst  Hypercholesterolemia  Spinal stenosis  Obesity  Poliomyelitis  Arytenoid finding: left arytenoid and vocal fold hematoma secondary to difficult intubation  Chronic back pain  Hypothyroidism  DM (diabetes mellitus)  Hypertension  History of tonsillectomy  Bartholin cyst  Ectopic pregnancy  Abscess of breast: drainage of abscess left breast  S/P shoulder replacement, left: partial  Encounter for removal of ureteral stent  S/P cholecystectomy  CrCl: 63  IMPROVE VTE Risk Score: #5    IMPROVE Bleeding Risk Score #1.5    Falls Risk:   High (x  )  Mod (  )  Low (  )  8-3-18 Pt seen at bedside on cardiac SD, OOB in chair.  Discussed her anticoagulation with UFH overlapping with coumadin.  Questions answered. Literature provided.   Informed her that her INR is 1.17 today and I will increase her dose to  10MG  tonight.   8/4/18: Patient seen at bedside OOB to chair napping. Discussed current INR 1.24 from 1.17 yesterday. O2Sat 100% on 2lnc at this time- patient comfortable.  8/5/18: Patient seen at bedside- resting. INR 1.58 from 1.24 yesterday. Will continue 10mg again today. Probably needs 7.5mg daily to maintain therapeutic INR but will await tomorrow's INR.  8/6: seen at bedside, no concerns, plans on f/u at Dr. Aguilar's office for INR's.    FAMILY HISTORY:  Family history of hypertension (Father, Mother)  Family history of diabetes mellitus (Sibling, Grandparent): Brother and grandmother    Denies any personal or familial history of clotting or bleeding disorders.    Allergies    Ammoniated Mercury (Rash)  Keflex (Pruritus)  penicillin G potassium (Pruritus; Rash)  Victoza (Other)    Intolerances        REVIEW OF SYSTEMS    (  )Fever	     (  )Constipation	(  )SOB				(  )Headache	(  )Dysuria  (  )Chills	     (  )Melena	(  )Dyspnea present on exertion	                    (  )Dizziness                    (  )Polyuria  (  )Nausea	     (  )Hematochezia	(  )Cough			                    (  )Syncope   	(  )Hematuria  (  )Vomiting    (  )Chest Pain	(  )Wheezing			(  )Weakness  (  )Diarrhea     (  )Palpitations	(  )Anorexia			(  )Myalgia    All other review of systems negative: Yes      PHYSICAL EXAM:    Constitutional: Appears Well    Neurological: A& O x 3    Skin: Warm    Respiratory and Thorax: normal effort; Breath sounds: normal; diminished  	  Cardiovascular: S1, S2, regular, NMBR	    Gastrointestinal: BS + x 4Q, nontender	    Genitourinary:  Bladder nondistended, nontender    Musculoskeletal:   General Right:   no muscle/joint tenderness,   normal tone, no joint swelling,   ROM:full	    General Left:   no muscle/joint tenderness,   normal tone, no joint swelling,   ROM: full    Lower extrems:   Right: no calf tenderness              negative kathe's sign               + pedal pulses    Left:   no calf tenderness              negative kathe's sign               + pedal pulses, + edema noted 1-2+                            10.7   5.60  )-----------( 152      ( 06 Aug 2018 06:01 )             32.6       08-06    144  |  108  |  16  ----------------------------<  120<H>  4.1   |  28  |  0.90    Ca    8.5      06 Aug 2018 06:01         PTT - ( 06 Aug 2018 06:01 )  PTT:94.2 sec                            10.6   5.63  )-----------( 161      ( 05 Aug 2018 05:25 )             32.7       08-05    145  |  108  |  19  ----------------------------<  121<H>  4.5   |  31  |  0.97    Ca    8.4<L>      05 Aug 2018 05:25       PTT - ( 05 Aug 2018 05:25 )  PTT:75.8 sec                          10.6   5.94  )-----------( 163      ( 04 Aug 2018 05:21 )             31.6       08-04    141  |  105  |  28<H>  ----------------------------<  117<H>  3.9   |  29  |  1.16    Ca    8.6      04 Aug 2018 05:21          PT/INR - ( 06 Aug 2018 06:01 )   PT: 20.1 sec;   INR: 1.84 ratio    PT/INR - ( 05 Aug 2018 05:25 )   PT: 17.2 sec;   INR: 1.58 ratio    PT/INR - ( 04 Aug 2018 05:21 )   PT: 13.4 sec;   INR: 1.24 ratio           PT/INR - ( 05 Aug 2018 05:25 )   PT: 17.2 sec;   INR: 1.58 ratio   PT/INR - ( 04 Aug 2018 05:21 )   PT: 13.4 sec;   INR: 1.24 ratio    PT/INR - ( 03 Aug 2018 05:17 )   PT: 12.7 sec;   INR: 1.17 ratio  PT/INR - ( 02 Aug 2018 04:34 )   PT: 12.0 sec;   INR: 1.11 ratio           CT head 8-1-18  IMPRESSION:    No acute intracranial hemorrhage, mass effect, or midline shift. No   interval change since head CT of 8/25/15.    Mild chronic bilateral cerebral white matter microvascular changes.        CHEST: ct 8-1-18    LUNGS, LARGE AIRWAYS, PLEURA: Patent central airways. No consolidation or   pneumothorax.  No pleural effusion. Bibasilar linear atelectasis  VESSELS: Atherosclerosis. There are filling defects along the right   segmental pulmonary arteries consistent with pulmonary emboli. There may   be an additional filling defect along the left lower lobe basal segment.   No saddle embolus. The main pulmonary artery measures 3.1 cm. Suggestion   of slight bowing of the interventricular septum to the left  HEART: Heart size is normal. No pericardial effusion. Coronary   atherosclerosis  MEDIASTINUM AND SHANNAN: No lymphadenopathy.  CHEST WALL AND LOWER NECK: Within normal limits.    ABDOMEN AND PELVIS: 8-1-18    LIVER: Within normal limits.  GALLBLADDER: Cholecystectomy  SPLEEN: Within normal limits.  PANCREAS: Within normal limits.  ADRENALS: Within normal limits.  KIDNEYS/URETERS: Right renal cyst again noted. The previously seen   calculus within the left renal pelvis has passed and there is no longer   left-sided hydronephrosis or perinephric stranding. Left-sided lesions   again noted, too small to characterize. Presumed left parapelvic cysts.   The left kidney is atrophic.    BLADDER: Within normal limits.  REPRODUCTIVE ORGANS: Unremarkable    BOWEL: Limited without oral contrast. No evidence of bowel obstruction.   Normal appendix. Extensive diverticula without evidence of diverticulitis.  PERITONEUM: No ascites.  VESSELS:  Atherosclerosis  RETROPERITONEUM: No lymphadenopathy.    ABDOMINAL WALL: Tiny fat-containing umbilical hernia  BONES: Degenerative changes    IMPRESSION:    Right-sided segmental pulmonary emboli. Suggestion of bowing of the   interventricular septum to the left suggesting right heart strain.   Correlate with echo.    	    Doppler: 8-1-18  FINDINGS:    Right lower extremity: There is thrombus in the right soleal and   posterior tibial veins as well as the distal popliteal vein. There is   normal compressibility of the  common femoral, femoral and popliteal   veins.      Left lower extremity: There is normal compressibility of the  common   femoral, femoral and popliteal veins. No calf vein thrombosis is detected.    There are bilateral popliteal cysts, measuring 4.0 x 1.0 x 2.0 cm on the   right and 5.0 x 2.1 cm on the left.    IMPRESSION:     Right lower extremity below the knee deep vein thrombosis.      MEDICATIONS  (STANDING):  aspirin enteric coated 81 milliGRAM(s) Oral daily  cholecalciferol 2000 Unit(s) Oral daily  cyanocobalamin 1000 MICROGram(s) Oral daily  dextrose 5%. 1000 milliLiter(s) IV Continuous <Continuous>  dextrose 50% Injectable 12.5 Gram(s) IV Push once  dextrose 50% Injectable 25 Gram(s) IV Push once  dextrose 50% Injectable 25 Gram(s) IV Push once  heparin  Infusion.  Unit(s)/Hr IV Continuous <Continuous>  insulin glargine Injectable (LANTUS) 20 Unit(s) SubCutaneous at bedtime  insulin lispro (HumaLOG) corrective regimen sliding scale   SubCutaneous three times a day before meals  insulin lispro (HumaLOG) corrective regimen sliding scale   SubCutaneous at bedtime  levothyroxine 88 MICROGram(s) Oral daily  losartan 100 milliGRAM(s) Oral daily  metoprolol tartrate 25 milliGRAM(s) Oral two times a day  multivitamin/minerals 1 Tablet(s) Oral daily  warfarin 10 milliGRAM(s) Oral once        DVT Prophylaxis:  LMWH                   (  )  Heparin SQ           (  )  Coumadin             (  x)  Xarelto                  (  )  Eliquis                   (  )  Venodynes           (x  )  Ambulation          (  )  UFH                       ( x )  Contraindicated  (  )

## 2018-08-08 NOTE — DISCHARGE NOTE ADULT - CARE PLAN
Principal Discharge DX:	Pulmonary emboli  Goal:	prevent recurrence  Assessment and plan of treatment:	take coumadin 7.5 mg, check INR in 2 days at Dr. Aguilar office, adjust dose as needed, follow up with PCP within 1 week, advised hypercoagulability work-up as outpatient to rule out underlying  disorders  follow up with pulmonology within 1 week  Secondary Diagnosis:	Hypertension  Assessment and plan of treatment:	lower dose of losartan, and toprol , check BP with PCP within 1 week  Secondary Diagnosis:	Obesity  Assessment and plan of treatment:	consider sleep studies as outpatient to exclude Obstructive sleep apnea  Secondary Diagnosis:	DVT (deep venous thrombosis)  Assessment and plan of treatment:	continue coumadin , keep INR between 2-3 , follow up with PCP within 2-3 days

## 2018-08-08 NOTE — PROGRESS NOTE ADULT - ASSESSMENT
- pulmonary embolism with segmental emboli in the right lower lobe branches.  - dvt of the right leg   - obesity   - suggestion of sleep apnea   - likely risk factor is recent immobility with  urological procedures .    PLAN     - continue with the heparin and coumadin and INR is still sub therapeutic and patient might want to go  home with the lovenox until inr is therapautic   - monitor hb closely .  - 02 supplementation for the hypoxia at home with the ambulation if she qualifies for the home therapy   - patient would benefit from the out patient  sleep studies  .   - symptomatic relief for the head ache and work up as per the hospitalist .  - patient was recommended to follow up with me as an out patient

## 2018-08-08 NOTE — DISCHARGE NOTE ADULT - OTHER SIGNIFICANT FINDINGS
< from: CT Sinuses No Cont (08.02.18 @ 12:44) >    No evidence of inflammatory disease in the paranasal sinuses or their   respective drainage pathways.     < end of copied text >  < from: CT Head No Cont (08.02.18 @ 12:42) >  IMPRESSION:    No acute intracranial hemorrhage, mass effect, or midline shift. No   interval change since head CT of 8/25/15.    Mild chronic bilateral cerebral white matter microvascular changes.    < end of copied text >  < from: US Duplex Venous Lower Ext Complete, Bilateral (08.01.18 @ 13:47) >  MPRESSION:     Right lower extremity below the knee deep vein thrombosis.    Findings discussed with Dr. Mcdermott on August 01, 2018, 3:45 PM.    < end of copied text >  Complete Blood Count Repeat Every 24 Hours X 3 Days (08.08.18 @ 04:39)    Nucleated RBC: 0 /100 WBCs    WBC Count: 6.43 K/uL    RBC Count: 3.84 M/uL    Hemoglobin: 11.5 g/dL    Hematocrit: 34.4 %    Mean Cell Volume: 89.6 fl    Mean Cell Hemoglobin: 29.9 pg    Mean Cell Hemoglobin Conc: 33.4 gm/dL    Red Cell Distrib Width: 13.3 %    Platelet Count - Automated: 172 K/uL    Basic Metabolic Panel in AM (08.07.18 @ 05:03)    Sodium, Serum: 144 mmol/L    Potassium, Serum: 4.0 mmol/L    Chloride, Serum: 107 mmol/L    Carbon Dioxide, Serum: 30 mmol/L    Anion Gap, Serum: 7 mmol/L    Blood Urea Nitrogen, Serum: 19 mg/dL    Creatinine, Serum: 1.03 mg/dL    Glucose, Serum: 126 mg/dL    Calcium, Total Serum: 8.7 mg/dL   Troponin I, Serum (08.03.18 @ 05:17)    Troponin I, Serum: <0.015: High Sensitivity Troponin and new reference  range effective 7/6/2016 ng/mL    Serum Pro-Brain Natriuretic Peptide (07.31.18 @ 22:55)    Serum Pro-Brain Natriuretic Peptide: 245 pg/mL    < from: Transthoracic Echocardiogram (08.01.18 @ 09:23) >   The left ventricle is normal in size, wall motion and contractility.   Mild concentric left ventricular hypertrophy is present.   Estimated left ventricular ejection fraction is 60-65 %.   The right ventricle is mildly dilated, with grossly preserved function   Mild aortic sclerosis is present with normal valvular opening.   Normal appearing mitral valve structure and function.   EA reversal of the mitral inflow consistent with reduced compliance of   the   left ventricle.   Mild (1+) mitral regurgitation is present.   Mild mitral annular calcification is present.    < end of copied text >  < from: CT Abdomen and Pelvis w/ IV Cont (08.01.18 @ 01:06) >  IMPRESSION:    Right-sided segmental pulmonary emboli. Suggestion of bowing of the   interventricular septum to the left suggesting right heart strain.   Correlate with echo.      < end of copied text >

## 2018-08-08 NOTE — PROGRESS NOTE ADULT - ASSESSMENT
A: 57 y/o female with + Right lung PE with + heart strain and RLE DVT. She continues to have subtherapeutic INRs, and now day #8/5 of UFH and warfarin overlap bridging. She received warfarin 10 mg yesterday evening, INR today 1.97. Discussed options of transitioning to lovenox and warfarin, pt states she is comfortable with the self injections.     Plan:   - if pt being discharged, then discharge with maintenance dose of warfarin 7.5 mg PO daily. And would add treatment dose of lovenox 120 mg SQ q12h qty #10 syringes until her INR is therapeutic  - maintain INR goal 2-3  - c/w warfarin 7.5 mg tonight  - c/w home med ASA 81 mg  - pt is to follow up within 1 week with her cardiologist Dr. Aguilar ([facility]) to recheck her INR  - encourage mobilization and maintain venodynes of LLE  - daily CBC/BMP, PT/INR    Will continue to follow

## 2018-08-08 NOTE — DISCHARGE NOTE ADULT - HOSPITAL COURSE
ubjective:  Patient is a 78y old  Female who presents with a chief complaint of SOB, RUQ pain   HPI:      78 / F with PMHx HTN, DM2, hypothyroidism, chronic back pain, urolithiasis 9mm s/p stent then lithotripsy then  s/p stent s/p removal Dr. Alvarez, c/b laryngeal inflammation with altered voice admitted on 8/1/18 with c/o  sudden onset right lower chest pain/RUQ pain associated with inability to take a deep breath. It started suddenly at  night. Pain was severe and was not relieved with hydrocodone. Also, associated with SOB.  No fevers, chills, sweats, weight loss, fatigue, or malaise.  Pt has been mostly sedentary in the last one month or so as she had 3 surgeries back to back. Denies any leg pain.  CTA abdo/chest in ED showed PE with right heart strain.   Pt feeling better at this time with less sob but pleuritic chest pain on right side present. She is on 2 L of O2 via nc.  she was seen by ICU service also.     8/2/18 - Patient seen and examined at bedside earlier today, reports headaches and frontal headaches, + reports nasal congestion, tylenol not helpful , + dyspnea improving, denies leg pains, no signs of bleeding   8/3/18 - pt seen and examined, report nasal congestion, + frontal headaches, fiorecet effective, denies Cp, dyspnea better, no  abdominal pain, palpitations   8/4/18 - pt seen and examined, feels better, denies dyspnea, on O2 , reports low P2 sats overnight, denies CP   8/5/18 - pt seen and examined, denies CP, dyspnea , headaches at night, denies new sx , no signs of bleeding   8/6/18  - pt seen and examined, + HA in am, denies dyspnea, cough, tolerates IV heparin drip, POC discussed   8/7/18 - pt seen and examined, HA in am, denies dyspnea, chest pain , abd pain   8/8/18 - pt seen and examined, denies dyspnea, no new sx, eager to go home  Review of system- Rest of the review of system are negative except mentioned in HPI  T(C): 36.8 (08-08-18 @ 11:59), Max: 36.8 (08-08-18 @ 11:59)  T(F): 98.3 (08-08-18 @ 11:59), Max: 98.3 (08-08-18 @ 11:59)  HR: 70 (08-07-18 @ 21:33) (70 - 70)  BP: 112/69 (08-08-18 @ 09:00) (112/69 - 148/51)  RR: 18 (08-07-18 @ 21:33) (18 - 18)  SpO2: 96% (08-08-18 @ 08:00) (96% - 96%)  Wt(kg): --  * Right sided Segmental Pulmonary Embolism  with acute hypoxic respiratory failure, improving   *  Provoked PE from being sedentary in the last 5 weeks.   * Right sided DVT   - CICU ,  cont supportive O2  - cont heparin drip with coumadin , AC service consult   - pt and her daughter want to go with coumadin; so give 10 mg tonight and then 5 mg daily, titrate dose with response to INR. Daily INR  - Echo - EF 60% , LVH , mild AS, diastolic dysfunction, dilated RV with preserves function   - cardio/pulmo consults  - outpatient hypercoagulability work-up and TAMICA work-up   - Leg venous dopplers - positive Right DVT   - INR daily  1.5 today --> 1.95, repeat  2.09, stop heparin at 1500, lovenox injection full dose at 5 pm, d/c home after that   - AC consult   - outpatient thrombophilia work-up  and TAMICA evaluation  - check O2 on ambulation - drops to 85% on ambulation  - repeat check O2 sats 8/8/18 - wnl on ambulation, no need for O2   * STEPHEN , resolved  - start IV fluids  - reduce dose of losartan 50--> 25   - repeat renal function in am - better   * Headaches r/o acute pathology, suspected sinusitis vs migraine  - CT head   - neg   - Ct sinuses - neg   - tyleno, fiorecet prn  - add flonase, nasal saline spray   *  HTN: overcontrolled   - noted low HR in 50th   - cont losartan 50--> 25   - lower dose of  metoprolol 25 bid--> toprol xl 25   -  hold HCTZ  *  Hypothyroidism: cont synthroid    *  DM 2:   - Hold oral hypoglycemics  - lantus + ISS  - Hemoglobin A1C, 6.7   *  Altered voice from intubation during ureteral stenting:  Speech therapist consult  * Mild anemia, resolved, dilutional   - check iron studies, b12, folate - wnl   Disposition - medically optimized to be discharged home with close follow up with PCP within 2-3 days to check INR , pulmonology within 1 week  continue coumadin   return to ED if fever, abdominal pain, nausea, vomiting, chest pain, dyspnea  Discharge plan discussed with patient, RN  Patient advised to follow up with PCP within 3-7 days  time spend 40 min  Discharge note faxed to PCP with my contact information to call me back   PCP notified via phone call  spoke with Dr. Zhang covering for Dr. Aguilar

## 2018-08-08 NOTE — PROGRESS NOTE ADULT - PROVIDER SPECIALTY LIST ADULT
Anticoag Management
Cardiology
Hospitalist
Pulmonology
Anticoag Management

## 2018-08-08 NOTE — DISCHARGE NOTE ADULT - CARE PROVIDER_API CALL
Vesna Aguilar), Internal Medicine  [address] [facility]  [address] [place] [address]  Phone: [phone]  Fax: [phone]    Mireya Coronado), Critical Care Medicine; Internal Medicine; Pulmonary Disease; Sleep Medicine  [address] [place] [address]  Phone: [phone]  Fax: [phone]

## 2018-08-09 RX ORDER — ENOXAPARIN SODIUM 100 MG/ML
120 INJECTION SUBCUTANEOUS
Qty: 4 | Refills: 1 | OUTPATIENT
Start: 2018-08-09 | End: 2018-08-12

## 2018-08-09 RX ORDER — WARFARIN SODIUM 2.5 MG/1
1 TABLET ORAL
Qty: 30 | Refills: 0 | OUTPATIENT
Start: 2018-08-09 | End: 2018-09-07

## 2018-08-14 DIAGNOSIS — Z87.442 PERSONAL HISTORY OF URINARY CALCULI: ICD-10-CM

## 2018-08-14 DIAGNOSIS — E11.9 TYPE 2 DIABETES MELLITUS WITHOUT COMPLICATIONS: ICD-10-CM

## 2018-08-14 DIAGNOSIS — I82.431 ACUTE EMBOLISM AND THROMBOSIS OF RIGHT POPLITEAL VEIN: ICD-10-CM

## 2018-08-14 DIAGNOSIS — E66.9 OBESITY, UNSPECIFIED: ICD-10-CM

## 2018-08-14 DIAGNOSIS — E55.9 VITAMIN D DEFICIENCY, UNSPECIFIED: ICD-10-CM

## 2018-08-14 DIAGNOSIS — I25.10 ATHEROSCLEROTIC HEART DISEASE OF NATIVE CORONARY ARTERY WITHOUT ANGINA PECTORIS: ICD-10-CM

## 2018-08-14 DIAGNOSIS — N28.1 CYST OF KIDNEY, ACQUIRED: ICD-10-CM

## 2018-08-14 DIAGNOSIS — I82.441 ACUTE EMBOLISM AND THROMBOSIS OF RIGHT TIBIAL VEIN: ICD-10-CM

## 2018-08-14 DIAGNOSIS — D64.9 ANEMIA, UNSPECIFIED: ICD-10-CM

## 2018-08-14 DIAGNOSIS — Z79.82 LONG TERM (CURRENT) USE OF ASPIRIN: ICD-10-CM

## 2018-08-14 DIAGNOSIS — Z90.49 ACQUIRED ABSENCE OF OTHER SPECIFIED PARTS OF DIGESTIVE TRACT: ICD-10-CM

## 2018-08-14 DIAGNOSIS — I26.99 OTHER PULMONARY EMBOLISM WITHOUT ACUTE COR PULMONALE: ICD-10-CM

## 2018-08-14 DIAGNOSIS — I08.3 COMBINED RHEUMATIC DISORDERS OF MITRAL, AORTIC AND TRICUSPID VALVES: ICD-10-CM

## 2018-08-14 DIAGNOSIS — Z96.612 PRESENCE OF LEFT ARTIFICIAL SHOULDER JOINT: ICD-10-CM

## 2018-08-14 DIAGNOSIS — J04.0 ACUTE LARYNGITIS: ICD-10-CM

## 2018-08-14 DIAGNOSIS — N17.9 ACUTE KIDNEY FAILURE, UNSPECIFIED: ICD-10-CM

## 2018-08-14 DIAGNOSIS — E78.00 PURE HYPERCHOLESTEROLEMIA, UNSPECIFIED: ICD-10-CM

## 2018-08-14 DIAGNOSIS — I82.4Z1 ACUTE EMBOLISM AND THROMBOSIS OF UNSPECIFIED DEEP VEINS OF RIGHT DISTAL LOWER EXTREMITY: ICD-10-CM

## 2018-08-14 DIAGNOSIS — G89.29 OTHER CHRONIC PAIN: ICD-10-CM

## 2018-08-14 DIAGNOSIS — Z88.1 ALLERGY STATUS TO OTHER ANTIBIOTIC AGENTS STATUS: ICD-10-CM

## 2018-08-14 DIAGNOSIS — K42.9 UMBILICAL HERNIA WITHOUT OBSTRUCTION OR GANGRENE: ICD-10-CM

## 2018-08-14 DIAGNOSIS — E03.9 HYPOTHYROIDISM, UNSPECIFIED: ICD-10-CM

## 2018-08-14 DIAGNOSIS — M54.9 DORSALGIA, UNSPECIFIED: ICD-10-CM

## 2018-08-14 DIAGNOSIS — Z91.09 OTHER ALLERGY STATUS, OTHER THAN TO DRUGS AND BIOLOGICAL SUBSTANCES: ICD-10-CM

## 2018-08-14 DIAGNOSIS — M48.00 SPINAL STENOSIS, SITE UNSPECIFIED: ICD-10-CM

## 2018-08-14 DIAGNOSIS — G47.33 OBSTRUCTIVE SLEEP APNEA (ADULT) (PEDIATRIC): ICD-10-CM

## 2018-08-14 DIAGNOSIS — Z88.0 ALLERGY STATUS TO PENICILLIN: ICD-10-CM

## 2018-08-14 DIAGNOSIS — Z79.84 LONG TERM (CURRENT) USE OF ORAL HYPOGLYCEMIC DRUGS: ICD-10-CM

## 2018-08-14 DIAGNOSIS — R06.02 SHORTNESS OF BREATH: ICD-10-CM

## 2018-08-14 DIAGNOSIS — J96.01 ACUTE RESPIRATORY FAILURE WITH HYPOXIA: ICD-10-CM

## 2018-08-20 ENCOUNTER — OUTPATIENT (OUTPATIENT)
Dept: OUTPATIENT SERVICES | Facility: HOSPITAL | Age: 78
LOS: 1 days | End: 2018-08-20
Payer: MEDICARE

## 2018-08-20 ENCOUNTER — APPOINTMENT (OUTPATIENT)
Dept: ULTRASOUND IMAGING | Facility: CLINIC | Age: 78
End: 2018-08-20
Payer: MEDICARE

## 2018-08-20 DIAGNOSIS — Z00.8 ENCOUNTER FOR OTHER GENERAL EXAMINATION: ICD-10-CM

## 2018-08-20 DIAGNOSIS — Z90.89 ACQUIRED ABSENCE OF OTHER ORGANS: Chronic | ICD-10-CM

## 2018-08-20 DIAGNOSIS — O00.90 UNSPECIFIED ECTOPIC PREGNANCY WITHOUT INTRAUTERINE PREGNANCY: Chronic | ICD-10-CM

## 2018-08-20 DIAGNOSIS — Z96.612 PRESENCE OF LEFT ARTIFICIAL SHOULDER JOINT: Chronic | ICD-10-CM

## 2018-08-20 DIAGNOSIS — Z90.49 ACQUIRED ABSENCE OF OTHER SPECIFIED PARTS OF DIGESTIVE TRACT: Chronic | ICD-10-CM

## 2018-08-20 DIAGNOSIS — Z46.6 ENCOUNTER FOR FITTING AND ADJUSTMENT OF URINARY DEVICE: Chronic | ICD-10-CM

## 2018-08-20 DIAGNOSIS — N61.1 ABSCESS OF THE BREAST AND NIPPLE: Chronic | ICD-10-CM

## 2018-08-20 DIAGNOSIS — N75.0 CYST OF BARTHOLIN'S GLAND: Chronic | ICD-10-CM

## 2018-08-20 PROCEDURE — 76775 US EXAM ABDO BACK WALL LIM: CPT

## 2018-08-20 PROCEDURE — 76775 US EXAM ABDO BACK WALL LIM: CPT | Mod: 26

## 2018-08-29 ENCOUNTER — APPOINTMENT (OUTPATIENT)
Dept: UROLOGY | Facility: CLINIC | Age: 78
End: 2018-08-29
Payer: MEDICARE

## 2018-08-29 VITALS
SYSTOLIC BLOOD PRESSURE: 141 MMHG | HEART RATE: 89 BPM | HEIGHT: 63 IN | DIASTOLIC BLOOD PRESSURE: 73 MMHG | TEMPERATURE: 98.2 F | OXYGEN SATURATION: 94 % | BODY MASS INDEX: 43.77 KG/M2 | WEIGHT: 247 LBS

## 2018-08-29 DIAGNOSIS — N20.0 CALCULUS OF KIDNEY: ICD-10-CM

## 2018-08-29 DIAGNOSIS — N28.1 CYST OF KIDNEY, ACQUIRED: ICD-10-CM

## 2018-08-29 DIAGNOSIS — N39.41 URGE INCONTINENCE: ICD-10-CM

## 2018-08-29 DIAGNOSIS — N32.81 OVERACTIVE BLADDER: ICD-10-CM

## 2018-08-29 LAB
BILIRUB UR QL STRIP: NEGATIVE
GLUCOSE UR-MCNC: NEGATIVE
HCG UR QL: 0.2 EU/DL
HGB UR QL STRIP.AUTO: NEGATIVE
KETONES UR-MCNC: NEGATIVE
LEUKOCYTE ESTERASE UR QL STRIP: NEGATIVE
NITRITE UR QL STRIP: NEGATIVE
PH UR STRIP: 6
PROT UR STRIP-MCNC: NEGATIVE
SP GR UR STRIP: 1.02

## 2018-08-29 PROCEDURE — 99214 OFFICE O/P EST MOD 30 MIN: CPT | Mod: 25

## 2018-08-29 PROCEDURE — 81003 URINALYSIS AUTO W/O SCOPE: CPT | Mod: QW

## 2018-08-29 PROCEDURE — 51798 US URINE CAPACITY MEASURE: CPT

## 2018-11-30 ENCOUNTER — APPOINTMENT (OUTPATIENT)
Dept: UROLOGY | Facility: CLINIC | Age: 78
End: 2018-11-30

## 2019-06-03 NOTE — PATIENT PROFILE ADULT. - ALCOHOL USE HISTORY SINGLE SELECT
The decrease in her kidney function is very slight and mild. I would not do anything different right now. I would, however, repeat a basic metabolic profile in 6 to 8 weeks. Diagnosis of hypertension. Make sure she is drinking enough fluid.   I would no yes...

## 2021-01-25 ENCOUNTER — APPOINTMENT (OUTPATIENT)
Dept: NEUROLOGY | Facility: CLINIC | Age: 81
End: 2021-01-25
Payer: MEDICARE

## 2021-01-25 VITALS
DIASTOLIC BLOOD PRESSURE: 88 MMHG | HEART RATE: 78 BPM | BODY MASS INDEX: 42.17 KG/M2 | HEIGHT: 63 IN | TEMPERATURE: 97.8 F | WEIGHT: 238 LBS | SYSTOLIC BLOOD PRESSURE: 128 MMHG

## 2021-01-25 DIAGNOSIS — Z82.49 FAMILY HISTORY OF ISCHEMIC HEART DISEASE AND OTHER DISEASES OF THE CIRCULATORY SYSTEM: ICD-10-CM

## 2021-01-25 DIAGNOSIS — M54.5 LOW BACK PAIN: ICD-10-CM

## 2021-01-25 DIAGNOSIS — E03.9 HYPOTHYROIDISM, UNSPECIFIED: ICD-10-CM

## 2021-01-25 DIAGNOSIS — M47.816 SPONDYLOSIS W/OUT MYELOPATHY OR RADICULOPATHY, LUMBAR REGION: ICD-10-CM

## 2021-01-25 DIAGNOSIS — E11.9 TYPE 2 DIABETES MELLITUS W/OUT COMPLICATIONS: ICD-10-CM

## 2021-01-25 DIAGNOSIS — Z87.891 PERSONAL HISTORY OF NICOTINE DEPENDENCE: ICD-10-CM

## 2021-01-25 DIAGNOSIS — E11.42 TYPE 2 DIABETES MELLITUS WITH DIABETIC POLYNEUROPATHY: ICD-10-CM

## 2021-01-25 DIAGNOSIS — M54.2 CERVICALGIA: ICD-10-CM

## 2021-01-25 DIAGNOSIS — M54.16 RADICULOPATHY, LUMBAR REGION: ICD-10-CM

## 2021-01-25 DIAGNOSIS — G89.29 CERVICALGIA: ICD-10-CM

## 2021-01-25 DIAGNOSIS — Z86.69 PERSONAL HISTORY OF OTHER DISEASES OF THE NERVOUS SYSTEM AND SENSE ORGANS: ICD-10-CM

## 2021-01-25 DIAGNOSIS — G62.9 POLYNEUROPATHY, UNSPECIFIED: ICD-10-CM

## 2021-01-25 DIAGNOSIS — Z86.79 PERSONAL HISTORY OF OTHER DISEASES OF THE CIRCULATORY SYSTEM: ICD-10-CM

## 2021-01-25 DIAGNOSIS — G89.29 LOW BACK PAIN: ICD-10-CM

## 2021-01-25 PROCEDURE — 99204 OFFICE O/P NEW MOD 45 MIN: CPT

## 2021-01-25 RX ORDER — ZINC SULFATE 50(220)MG
CAPSULE ORAL
Refills: 0 | Status: ACTIVE | COMMUNITY

## 2021-01-25 RX ORDER — ASPIRIN 81 MG
81 TABLET, DELAYED RELEASE (ENTERIC COATED) ORAL
Refills: 0 | Status: ACTIVE | COMMUNITY

## 2021-01-25 RX ORDER — ACETAMINOPHEN/DIPHENHYDRAMINE 500MG-25MG
1000 TABLET ORAL
Refills: 0 | Status: ACTIVE | COMMUNITY

## 2021-01-25 RX ORDER — SAXAGLIPTIN AND METFORMIN HYDROCHLORIDE 5; 1000 MG/1; MG/1
5-1000 TABLET, FILM COATED, EXTENDED RELEASE ORAL
Refills: 0 | Status: ACTIVE | COMMUNITY

## 2021-01-25 RX ORDER — IRBESARTAN 75 MG/1
75 TABLET ORAL
Refills: 0 | Status: ACTIVE | COMMUNITY

## 2021-01-25 RX ORDER — VIT C/E/ZN/COPPR/LUTEIN/ZEAXAN 250MG-90MG
CAPSULE ORAL
Refills: 0 | Status: ACTIVE | COMMUNITY

## 2021-01-25 RX ORDER — LEVOTHYROXINE SODIUM 0.09 MG/1
88 TABLET ORAL
Refills: 0 | Status: ACTIVE | COMMUNITY

## 2021-01-25 RX ORDER — METOPROLOL TARTRATE 25 MG/1
25 TABLET, FILM COATED ORAL
Refills: 0 | Status: ACTIVE | COMMUNITY

## 2021-01-25 RX ORDER — DEXTROMETHORPHAN POLISTIREX 30 MG/5 ML
SUSPENSION, EXTENDED RELEASE 12 HR ORAL
Refills: 0 | Status: ACTIVE | COMMUNITY

## 2021-01-25 NOTE — DISCUSSION/SUMMARY
[FreeTextEntry1] : 80-year-old right-handed female with PMHx of HTN, HLD, DM x several decades, lumbar DJD s/p lumbar laminectomy in 12/2020; presents with complaints of pain/feeling of swelling in the soles of both feet, difficulty walking due to discomfort in the feet and severe pain in the medial border right big toe\par \par Pt has been tried on neuropathic pain meds: Gabapentin, Lexapro, Lyrica and duloxetine, she reports she could not tolerate any of these medications due to the adverse effects.\par \par # Patient's symptoms and examination are suggestive of peripheral sensory neuropathy/small fiber neuropathy. In addition, the possibility of right L5 radiculopathy cannot be excluded.\par \par I had a detailed discussion with the patient, we discussed neuropathic pain and the treatment options, I reassured her that she has no swelling in her feet, her symptoms are most likely related to a sensory neuropathy.\par \par Discussed the option of trying another medication i.e. nortriptyline, patient does not want to try any other meds. I have recommended that she followup with pain management, she may benefit from some topical applications or other pain management modalities.

## 2021-01-25 NOTE — PHYSICAL EXAM
[General Appearance - In No Acute Distress] : in no acute distress [General Appearance - Alert] : alert [Oriented To Time, Place, And Person] : oriented to person, place, and time [Impaired Insight] : insight and judgment were intact [Affect] : the affect was normal [Person] : oriented to person [Time] : oriented to time [Place] : oriented to place [Registration Intact] : recent registration memory intact [Concentration Intact] : normal concentrating ability [Naming Objects] : no difficulty naming common objects [Repeating Phrases] : no difficulty repeating a phrase [Comprehension] : comprehension intact [Fluency] : fluency intact [Past History] : adequate knowledge of personal past history [Cranial Nerves Optic (II)] : visual acuity intact bilaterally,  visual fields full to confrontation, pupils equal round and reactive to light [Cranial Nerves Oculomotor (III)] : extraocular motion intact [Cranial Nerves Trigeminal (V)] : facial sensation intact symmetrically [Cranial Nerves Facial (VII)] : face symmetrical [Cranial Nerves Vestibulocochlear (VIII)] : hearing was intact bilaterally [Cranial Nerves Accessory (XI - Cranial And Spinal)] : head turning and shoulder shrug symmetric [Cranial Nerves Glossopharyngeal (IX)] : tongue and palate midline [Cranial Nerves Hypoglossal (XII)] : there was no tongue deviation with protrusion [Motor Tone] : muscle tone was normal in all four extremities [No Muscle Atrophy] : normal bulk in all four extremities [Motor Strength] : muscle strength was normal in all four extremities [Sensation Tactile Decrease] : light touch was intact [Proprioception] : proprioception was intact [Vibration Decrease Leg / Foot Both Ankles] : decreased at both ankles [Vibration Decrease Leg / Foot Toes Both Feet] : decreased at the toes of both feet  [Past-pointing] : there was no past-pointing [Tremor] : no tremor present [Coordination - Dysmetria Impaired Finger-to-Nose Bilateral] : not present [2+] : Patella right 2+ [0] : Ankle jerk right 0 [1+] : Ankle jerk left 1+ [Plantar Reflex Right Only] : normal on the right [Plantar Reflex Left Only] : normal on the left [FreeTextEntry7] : Hyperesthesia right big toe [FreeTextEntry8] : Mildly antalgic gait [PERRL With Normal Accommodation] : pupils were equal in size, round, reactive to light, with normal accommodation [Extraocular Movements] : extraocular movements were intact [Outer Ear] : the ears and nose were normal in appearance [Full Visual Field] : full visual field [Hearing Threshold Finger Rub Not [place]] : hearing was normal [] : the neck was supple [Neck Cervical Mass (___cm)] : no neck mass was observed [Auscultation Breath Sounds / Voice Sounds] : lungs were clear to auscultation bilaterally [Heart Sounds] : normal S1 and S2 [Arterial Pulses Carotid] : carotid pulses were normal with no bruits [Edema] : there was no peripheral edema [Involuntary Movements] : no involuntary movements were seen [FreeTextEntry1] : blue hue over toes bilaterally

## 2021-01-25 NOTE — HISTORY OF PRESENT ILLNESS
[FreeTextEntry1] : 80-year-old right-handed female with PMHx of HTN, HLD, DM x several decades, lumbar DJD s/p lumbar laminectomy in 12/2020; presents today with complaints of pain with swelling in the soles of both feet, difficulty walking due to discomfort in the feet and severe pain in the right big toe with accidental touch.\par \par Patient reports that she underwent lumbar laminectomy in February 2020, following the surgery she noted remarkable improvement of left more than right sciatica pain, she reports she had presumed the discomfort in her feet was secondary to lumbar issues, however following the surgery she has noted persistent symptoms in her feet. Patient reports she feels that her soles - bottom of feet are like inflated balloons, she feels as though she is walking on air or water, she has constant discomfort. In addition, she has a in the medial border of right big toe. Patient reports her daughter has tried starting her feet with no relief. She has been evaluated by neurologist Dr. Link, she had EMG/NCV studies of upper and lower extremities, she noted relief of paresthesias of the hands after she underwent carpal tunnel release surgery, for neuropathic pains she was tried on gabapentin, Lexapro, Lyrica and duloxetine, she reports she could not tolerate any of these medications due to the adverse effects.\par \par Patient continues to have low back pain, she reports due to Covid-19 restrictions she has not been able to follow up with her pain management or orthospine surgeon

## 2021-01-25 NOTE — REVIEW OF SYSTEMS
[Recent Weight Gain (___ Lbs)] : recent [unfilled] ~Ulb weight gain [Abnormal Sensation] : an abnormal sensation [Hypersensitivity] : hypersensitivity [Incontinence] : incontinence [As Noted in HPI] : as noted in HPI [Negative] : Heme/Lymph [de-identified] : LBP

## 2021-01-25 NOTE — REASON FOR VISIT
[Consultation] : a consultation visit [FreeTextEntry1] : Referred by Dr. Aguilar for evaluation of pain in right big toe, discomfort in the feet

## 2022-08-10 NOTE — PATIENT PROFILE ADULT. - FUNCTIONAL SCREEN CURRENT LEVEL: SWALLOWING (IF SCORE 2 OR MORE FOR ANY ITEM, CONSULT REHAB SERVICES), MLM)
Quality 130: Documentation Of Current Medications In The Medical Record: Current Medications Documented
Quality 431: Preventive Care And Screening: Unhealthy Alcohol Use - Screening: Patient not identified as an unhealthy alcohol user when screened for unhealthy alcohol use using a systematic screening method
Detail Level: Detailed
Quality 226: Preventive Care And Screening: Tobacco Use: Screening And Cessation Intervention: Patient screened for tobacco use and is an ex/non-smoker
passed S&S/(0) swallows foods/liquids without difficulty

## 2022-10-12 ENCOUNTER — APPOINTMENT (OUTPATIENT)
Dept: ORTHOPEDIC SURGERY | Facility: CLINIC | Age: 82
End: 2022-10-12

## 2022-10-13 ENCOUNTER — APPOINTMENT (OUTPATIENT)
Dept: ORTHOPEDIC SURGERY | Facility: CLINIC | Age: 82
End: 2022-10-13

## 2022-10-13 VITALS — WEIGHT: 234 LBS | BODY MASS INDEX: 44.75 KG/M2 | HEIGHT: 60.5 IN

## 2022-10-13 PROCEDURE — 99213 OFFICE O/P EST LOW 20 MIN: CPT

## 2022-10-13 RX ORDER — CLINDAMYCIN HYDROCHLORIDE 300 MG/1
300 CAPSULE ORAL
Qty: 30 | Refills: 0 | Status: ACTIVE | COMMUNITY
Start: 2022-10-13 | End: 1900-01-01

## 2022-10-13 NOTE — DATA REVIEWED
[Outside X-rays] : outside x-rays [Right] : of the right [Knee] : knee [FreeTextEntry1] : xray not ready

## 2022-10-13 NOTE — REASON FOR VISIT
[FreeTextEntry2] : Fell 1 week ago weds Oct 5th. She fell forward and went straight down. Knee is red, swollen, hot to the touch. Denies fever, chills, and shortness of breath. They have been using ice with no relief. Knee has been progresivly getting worse since sunday.Hx of RT knee replacement 09/2019.

## 2022-10-13 NOTE — PHYSICAL EXAM
[Left] : left knee [] : anterior pain with flexion [FreeTextEntry3] : eschar over anterior knee from fall. [FreeTextEntry8] : prepatellar redness around eschar. [TWNoteComboBox7] : flexion 90 degrees [de-identified] : extension 0 degrees

## 2022-10-13 NOTE — DISCUSSION/SUMMARY
[de-identified] : discussed need to continue using bacitricin,  need for abx d/t redness. discussed sx of infection\par continue wearing compression stockings\par rx for clindamycin\par f/u 1 week. advised to call if sx get worse.

## 2022-10-20 ENCOUNTER — APPOINTMENT (OUTPATIENT)
Dept: ORTHOPEDIC SURGERY | Facility: CLINIC | Age: 82
End: 2022-10-20

## 2022-10-20 VITALS — WEIGHT: 234 LBS | BODY MASS INDEX: 44.75 KG/M2 | HEIGHT: 60.5 IN

## 2022-10-20 DIAGNOSIS — Z96.652 PRESENCE OF LEFT ARTIFICIAL KNEE JOINT: ICD-10-CM

## 2022-10-20 DIAGNOSIS — S80.02XA CONTUSION OF LEFT KNEE, INITIAL ENCOUNTER: ICD-10-CM

## 2022-10-20 PROCEDURE — 99214 OFFICE O/P EST MOD 30 MIN: CPT

## 2022-10-20 NOTE — REASON FOR VISIT
[FreeTextEntry2] : patient here for wound eval.  was seen last week after fall 10/5/22.  states knee is feeling better and wound is healing. still taking abx/clindamycin

## 2022-10-20 NOTE — PHYSICAL EXAM
[Left] : left knee [4___] : hamstring 4[unfilled]/5 [] : no sign of infection [FreeTextEntry3] : Wound Anterior knee healing no eschar mild erythema    [FreeTextEntry8] : prepatellar effusion  no erythema  [TWNoteComboBox7] : flexion 90 degrees [de-identified] : extension 0 degrees

## 2022-10-20 NOTE — DISCUSSION/SUMMARY
[de-identified] : Continue ABX and  Monitor for S&S  Infection and call office right Away for eval.

## 2023-01-02 NOTE — DATA REVIEWED
[de-identified] : /17/21 random glucose 158, LFTs normal, CBC normal, UA 6.3, ferritin [facility]

## 2023-02-08 NOTE — ASU PATIENT PROFILE, ADULT - TRANSFUSION PREMEDICATION REQUIRED
Fely accompanied [facility] to the emergency department on 2/8/2023  Return date if applicable: 25/58/7251        If you have any questions or concerns, please don't hesitate to call        Gianluca Menchaca PA-C none

## 2023-10-18 NOTE — ED PROVIDER NOTE - CROS ED CARDIOVAS ALL NEG
Anesthesia Post Evaluation    Patient: Khadar Lorenzo    Procedure(s) Performed: Procedure(s) (LRB):  EXCISION, SUPERFICIAL MASS, HEAD (N/A)    Final Anesthesia Type: MAC      Patient location during evaluation: PACU  Patient participation: Yes- Able to Participate  Level of consciousness: awake and alert  Post-procedure vital signs: reviewed and stable  Pain management: adequate  Airway patency: patent    PONV status at discharge: No PONV  Anesthetic complications: no      Cardiovascular status: blood pressure returned to baseline  Respiratory status: unassisted  Hydration status: euvolemic  Follow-up not needed.          Vitals Value Taken Time   /64 10/18/23 1242   Temp 36.6 °C (97.8 °F) 10/18/23 1230   Pulse 76 10/18/23 1242   Resp 18 10/18/23 1242   SpO2 95 % 10/18/23 1242         Event Time   Out of Recovery 11:49:00         Pain/Tete Score: Pain Rating Prior to Med Admin: 7 (10/18/2023 12:11 PM)  Pain Rating Post Med Admin: 4 (10/18/2023 12:42 PM)  Tete Score: 10 (10/18/2023 12:20 PM)         - - -

## 2023-11-08 RX ORDER — CLINDAMYCIN HYDROCHLORIDE 300 MG/1
300 CAPSULE ORAL
Qty: 6 | Refills: 0 | Status: ACTIVE | COMMUNITY
Start: 2023-11-08 | End: 1900-01-01

## 2023-12-28 ENCOUNTER — EMERGENCY (EMERGENCY)
Facility: HOSPITAL | Age: 83
LOS: 0 days | Discharge: HOME | End: 2023-12-29
Attending: EMERGENCY MEDICINE
Payer: MEDICARE

## 2023-12-28 VITALS
OXYGEN SATURATION: 100 % | DIASTOLIC BLOOD PRESSURE: 61 MMHG | HEART RATE: 74 BPM | SYSTOLIC BLOOD PRESSURE: 146 MMHG | RESPIRATION RATE: 19 BRPM | TEMPERATURE: 101 F

## 2023-12-28 VITALS — HEIGHT: 60 IN | WEIGHT: 225.97 LBS

## 2023-12-28 DIAGNOSIS — O00.90 UNSPECIFIED ECTOPIC PREGNANCY WITHOUT INTRAUTERINE PREGNANCY: Chronic | ICD-10-CM

## 2023-12-28 DIAGNOSIS — M54.9 DORSALGIA, UNSPECIFIED: ICD-10-CM

## 2023-12-28 DIAGNOSIS — R53.83 OTHER FATIGUE: ICD-10-CM

## 2023-12-28 DIAGNOSIS — Z88.8 ALLERGY STATUS TO OTHER DRUGS, MEDICAMENTS AND BIOLOGICAL SUBSTANCES: ICD-10-CM

## 2023-12-28 DIAGNOSIS — Z88.1 ALLERGY STATUS TO OTHER ANTIBIOTIC AGENTS STATUS: ICD-10-CM

## 2023-12-28 DIAGNOSIS — M79.662 PAIN IN LEFT LOWER LEG: ICD-10-CM

## 2023-12-28 DIAGNOSIS — R09.81 NASAL CONGESTION: ICD-10-CM

## 2023-12-28 DIAGNOSIS — I45.10 UNSPECIFIED RIGHT BUNDLE-BRANCH BLOCK: ICD-10-CM

## 2023-12-28 DIAGNOSIS — M79.661 PAIN IN RIGHT LOWER LEG: ICD-10-CM

## 2023-12-28 DIAGNOSIS — R05.9 COUGH, UNSPECIFIED: ICD-10-CM

## 2023-12-28 DIAGNOSIS — Z86.39 PERSONAL HISTORY OF OTHER ENDOCRINE, NUTRITIONAL AND METABOLIC DISEASE: ICD-10-CM

## 2023-12-28 DIAGNOSIS — Z88.0 ALLERGY STATUS TO PENICILLIN: ICD-10-CM

## 2023-12-28 DIAGNOSIS — N75.0 CYST OF BARTHOLIN'S GLAND: Chronic | ICD-10-CM

## 2023-12-28 DIAGNOSIS — Z87.442 PERSONAL HISTORY OF URINARY CALCULI: ICD-10-CM

## 2023-12-28 DIAGNOSIS — Z96.612 PRESENCE OF LEFT ARTIFICIAL SHOULDER JOINT: Chronic | ICD-10-CM

## 2023-12-28 DIAGNOSIS — Z46.6 ENCOUNTER FOR FITTING AND ADJUSTMENT OF URINARY DEVICE: Chronic | ICD-10-CM

## 2023-12-28 DIAGNOSIS — E11.9 TYPE 2 DIABETES MELLITUS WITHOUT COMPLICATIONS: ICD-10-CM

## 2023-12-28 DIAGNOSIS — E78.5 HYPERLIPIDEMIA, UNSPECIFIED: ICD-10-CM

## 2023-12-28 DIAGNOSIS — N61.1 ABSCESS OF THE BREAST AND NIPPLE: Chronic | ICD-10-CM

## 2023-12-28 DIAGNOSIS — U07.1 COVID-19: ICD-10-CM

## 2023-12-28 DIAGNOSIS — G89.29 OTHER CHRONIC PAIN: ICD-10-CM

## 2023-12-28 DIAGNOSIS — Z90.49 ACQUIRED ABSENCE OF OTHER SPECIFIED PARTS OF DIGESTIVE TRACT: Chronic | ICD-10-CM

## 2023-12-28 DIAGNOSIS — I10 ESSENTIAL (PRIMARY) HYPERTENSION: ICD-10-CM

## 2023-12-28 DIAGNOSIS — Z90.89 ACQUIRED ABSENCE OF OTHER ORGANS: Chronic | ICD-10-CM

## 2023-12-28 DIAGNOSIS — E03.9 HYPOTHYROIDISM, UNSPECIFIED: ICD-10-CM

## 2023-12-28 LAB
ALBUMIN SERPL ELPH-MCNC: 3.5 G/DL — SIGNIFICANT CHANGE UP (ref 3.3–5)
ALBUMIN SERPL ELPH-MCNC: 3.5 G/DL — SIGNIFICANT CHANGE UP (ref 3.3–5)
ALP SERPL-CCNC: 151 U/L — HIGH (ref 40–120)
ALP SERPL-CCNC: 151 U/L — HIGH (ref 40–120)
ALT FLD-CCNC: 23 U/L — SIGNIFICANT CHANGE UP (ref 12–78)
ALT FLD-CCNC: 23 U/L — SIGNIFICANT CHANGE UP (ref 12–78)
ANION GAP SERPL CALC-SCNC: 6 MMOL/L — SIGNIFICANT CHANGE UP (ref 5–17)
ANION GAP SERPL CALC-SCNC: 6 MMOL/L — SIGNIFICANT CHANGE UP (ref 5–17)
AST SERPL-CCNC: 30 U/L — SIGNIFICANT CHANGE UP (ref 15–37)
AST SERPL-CCNC: 30 U/L — SIGNIFICANT CHANGE UP (ref 15–37)
BASOPHILS # BLD AUTO: 0.02 K/UL — SIGNIFICANT CHANGE UP (ref 0–0.2)
BASOPHILS # BLD AUTO: 0.02 K/UL — SIGNIFICANT CHANGE UP (ref 0–0.2)
BASOPHILS NFR BLD AUTO: 0.3 % — SIGNIFICANT CHANGE UP (ref 0–2)
BASOPHILS NFR BLD AUTO: 0.3 % — SIGNIFICANT CHANGE UP (ref 0–2)
BILIRUB SERPL-MCNC: 0.7 MG/DL — SIGNIFICANT CHANGE UP (ref 0.2–1.2)
BILIRUB SERPL-MCNC: 0.7 MG/DL — SIGNIFICANT CHANGE UP (ref 0.2–1.2)
BUN SERPL-MCNC: 18 MG/DL — SIGNIFICANT CHANGE UP (ref 7–23)
BUN SERPL-MCNC: 18 MG/DL — SIGNIFICANT CHANGE UP (ref 7–23)
CALCIUM SERPL-MCNC: 9.4 MG/DL — SIGNIFICANT CHANGE UP (ref 8.5–10.1)
CALCIUM SERPL-MCNC: 9.4 MG/DL — SIGNIFICANT CHANGE UP (ref 8.5–10.1)
CHLORIDE SERPL-SCNC: 106 MMOL/L — SIGNIFICANT CHANGE UP (ref 96–108)
CHLORIDE SERPL-SCNC: 106 MMOL/L — SIGNIFICANT CHANGE UP (ref 96–108)
CO2 SERPL-SCNC: 26 MMOL/L — SIGNIFICANT CHANGE UP (ref 22–31)
CO2 SERPL-SCNC: 26 MMOL/L — SIGNIFICANT CHANGE UP (ref 22–31)
CREAT SERPL-MCNC: 1.23 MG/DL — SIGNIFICANT CHANGE UP (ref 0.5–1.3)
CREAT SERPL-MCNC: 1.23 MG/DL — SIGNIFICANT CHANGE UP (ref 0.5–1.3)
EGFR: 43 ML/MIN/1.73M2 — LOW
EGFR: 43 ML/MIN/1.73M2 — LOW
EOSINOPHIL # BLD AUTO: 0.13 K/UL — SIGNIFICANT CHANGE UP (ref 0–0.5)
EOSINOPHIL # BLD AUTO: 0.13 K/UL — SIGNIFICANT CHANGE UP (ref 0–0.5)
EOSINOPHIL NFR BLD AUTO: 1.6 % — SIGNIFICANT CHANGE UP (ref 0–6)
EOSINOPHIL NFR BLD AUTO: 1.6 % — SIGNIFICANT CHANGE UP (ref 0–6)
GLUCOSE SERPL-MCNC: 157 MG/DL — HIGH (ref 70–99)
GLUCOSE SERPL-MCNC: 157 MG/DL — HIGH (ref 70–99)
HCT VFR BLD CALC: 44 % — SIGNIFICANT CHANGE UP (ref 34.5–45)
HCT VFR BLD CALC: 44 % — SIGNIFICANT CHANGE UP (ref 34.5–45)
HGB BLD-MCNC: 14.2 G/DL — SIGNIFICANT CHANGE UP (ref 11.5–15.5)
HGB BLD-MCNC: 14.2 G/DL — SIGNIFICANT CHANGE UP (ref 11.5–15.5)
IMM GRANULOCYTES NFR BLD AUTO: 0.6 % — SIGNIFICANT CHANGE UP (ref 0–0.9)
IMM GRANULOCYTES NFR BLD AUTO: 0.6 % — SIGNIFICANT CHANGE UP (ref 0–0.9)
LYMPHOCYTES # BLD AUTO: 0.87 K/UL — LOW (ref 1–3.3)
LYMPHOCYTES # BLD AUTO: 0.87 K/UL — LOW (ref 1–3.3)
LYMPHOCYTES # BLD AUTO: 11 % — LOW (ref 13–44)
LYMPHOCYTES # BLD AUTO: 11 % — LOW (ref 13–44)
MAGNESIUM SERPL-MCNC: 1.6 MG/DL — SIGNIFICANT CHANGE UP (ref 1.6–2.6)
MAGNESIUM SERPL-MCNC: 1.6 MG/DL — SIGNIFICANT CHANGE UP (ref 1.6–2.6)
MCHC RBC-ENTMCNC: 30.7 PG — SIGNIFICANT CHANGE UP (ref 27–34)
MCHC RBC-ENTMCNC: 30.7 PG — SIGNIFICANT CHANGE UP (ref 27–34)
MCHC RBC-ENTMCNC: 32.3 GM/DL — SIGNIFICANT CHANGE UP (ref 32–36)
MCHC RBC-ENTMCNC: 32.3 GM/DL — SIGNIFICANT CHANGE UP (ref 32–36)
MCV RBC AUTO: 95 FL — SIGNIFICANT CHANGE UP (ref 80–100)
MCV RBC AUTO: 95 FL — SIGNIFICANT CHANGE UP (ref 80–100)
MONOCYTES # BLD AUTO: 1.07 K/UL — HIGH (ref 0–0.9)
MONOCYTES # BLD AUTO: 1.07 K/UL — HIGH (ref 0–0.9)
MONOCYTES NFR BLD AUTO: 13.5 % — SIGNIFICANT CHANGE UP (ref 2–14)
MONOCYTES NFR BLD AUTO: 13.5 % — SIGNIFICANT CHANGE UP (ref 2–14)
NEUTROPHILS # BLD AUTO: 5.78 K/UL — SIGNIFICANT CHANGE UP (ref 1.8–7.4)
NEUTROPHILS # BLD AUTO: 5.78 K/UL — SIGNIFICANT CHANGE UP (ref 1.8–7.4)
NEUTROPHILS NFR BLD AUTO: 73 % — SIGNIFICANT CHANGE UP (ref 43–77)
NEUTROPHILS NFR BLD AUTO: 73 % — SIGNIFICANT CHANGE UP (ref 43–77)
PLATELET # BLD AUTO: 146 K/UL — LOW (ref 150–400)
PLATELET # BLD AUTO: 146 K/UL — LOW (ref 150–400)
POTASSIUM SERPL-MCNC: 4.5 MMOL/L — SIGNIFICANT CHANGE UP (ref 3.5–5.3)
POTASSIUM SERPL-MCNC: 4.5 MMOL/L — SIGNIFICANT CHANGE UP (ref 3.5–5.3)
POTASSIUM SERPL-SCNC: 4.5 MMOL/L — SIGNIFICANT CHANGE UP (ref 3.5–5.3)
POTASSIUM SERPL-SCNC: 4.5 MMOL/L — SIGNIFICANT CHANGE UP (ref 3.5–5.3)
PROT SERPL-MCNC: 7.5 GM/DL — SIGNIFICANT CHANGE UP (ref 6–8.3)
PROT SERPL-MCNC: 7.5 GM/DL — SIGNIFICANT CHANGE UP (ref 6–8.3)
RBC # BLD: 4.63 M/UL — SIGNIFICANT CHANGE UP (ref 3.8–5.2)
RBC # BLD: 4.63 M/UL — SIGNIFICANT CHANGE UP (ref 3.8–5.2)
RBC # FLD: 14.6 % — HIGH (ref 10.3–14.5)
RBC # FLD: 14.6 % — HIGH (ref 10.3–14.5)
SODIUM SERPL-SCNC: 138 MMOL/L — SIGNIFICANT CHANGE UP (ref 135–145)
SODIUM SERPL-SCNC: 138 MMOL/L — SIGNIFICANT CHANGE UP (ref 135–145)
TROPONIN I, HIGH SENSITIVITY RESULT: 5.22 NG/L — SIGNIFICANT CHANGE UP
TROPONIN I, HIGH SENSITIVITY RESULT: 5.22 NG/L — SIGNIFICANT CHANGE UP
WBC # BLD: 7.92 K/UL — SIGNIFICANT CHANGE UP (ref 3.8–10.5)
WBC # BLD: 7.92 K/UL — SIGNIFICANT CHANGE UP (ref 3.8–10.5)
WBC # FLD AUTO: 7.92 K/UL — SIGNIFICANT CHANGE UP (ref 3.8–10.5)
WBC # FLD AUTO: 7.92 K/UL — SIGNIFICANT CHANGE UP (ref 3.8–10.5)

## 2023-12-28 PROCEDURE — 93005 ELECTROCARDIOGRAM TRACING: CPT

## 2023-12-28 PROCEDURE — 71045 X-RAY EXAM CHEST 1 VIEW: CPT

## 2023-12-28 PROCEDURE — 83735 ASSAY OF MAGNESIUM: CPT

## 2023-12-28 PROCEDURE — 36415 COLL VENOUS BLD VENIPUNCTURE: CPT

## 2023-12-28 PROCEDURE — 84484 ASSAY OF TROPONIN QUANT: CPT

## 2023-12-28 PROCEDURE — 71045 X-RAY EXAM CHEST 1 VIEW: CPT | Mod: 26

## 2023-12-28 PROCEDURE — 80053 COMPREHEN METABOLIC PANEL: CPT

## 2023-12-28 PROCEDURE — 85025 COMPLETE CBC W/AUTO DIFF WBC: CPT

## 2023-12-28 PROCEDURE — 99285 EMERGENCY DEPT VISIT HI MDM: CPT | Mod: 25

## 2023-12-28 PROCEDURE — 94640 AIRWAY INHALATION TREATMENT: CPT

## 2023-12-28 PROCEDURE — 93010 ELECTROCARDIOGRAM REPORT: CPT

## 2023-12-28 PROCEDURE — 99285 EMERGENCY DEPT VISIT HI MDM: CPT

## 2023-12-28 RX ORDER — ALBUTEROL 90 UG/1
4 AEROSOL, METERED ORAL ONCE
Refills: 0 | Status: COMPLETED | OUTPATIENT
Start: 2023-12-28 | End: 2023-12-28

## 2023-12-28 RX ORDER — ACETAMINOPHEN 500 MG
1000 TABLET ORAL ONCE
Refills: 0 | Status: COMPLETED | OUTPATIENT
Start: 2023-12-28 | End: 2023-12-28

## 2023-12-28 RX ORDER — PSEUDOEPHEDRINE HCL 30 MG
30 TABLET ORAL ONCE
Refills: 0 | Status: COMPLETED | OUTPATIENT
Start: 2023-12-28 | End: 2023-12-28

## 2023-12-28 RX ORDER — ALBUTEROL 90 UG/1
2 AEROSOL, METERED ORAL
Qty: 1 | Refills: 0
Start: 2023-12-28

## 2023-12-28 RX ADMIN — Medication 30 MILLIGRAM(S): at 22:59

## 2023-12-28 RX ADMIN — Medication 200 MILLIGRAM(S): at 22:59

## 2023-12-28 NOTE — ED STATDOCS - NSICDXPASTSURGICALHX_GEN_ALL_CORE_FT
PAST SURGICAL HISTORY:  Abscess of breast drainage of abscess left breast    Bartholin cyst     Ectopic pregnancy     Encounter for removal of ureteral stent     History of tonsillectomy     S/P cholecystectomy     S/P shoulder replacement, left partial

## 2023-12-28 NOTE — ED ADULT NURSE NOTE - NSFALLHARMRISKINTERV_ED_ALL_ED
Communicate risk of Fall with Harm to all staff, patient, and family/Provide visual cue: red socks, yellow wristband, yellow gown, etc/Reinforce activity limits and safety measures with patient and family/Bed in lowest position, wheels locked, appropriate side rails in place/Call bell, personal items and telephone in reach/Instruct patient to call for assistance before getting out of bed/chair/stretcher/Non-slip footwear applied when patient is off stretcher/[place] to call system/Physically safe environment - no spills, clutter or unnecessary equipment/Purposeful Proactive Rounding/Room/bathroom lighting operational, light cord in reach Communicate risk of Fall with Harm to all staff, patient, and family/Provide visual cue: red socks, yellow wristband, yellow gown, etc/Reinforce activity limits and safety measures with patient and family/Bed in lowest position, wheels locked, appropriate side rails in place/Call bell, personal items and telephone in reach/Instruct patient to call for assistance before getting out of bed/chair/stretcher/Non-slip footwear applied when patient is off stretcher/[place] to call system/Physically safe environment - no spills, clutter or unnecessary equipment/Purposeful Proactive Rounding/Room/bathroom lighting operational, light cord in reach

## 2023-12-28 NOTE — ED PROVIDER NOTE - OBJECTIVE STATEMENT
84 y/o female with PMHx of DM, kidney stones, obesity, chronic back pain, hypothyroidism, HTN, HLD presents to ED with nasal congestion and cough for 3 days. Last night with low-grade fever. Today with fatigue and decreased PO intake. Took OTC sugar-free Robitussin without relief. Had telehealth evaluation with PA from Dr. Aguilar's office who told pt her options are limited with treatment due to interactions of medications. Was told if symptoms worsen to come to ED for further treatment. Pt states she feels more short of breath with cough. Tested positive for COVID at home yesterday.  Patient is in no apparent distress.  Patient states that she wants better medication for cough that she can't get over-the-counter.

## 2023-12-28 NOTE — ED ADULT TRIAGE NOTE - CHIEF COMPLAINT QUOTE
Patient presents to the ER with complaints of congestion, headache, low grade fever, cough, and sore throat. Patient tested positive for Covid 12/27

## 2023-12-28 NOTE — ED PROVIDER NOTE - PHYSICAL EXAMINATION
CONSTITUTIONAL: Well appearing, awake, alert, oriented to person, place, time/situation and in no apparent distress.  · ENMT: Airway patent, Nasal mucosa clear. Mouth with normal mucosa. Throat has no vesicles, no oropharyngeal exudates and uvula is midline.  · EYES: Clear bilaterally, pupils equal, round and reactive to light.  · CARDIAC: Normal rate, regular rhythm.  Heart sounds S1, S2.  No murmurs, rubs or gallops.  · RESPIRATORY: Breath sounds clear and equal bilaterally.  · MUSCULOSKELETAL: Spine appears normal, range of motion is not limited, bilateral calf TTP (pt notes she gets frequent spasms)  · NEUROLOGICAL: Alert and oriented, no focal deficits, no motor or sensory deficits.  · SKIN: Skin normal color for race, warm, dry and intact. No evidence of rash

## 2023-12-28 NOTE — ED PROVIDER NOTE - CLINICAL SUMMARY MEDICAL DECISION MAKING FREE TEXT BOX
82 y/o female with PMHx of DM, kidney stones, obesity, chronic back pain, hypothyroidism, HTN, HLD presents to ED with nasal congestion and cough for 3 days. Last night with low-grade fever. Today with fatigue and decreased PO intake. Took OTC sugar-free Robitussin without relief. Had telehealth evaluation with PA from Dr. Aguilar's office who told pt her options are limited with treatment due to interactions of medications. Was told if symptoms worsen to come to ED for further treatment. Pt states she feels more short of breath with cough. Tested positive for COVID at home yesterday.  Patient is in no apparent distress.  Patient states that she wants better medication for cough that she can't get over-the-counter.  EKG does not show any signs of ACS and is the same  as her previous EKG which shows of chronic right bundle branch block.  Will get basic labs, chest x-ray, administer antitussives and reassess.

## 2023-12-28 NOTE — ED ADULT TRIAGE NOTE - STATUS:
[FreeTextEntry1] : Baby's jaundice is resolved. Her TcB today is 8.6 and does not meet threshold for serum bili check.\par Advised to continue feeding adequately, supplement with Formula if breast milk is not enough\par Monitor for adequate urine output and stooling\par Can expose patient to indirect sunlight\par RTC or to ER if worsening jaundice, fever, AMS, lethargy, decreased feeding, decreased UOP, or SOB\par \par Weight - Baby's weight has dropped from 6Ib 2.4 oz five days ago to 5Ib 15.8 oz still below her birth weight. Her discharge weight was 6 Ib 7 oz.  Advised mother to shorten duration between feeds to every 2 hours around the clock to increase daily intake of formula to 24 oz. \par Return in 3 days for weight check. If weight loss continues, baby will be referred to hospital for further evaluation.\par All questions answered.\par \par   Applied

## 2023-12-28 NOTE — ED PROVIDER NOTE - NSDCPRINTRESULTS_ED_ALL_ED
c/o diffused abd pain onset 1 hour PTA ,pt reports  pain subsided, pt also endorses had anxiety and felt " like everything went numb" reports symptoms resolved, denies c/p SOB or palpitations, denies fever ro chills, reports last BM this morning, " normal", hx of fatty liver and DM.
Patient requests all Lab, Cardiology, and Radiology Results on their Discharge Instructions

## 2023-12-28 NOTE — ED PROVIDER NOTE - NSFOLLOWUPINSTRUCTIONS_ED_ALL_ED_FT
Drink plenty of fluids to stay hydrated.  Take Tylenol 650 mg every 4-6 hours as needed for fever and/or pain.  Return to the emergency department if you have worsening/persistent shortness of breath, chest pain, persistent vomiting, dizziness, weakness, slurred speech, leg swelling or for other concerns.

## 2023-12-28 NOTE — ED STATDOCS - PROGRESS NOTE DETAILS
Vero Fernandez for attending Dr. Duggan:   84 y/o female with PMHx of DM, kidney stones, obesity, chronic back pain, hypothyroidism, HTN, HLD presents to ED with nasal congestion and cough for 3 days. Last night with low-grade fever. Today with fatigue and decreased PO intake. Took OTC sugar-free Robitussin without relief. Had telehealth evaluation with PA from Dr. Aguilar's office who told pt her options are limited with treatment due to interactions of medications. Was told if symptoms worsen to come to ED for further treatment. Pt states she feels more short of breath with cough. Tested positive for COVID at home yesterday. Will send to main ED for further evaluation.

## 2023-12-28 NOTE — ED STATDOCS - NSICDXPASTMEDICALHX_GEN_ALL_CORE_FT
PAST MEDICAL HISTORY:  Arytenoid finding left arytenoid and vocal fold hematoma secondary to difficult intubation    Chronic back pain     DM (diabetes mellitus)     Hypercholesterolemia     Hypertension     Hypothyroidism     Hypovitaminosis D     Obesity     Poliomyelitis     Renal cyst     Spinal stenosis

## 2023-12-28 NOTE — ED STATDOCS - NSICDXFAMILYHX_GEN_ALL_CORE_FT
FAMILY HISTORY:  Father  Still living? Unknown  Family history of hypertension, Age at diagnosis: Age Unknown    Mother  Still living? Unknown  Family history of hypertension, Age at diagnosis: Age Unknown    Sibling  Still living? Unknown  Family history of diabetes mellitus, Age at diagnosis: Age Unknown    Grandparent  Still living? Unknown  Family history of diabetes mellitus, Age at diagnosis: Age Unknown

## 2023-12-28 NOTE — ED ADULT NURSE NOTE - OBJECTIVE STATEMENT
83y female presented to the ED with complaints of shortness of breath. Pt Covid +, complaining of congestion, headache, cough, low grade fever yesterday. Pt was seen by UC and told to come to ED for worsening symptoms. 98% SpO2 room air.

## 2023-12-28 NOTE — ED PROVIDER NOTE - PATIENT PORTAL LINK FT
You can access the FollowMyHealth Patient Portal offered by [facility] by registering at the following website: [email or web address]. By joining [employer]’s FollowMyHealth portal, you will also be able to view your health information using other applications (apps) compatible with our system. You can access the FollowMyHealth Patient Portal offered by [facility] by registering at the following website: [email or web address]. By joining [employer]’s FollowMyHealth portal, you will also be able to view your health information using other applications (apps) compatible with our system.

## 2023-12-29 RX ADMIN — ALBUTEROL 4 PUFF(S): 90 AEROSOL, METERED ORAL at 00:10

## 2023-12-29 RX ADMIN — Medication 1000 MILLIGRAM(S): at 00:10

## 2024-05-07 RX ORDER — AZITHROMYCIN 500 MG/1
500 TABLET, FILM COATED ORAL
Qty: 3 | Refills: 0 | Status: ACTIVE | COMMUNITY
Start: 2024-05-07 | End: 1900-01-01

## 2024-07-18 ENCOUNTER — APPOINTMENT (OUTPATIENT)
Dept: DERMATOLOGY | Facility: CLINIC | Age: 84
End: 2024-07-18

## 2024-08-27 ENCOUNTER — APPOINTMENT (OUTPATIENT)
Dept: DERMATOLOGY | Facility: CLINIC | Age: 84
End: 2024-08-27

## 2024-08-27 DIAGNOSIS — L82.1 OTHER SEBORRHEIC KERATOSIS: ICD-10-CM

## 2024-08-27 DIAGNOSIS — C44.619 BASAL CELL CARCINOMA OF SKIN OF LEFT UPPER LIMB, INCLUDING SHOULDER: ICD-10-CM

## 2024-08-27 DIAGNOSIS — Z80.8 FAMILY HISTORY OF MALIGNANT NEOPLASM OF OTHER ORGANS OR SYSTEMS: ICD-10-CM

## 2024-08-27 PROCEDURE — 99202 OFFICE O/P NEW SF 15 MIN: CPT | Mod: 25

## 2024-08-27 PROCEDURE — 17261 DSTRJ MAL LES T/A/L .6-1.0CM: CPT

## 2024-08-27 NOTE — HISTORY OF PRESENT ILLNESS
[FreeTextEntry1] : Evaluation of growths [de-identified] : First visit for 84-year-old white female presenting for evaluation of growths.    ?  History of prior skin cancer.  Patient also concerned about asymptomatic "blotches" on the chest.

## 2024-08-27 NOTE — PHYSICAL EXAM
[Alert] : alert [Oriented x 3] : ~L oriented x 3 [Well Nourished] : well nourished [FreeTextEntry3] : The following areas were examined and no significant abnormalities were seen except as noted below:  Type II skin  scalp, face, eyelids, nose, lips, ears, neck, chest, abdomen, back, buttocks, right arm, left arm, right hand, left hand, right  leg, left leg, right foot, left foot Breast and groin exams offered and declined by patient.  Left mid arm: 9 x 8 mm crusted pink plaque Chest: No rash or lesions seen Shoulders: Rare small brown verrucous papules Upper mid back: 9 x 8 mm slightly elevated smooth pink plaque-not clearly suspicious on dermoscopy  No other suspicious lesions seen

## 2024-08-27 NOTE — ASSESSMENT
[FreeTextEntry1] : Probable basal cell carcinoma on left mid arm ?  Incipient basal cell carcinoma on upper mid back

## 2024-08-27 NOTE — PLAN
[TextEntry] : Start Sarna original lotion as needed itching Will observe the lesion on the back-photo taken  Biopsy and eradicate lesion left mid arm  Patient informed there will be a scar.  Photo taken.  Verbal consent given.  1% lidocaine with epinephrine Shave biopsy Curettage and electrofulguration X 3 - final defect -1.3 cm Monsel's solution Vaseline  Wound care instructions given.  Patient to call if not notified of biopsy result(s) within 2 weeks.   [phone]  Return 2 months-recheck lesion on upper mid back  -  Patient's daughter present in exam room  Theresa, medical assistant, served as chaperone and was present for the entire skin exam.

## 2024-09-10 ENCOUNTER — NON-APPOINTMENT (OUTPATIENT)
Age: 84
End: 2024-09-10

## 2024-09-10 PROBLEM — Z85.828 HISTORY OF BASAL CELL CARCINOMA (BCC): Status: ACTIVE | Noted: 2024-09-10

## 2024-11-06 ENCOUNTER — APPOINTMENT (OUTPATIENT)
Dept: DERMATOLOGY | Facility: CLINIC | Age: 84
End: 2024-11-06
Payer: MEDICARE

## 2024-11-06 DIAGNOSIS — Z85.828 PERSONAL HISTORY OF OTHER MALIGNANT NEOPLASM OF SKIN: ICD-10-CM

## 2024-11-06 DIAGNOSIS — D48.5 NEOPLASM OF UNCERTAIN BEHAVIOR OF SKIN: ICD-10-CM

## 2024-11-06 DIAGNOSIS — C44.519 BASAL CELL CARCINOMA OF SKIN OF OTHER PART OF TRUNK: ICD-10-CM

## 2024-11-06 PROCEDURE — 17261 DSTRJ MAL LES T/A/L .6-1.0CM: CPT

## 2024-11-12 ENCOUNTER — NON-APPOINTMENT (OUTPATIENT)
Age: 84
End: 2024-11-12

## 2024-11-12 PROBLEM — C44.519 BASAL CELL CARCINOMA (BCC) OF BACK: Status: ACTIVE | Noted: 2024-11-12

## 2024-11-12 PROBLEM — D48.5 NEOPLASM OF UNCERTAIN BEHAVIOR OF SKIN: Status: ACTIVE | Noted: 2024-11-06

## 2024-11-14 ENCOUNTER — APPOINTMENT (OUTPATIENT)
Dept: ORTHOPEDIC SURGERY | Facility: CLINIC | Age: 84
End: 2024-11-14
Payer: MEDICARE

## 2024-11-14 VITALS — BODY MASS INDEX: 44.57 KG/M2 | WEIGHT: 227 LBS | HEIGHT: 60 IN

## 2024-11-14 DIAGNOSIS — S76.011A STRAIN OF MUSCLE, FASCIA AND TENDON OF RIGHT HIP, INITIAL ENCOUNTER: ICD-10-CM

## 2024-11-14 DIAGNOSIS — G89.29 PAIN IN RIGHT KNEE: ICD-10-CM

## 2024-11-14 DIAGNOSIS — M25.561 PAIN IN RIGHT KNEE: ICD-10-CM

## 2024-11-14 DIAGNOSIS — M17.11 UNILATERAL PRIMARY OSTEOARTHRITIS, RIGHT KNEE: ICD-10-CM

## 2024-11-14 PROCEDURE — 73564 X-RAY EXAM KNEE 4 OR MORE: CPT | Mod: RT

## 2024-11-14 PROCEDURE — 20610 DRAIN/INJ JOINT/BURSA W/O US: CPT | Mod: RT

## 2024-11-14 PROCEDURE — 99214 OFFICE O/P EST MOD 30 MIN: CPT | Mod: 25

## 2024-11-14 RX ORDER — MELOXICAM 15 MG/1
15 TABLET ORAL
Qty: 30 | Refills: 0 | Status: ACTIVE | COMMUNITY
Start: 2024-11-14 | End: 1900-01-01

## 2025-01-14 ENCOUNTER — APPOINTMENT (OUTPATIENT)
Dept: ORTHOPEDIC SURGERY | Facility: CLINIC | Age: 85
End: 2025-01-14

## 2025-05-06 ENCOUNTER — NON-APPOINTMENT (OUTPATIENT)
Age: 85
End: 2025-05-06

## 2025-05-06 ENCOUNTER — APPOINTMENT (OUTPATIENT)
Dept: DERMATOLOGY | Facility: CLINIC | Age: 85
End: 2025-05-06
Payer: MEDICARE

## 2025-05-06 DIAGNOSIS — L82.1 OTHER SEBORRHEIC KERATOSIS: ICD-10-CM

## 2025-05-06 DIAGNOSIS — L57.0 ACTINIC KERATOSIS: ICD-10-CM

## 2025-05-06 DIAGNOSIS — Z85.828 PERSONAL HISTORY OF OTHER MALIGNANT NEOPLASM OF SKIN: ICD-10-CM

## 2025-05-06 PROCEDURE — 17000 DESTRUCT PREMALG LESION: CPT

## 2025-05-06 PROCEDURE — 17003 DESTRUCT PREMALG LES 2-14: CPT

## 2025-05-06 PROCEDURE — 99212 OFFICE O/P EST SF 10 MIN: CPT | Mod: 25

## 2025-07-13 NOTE — ED ADULT NURSE NOTE - CAS TRG GEN SKIN CONDITION
At 0234 writer received phone call from [facility] Department who states they were unable to make contact with the pt to confirm IV removal.   Warm

## 2025-07-23 RX ORDER — AZITHROMYCIN 500 MG/1
500 TABLET, FILM COATED ORAL
Qty: 3 | Refills: 1 | Status: ACTIVE | COMMUNITY
Start: 2025-07-23 | End: 1900-01-01